# Patient Record
Sex: FEMALE | Race: WHITE | NOT HISPANIC OR LATINO | Employment: FULL TIME | URBAN - METROPOLITAN AREA
[De-identification: names, ages, dates, MRNs, and addresses within clinical notes are randomized per-mention and may not be internally consistent; named-entity substitution may affect disease eponyms.]

---

## 2021-03-30 ENCOUNTER — IMMUNIZATION (OUTPATIENT)
Dept: PRIMARY CARE CLINIC | Facility: CLINIC | Age: 26
End: 2021-03-30
Payer: COMMERCIAL

## 2021-03-30 DIAGNOSIS — Z23 NEED FOR VACCINATION: Primary | ICD-10-CM

## 2021-03-30 PROCEDURE — 0001A COVID-19, MRNA, LNP-S, PF, 30 MCG/0.3 ML DOSE VACCINE: ICD-10-PCS | Mod: CV19,S$GLB,, | Performed by: INTERNAL MEDICINE

## 2021-03-30 PROCEDURE — 91300 COVID-19, MRNA, LNP-S, PF, 30 MCG/0.3 ML DOSE VACCINE: CPT | Mod: S$GLB,,, | Performed by: INTERNAL MEDICINE

## 2021-03-30 PROCEDURE — 0001A COVID-19, MRNA, LNP-S, PF, 30 MCG/0.3 ML DOSE VACCINE: CPT | Mod: CV19,S$GLB,, | Performed by: INTERNAL MEDICINE

## 2021-03-30 PROCEDURE — 91300 COVID-19, MRNA, LNP-S, PF, 30 MCG/0.3 ML DOSE VACCINE: ICD-10-PCS | Mod: S$GLB,,, | Performed by: INTERNAL MEDICINE

## 2021-04-23 ENCOUNTER — IMMUNIZATION (OUTPATIENT)
Dept: PRIMARY CARE CLINIC | Facility: CLINIC | Age: 26
End: 2021-04-23
Payer: COMMERCIAL

## 2021-04-23 DIAGNOSIS — Z23 NEED FOR VACCINATION: Primary | ICD-10-CM

## 2021-04-23 PROCEDURE — 91300 COVID-19, MRNA, LNP-S, PF, 30 MCG/0.3 ML DOSE VACCINE: CPT | Mod: S$GLB,,, | Performed by: INTERNAL MEDICINE

## 2021-04-23 PROCEDURE — 0002A COVID-19, MRNA, LNP-S, PF, 30 MCG/0.3 ML DOSE VACCINE: ICD-10-PCS | Mod: CV19,S$GLB,, | Performed by: INTERNAL MEDICINE

## 2021-04-23 PROCEDURE — 0002A COVID-19, MRNA, LNP-S, PF, 30 MCG/0.3 ML DOSE VACCINE: CPT | Mod: CV19,S$GLB,, | Performed by: INTERNAL MEDICINE

## 2021-04-23 PROCEDURE — 91300 COVID-19, MRNA, LNP-S, PF, 30 MCG/0.3 ML DOSE VACCINE: ICD-10-PCS | Mod: S$GLB,,, | Performed by: INTERNAL MEDICINE

## 2021-06-13 PROBLEM — F41.9 ANXIETY: Status: ACTIVE | Noted: 2021-06-13

## 2021-06-14 ENCOUNTER — OFFICE VISIT (OUTPATIENT)
Dept: PRIMARY CARE CLINIC | Facility: CLINIC | Age: 26
End: 2021-06-14
Payer: COMMERCIAL

## 2021-06-14 VITALS
BODY MASS INDEX: 34.64 KG/M2 | RESPIRATION RATE: 18 BRPM | SYSTOLIC BLOOD PRESSURE: 115 MMHG | WEIGHT: 188.25 LBS | HEIGHT: 62 IN | TEMPERATURE: 98 F | DIASTOLIC BLOOD PRESSURE: 67 MMHG | OXYGEN SATURATION: 97 % | HEART RATE: 73 BPM

## 2021-06-14 DIAGNOSIS — Z00.00 NORMAL PHYSICAL EXAM, ROUTINE: Primary | ICD-10-CM

## 2021-06-14 DIAGNOSIS — Z13.220 SCREENING FOR LIPOID DISORDERS: ICD-10-CM

## 2021-06-14 DIAGNOSIS — F41.9 ANXIETY: ICD-10-CM

## 2021-06-14 PROBLEM — F90.0 ATTENTION DEFICIT HYPERACTIVITY DISORDER (ADHD), PREDOMINANTLY INATTENTIVE TYPE: Status: ACTIVE | Noted: 2021-06-14

## 2021-06-14 PROCEDURE — 99999 PR PBB SHADOW E&M-EST. PATIENT-LVL IV: CPT | Mod: PBBFAC,,, | Performed by: INTERNAL MEDICINE

## 2021-06-14 PROCEDURE — 3008F PR BODY MASS INDEX (BMI) DOCUMENTED: ICD-10-PCS | Mod: CPTII,S$GLB,, | Performed by: INTERNAL MEDICINE

## 2021-06-14 PROCEDURE — 3008F BODY MASS INDEX DOCD: CPT | Mod: CPTII,S$GLB,, | Performed by: INTERNAL MEDICINE

## 2021-06-14 PROCEDURE — 99385 PR PREVENTIVE VISIT,NEW,18-39: ICD-10-PCS | Mod: S$GLB,,, | Performed by: INTERNAL MEDICINE

## 2021-06-14 PROCEDURE — 99385 PREV VISIT NEW AGE 18-39: CPT | Mod: S$GLB,,, | Performed by: INTERNAL MEDICINE

## 2021-06-14 PROCEDURE — 99999 PR PBB SHADOW E&M-EST. PATIENT-LVL IV: ICD-10-PCS | Mod: PBBFAC,,, | Performed by: INTERNAL MEDICINE

## 2021-06-14 PROCEDURE — 1126F AMNT PAIN NOTED NONE PRSNT: CPT | Mod: S$GLB,,, | Performed by: INTERNAL MEDICINE

## 2021-06-14 PROCEDURE — 1126F PR PAIN SEVERITY QUANTIFIED, NO PAIN PRESENT: ICD-10-PCS | Mod: S$GLB,,, | Performed by: INTERNAL MEDICINE

## 2021-06-14 RX ORDER — PAROXETINE HYDROCHLORIDE 40 MG/1
TABLET, FILM COATED ORAL
Qty: 90 TABLET | Refills: 3 | Status: SHIPPED | OUTPATIENT
Start: 2021-06-14 | End: 2022-05-02

## 2021-06-14 RX ORDER — PAROXETINE HYDROCHLORIDE 40 MG/1
TABLET, FILM COATED ORAL
COMMUNITY
End: 2021-06-14 | Stop reason: SDUPTHER

## 2021-06-14 RX ORDER — PAROXETINE HYDROCHLORIDE 40 MG/1
TABLET, FILM COATED ORAL
Qty: 90 TABLET | Refills: 2 | Status: SHIPPED | OUTPATIENT
Start: 2021-06-14 | End: 2021-06-14

## 2021-10-05 ENCOUNTER — TELEPHONE (OUTPATIENT)
Dept: PRIMARY CARE CLINIC | Facility: CLINIC | Age: 26
End: 2021-10-05

## 2021-10-05 ENCOUNTER — PATIENT MESSAGE (OUTPATIENT)
Dept: ADMINISTRATIVE | Facility: HOSPITAL | Age: 26
End: 2021-10-05

## 2021-10-05 DIAGNOSIS — F41.9 ANXIETY: Primary | ICD-10-CM

## 2022-01-18 ENCOUNTER — PATIENT MESSAGE (OUTPATIENT)
Dept: ADMINISTRATIVE | Facility: HOSPITAL | Age: 27
End: 2022-01-18
Payer: COMMERCIAL

## 2022-02-07 ENCOUNTER — LAB VISIT (OUTPATIENT)
Dept: LAB | Facility: HOSPITAL | Age: 27
End: 2022-02-07
Attending: INTERNAL MEDICINE
Payer: COMMERCIAL

## 2022-02-07 ENCOUNTER — OFFICE VISIT (OUTPATIENT)
Dept: PRIMARY CARE CLINIC | Facility: CLINIC | Age: 27
End: 2022-02-07
Payer: COMMERCIAL

## 2022-02-07 VITALS
OXYGEN SATURATION: 98 % | TEMPERATURE: 98 F | WEIGHT: 186.06 LBS | HEIGHT: 62 IN | DIASTOLIC BLOOD PRESSURE: 84 MMHG | SYSTOLIC BLOOD PRESSURE: 132 MMHG | HEART RATE: 83 BPM | BODY MASS INDEX: 34.24 KG/M2

## 2022-02-07 DIAGNOSIS — R22.1: Primary | ICD-10-CM

## 2022-02-07 DIAGNOSIS — R22.1: ICD-10-CM

## 2022-02-07 PROCEDURE — 1160F RVW MEDS BY RX/DR IN RCRD: CPT | Mod: CPTII,S$GLB,, | Performed by: INTERNAL MEDICINE

## 2022-02-07 PROCEDURE — 99999 PR PBB SHADOW E&M-EST. PATIENT-LVL IV: CPT | Mod: PBBFAC,,, | Performed by: INTERNAL MEDICINE

## 2022-02-07 PROCEDURE — 3079F PR MOST RECENT DIASTOLIC BLOOD PRESSURE 80-89 MM HG: ICD-10-PCS | Mod: CPTII,S$GLB,, | Performed by: INTERNAL MEDICINE

## 2022-02-07 PROCEDURE — 99999 PR PBB SHADOW E&M-EST. PATIENT-LVL IV: ICD-10-PCS | Mod: PBBFAC,,, | Performed by: INTERNAL MEDICINE

## 2022-02-07 PROCEDURE — 1159F MED LIST DOCD IN RCRD: CPT | Mod: CPTII,S$GLB,, | Performed by: INTERNAL MEDICINE

## 2022-02-07 PROCEDURE — 1160F PR REVIEW ALL MEDS BY PRESCRIBER/CLIN PHARMACIST DOCUMENTED: ICD-10-PCS | Mod: CPTII,S$GLB,, | Performed by: INTERNAL MEDICINE

## 2022-02-07 PROCEDURE — 36415 COLL VENOUS BLD VENIPUNCTURE: CPT | Mod: PN | Performed by: INTERNAL MEDICINE

## 2022-02-07 PROCEDURE — 99213 PR OFFICE/OUTPT VISIT, EST, LEVL III, 20-29 MIN: ICD-10-PCS | Mod: S$GLB,,, | Performed by: INTERNAL MEDICINE

## 2022-02-07 PROCEDURE — 1159F PR MEDICATION LIST DOCUMENTED IN MEDICAL RECORD: ICD-10-PCS | Mod: CPTII,S$GLB,, | Performed by: INTERNAL MEDICINE

## 2022-02-07 PROCEDURE — 99213 OFFICE O/P EST LOW 20 MIN: CPT | Mod: S$GLB,,, | Performed by: INTERNAL MEDICINE

## 2022-02-07 PROCEDURE — 3079F DIAST BP 80-89 MM HG: CPT | Mod: CPTII,S$GLB,, | Performed by: INTERNAL MEDICINE

## 2022-02-07 PROCEDURE — 85025 COMPLETE CBC W/AUTO DIFF WBC: CPT | Performed by: INTERNAL MEDICINE

## 2022-02-07 PROCEDURE — 80048 BASIC METABOLIC PNL TOTAL CA: CPT | Performed by: INTERNAL MEDICINE

## 2022-02-07 PROCEDURE — 3008F PR BODY MASS INDEX (BMI) DOCUMENTED: ICD-10-PCS | Mod: CPTII,S$GLB,, | Performed by: INTERNAL MEDICINE

## 2022-02-07 PROCEDURE — 3075F SYST BP GE 130 - 139MM HG: CPT | Mod: CPTII,S$GLB,, | Performed by: INTERNAL MEDICINE

## 2022-02-07 PROCEDURE — 3008F BODY MASS INDEX DOCD: CPT | Mod: CPTII,S$GLB,, | Performed by: INTERNAL MEDICINE

## 2022-02-07 PROCEDURE — 3075F PR MOST RECENT SYSTOLIC BLOOD PRESS GE 130-139MM HG: ICD-10-PCS | Mod: CPTII,S$GLB,, | Performed by: INTERNAL MEDICINE

## 2022-02-07 NOTE — PROGRESS NOTES
"Subjective:       Patient ID: Yoana Thomas is a 26 y.o. female.    Chief Complaint: Lump (Left neck//ear region, doubled in size since [place])    Patient of Dr. Leyva presents for an urgent visit c/o lump in neck. She first noticed it months ago as a small nodular swelling at the angle of the mandible on left. History of keloid removed from left earlobe, with radiation treatment locally years ago. No recent trauma. The lump is growing, "doubled in size" over past six weeks. It is not painful, but is mildly tender on palpation. No associated upper respiratory symptoms or oral lesions, but she does feel her hearing is decreased in left ear. No ear pain, pruritis or discharge, no skin lesions in the area. Has not noticed any lumps or swollen glands elsewhere on her body.     PMH: Anxiety, ADD. Flu shot 2020. Pfizer COVID vaccine 3/21, 4/21, no booster.   PSH: [place] teeth, Keloid left earlobe.   NKDA.  Non-smoker, occasional alcohol. Works in [employer].    FMH: HTN, DM, Heart dis, Kidney dis, Skin cancer, Lung cancer.     Review of Systems   Constitutional: Negative for chills, diaphoresis, fatigue, fever and unexpected weight change.   HENT: Negative for nasal congestion, dental problem, ear discharge, ear pain, facial swelling, mouth sores, postnasal drip, rhinorrhea, sore throat, tinnitus, trouble swallowing and voice change.    Respiratory: Negative for cough, shortness of breath and stridor.    Gastrointestinal: Negative for abdominal pain, nausea and vomiting.   Neurological: Negative for dizziness and headaches.         Objective:    /84, Pulse 83, Temp 98.3, O2 Sat 98%, Wt 186 lbs (from 188 eight months ago)  Physical Exam  Constitutional:       General: She is not in acute distress.     Appearance: She is not ill-appearing.   HENT:      Head: Normocephalic and atraumatic.      Left Ear: Tympanic membrane, ear canal and external ear normal. There is no " impacted cerumen.      Nose: Nose normal. No congestion.      Mouth/Throat:      Pharynx: Oropharynx is clear.   Eyes:      Extraocular Movements: Extraocular movements intact.      Conjunctiva/sclera: Conjunctivae normal.   Neck:      Thyroid: No thyroid mass or thyromegaly.      Trachea: Trachea normal.        Comments: Visible and palpable single nodular density at angle of the mandible on left 1-1.5cm size, round, firm, mobile, non-tender, no abnormality of overlying skin. No adenopathy elsewhere in the neck.    Cardiovascular:      Rate and Rhythm: Normal rate and regular rhythm.      Heart sounds: No murmur heard.      Pulmonary:      Effort: Pulmonary effort is normal. No respiratory distress.      Breath sounds: Normal breath sounds. No wheezing, rhonchi or rales.   Musculoskeletal:      Cervical back: Normal range of motion and neck supple. No rigidity.   Skin:     General: Skin is warm and dry.      Findings: No bruising, erythema, lesion or rash.   Neurological:      Mental Status: She is alert.      Cranial Nerves: No cranial nerve deficit.         Assessment:       Problem List Items Addressed This Visit    None     Visit Diagnoses     Solitary mass of neck without fever    -  Primary    Relevant Orders    CBC Auto Differential    Basic Metabolic Panel    Ambulatory referral/consult to ENT          Plan:       Solitary mass of neck without fever - chronic, increasing size, in low risk, young non-smoker.  -     CBC Auto Differential; Future; Expected date: 02/07/2022  -     Basic Metabolic Panel; Future; Expected date: 02/07/2022  -     Ambulatory referral/consult to ENT; Future; Expected date: 02/14/2022                 -  Imaging deferred to ENT.

## 2022-02-08 LAB
ANION GAP SERPL CALC-SCNC: 11 MMOL/L (ref 8–16)
BASOPHILS # BLD AUTO: 0.03 K/UL (ref 0–0.2)
BASOPHILS NFR BLD: 0.3 % (ref 0–1.9)
BUN SERPL-MCNC: 11 MG/DL (ref 6–20)
CALCIUM SERPL-MCNC: 9.7 MG/DL (ref 8.7–10.5)
CHLORIDE SERPL-SCNC: 100 MMOL/L (ref 95–110)
CO2 SERPL-SCNC: 25 MMOL/L (ref 23–29)
CREAT SERPL-MCNC: 0.7 MG/DL (ref 0.5–1.4)
DIFFERENTIAL METHOD: ABNORMAL
EOSINOPHIL # BLD AUTO: 0.1 K/UL (ref 0–0.5)
EOSINOPHIL NFR BLD: 1 % (ref 0–8)
ERYTHROCYTE [DISTWIDTH] IN BLOOD BY AUTOMATED COUNT: 12.3 % (ref 11.5–14.5)
EST. GFR  (AFRICAN AMERICAN): >60 ML/MIN/1.73 M^2
EST. GFR  (NON AFRICAN AMERICAN): >60 ML/MIN/1.73 M^2
GLUCOSE SERPL-MCNC: 107 MG/DL (ref 70–110)
HCT VFR BLD AUTO: 43.6 % (ref 37–48.5)
HGB BLD-MCNC: 14.2 G/DL (ref 12–16)
IMM GRANULOCYTES # BLD AUTO: 0.05 K/UL (ref 0–0.04)
IMM GRANULOCYTES NFR BLD AUTO: 0.6 % (ref 0–0.5)
LYMPHOCYTES # BLD AUTO: 2.5 K/UL (ref 1–4.8)
LYMPHOCYTES NFR BLD: 28.6 % (ref 18–48)
MCH RBC QN AUTO: 29 PG (ref 27–31)
MCHC RBC AUTO-ENTMCNC: 32.6 G/DL (ref 32–36)
MCV RBC AUTO: 89 FL (ref 82–98)
MONOCYTES # BLD AUTO: 0.8 K/UL (ref 0.3–1)
MONOCYTES NFR BLD: 9.2 % (ref 4–15)
NEUTROPHILS # BLD AUTO: 5.3 K/UL (ref 1.8–7.7)
NEUTROPHILS NFR BLD: 60.3 % (ref 38–73)
NRBC BLD-RTO: 0 /100 WBC
PLATELET # BLD AUTO: 306 K/UL (ref 150–450)
PMV BLD AUTO: 10.5 FL (ref 9.2–12.9)
POTASSIUM SERPL-SCNC: 3.9 MMOL/L (ref 3.5–5.1)
RBC # BLD AUTO: 4.9 M/UL (ref 4–5.4)
SODIUM SERPL-SCNC: 136 MMOL/L (ref 136–145)
WBC # BLD AUTO: 8.87 K/UL (ref 3.9–12.7)

## 2022-02-16 ENCOUNTER — OFFICE VISIT (OUTPATIENT)
Dept: OTOLARYNGOLOGY | Facility: CLINIC | Age: 27
End: 2022-02-16
Payer: COMMERCIAL

## 2022-02-16 VITALS
HEART RATE: 116 BPM | WEIGHT: 185 LBS | DIASTOLIC BLOOD PRESSURE: 81 MMHG | BODY MASS INDEX: 33.84 KG/M2 | SYSTOLIC BLOOD PRESSURE: 123 MMHG

## 2022-02-16 DIAGNOSIS — K11.8 PAROTID MASS: Primary | ICD-10-CM

## 2022-02-16 PROCEDURE — 88313 SPECIAL STAINS GROUP 2: CPT | Mod: 26,,, | Performed by: PATHOLOGY

## 2022-02-16 PROCEDURE — 3074F PR MOST RECENT SYSTOLIC BLOOD PRESSURE < 130 MM HG: ICD-10-PCS | Mod: CPTII,S$GLB,, | Performed by: OTOLARYNGOLOGY

## 2022-02-16 PROCEDURE — 99204 OFFICE O/P NEW MOD 45 MIN: CPT | Mod: S$GLB,,, | Performed by: OTOLARYNGOLOGY

## 2022-02-16 PROCEDURE — 88313 SPECIAL STAINS GROUP 2: CPT | Performed by: PATHOLOGY

## 2022-02-16 PROCEDURE — 1159F MED LIST DOCD IN RCRD: CPT | Mod: CPTII,S$GLB,, | Performed by: OTOLARYNGOLOGY

## 2022-02-16 PROCEDURE — 88172 PR  EVALUATION OF FNA SMEAR TO DETERMINE ADEQUACY, FIRST EVAL: ICD-10-PCS | Mod: 26,,, | Performed by: PATHOLOGY

## 2022-02-16 PROCEDURE — 88271 CYTOGENETICS DNA PROBE: CPT | Mod: 59 | Performed by: PATHOLOGY

## 2022-02-16 PROCEDURE — 10021 FNA BX W/O IMG GDN 1ST LES: CPT | Mod: ,,, | Performed by: PATHOLOGY

## 2022-02-16 PROCEDURE — 88173 CYTOPATH EVAL FNA REPORT: CPT | Mod: 26,,, | Performed by: PATHOLOGY

## 2022-02-16 PROCEDURE — 88305 TISSUE EXAM BY PATHOLOGIST: CPT | Performed by: PATHOLOGY

## 2022-02-16 PROCEDURE — 88173 CYTOPATH EVAL FNA REPORT: CPT | Performed by: PATHOLOGY

## 2022-02-16 PROCEDURE — 3008F PR BODY MASS INDEX (BMI) DOCUMENTED: ICD-10-PCS | Mod: CPTII,S$GLB,, | Performed by: OTOLARYNGOLOGY

## 2022-02-16 PROCEDURE — 3008F BODY MASS INDEX DOCD: CPT | Mod: CPTII,S$GLB,, | Performed by: OTOLARYNGOLOGY

## 2022-02-16 PROCEDURE — 88305 TISSUE EXAM BY PATHOLOGIST: ICD-10-PCS | Mod: 26,,, | Performed by: PATHOLOGY

## 2022-02-16 PROCEDURE — 3079F PR MOST RECENT DIASTOLIC BLOOD PRESSURE 80-89 MM HG: ICD-10-PCS | Mod: CPTII,S$GLB,, | Performed by: OTOLARYNGOLOGY

## 2022-02-16 PROCEDURE — 88313 PR  SPECIAL STAINS,GROUP II: ICD-10-PCS | Mod: 26,,, | Performed by: PATHOLOGY

## 2022-02-16 PROCEDURE — 3079F DIAST BP 80-89 MM HG: CPT | Mod: CPTII,S$GLB,, | Performed by: OTOLARYNGOLOGY

## 2022-02-16 PROCEDURE — 88305 TISSUE EXAM BY PATHOLOGIST: CPT | Mod: 26,,, | Performed by: PATHOLOGY

## 2022-02-16 PROCEDURE — 88173 PR  INTERPRETATION OF FNA SMEAR: ICD-10-PCS | Mod: 26,,, | Performed by: PATHOLOGY

## 2022-02-16 PROCEDURE — 88172 CYTP DX EVAL FNA 1ST EA SITE: CPT | Mod: 26,,, | Performed by: PATHOLOGY

## 2022-02-16 PROCEDURE — 1159F PR MEDICATION LIST DOCUMENTED IN MEDICAL RECORD: ICD-10-PCS | Mod: CPTII,S$GLB,, | Performed by: OTOLARYNGOLOGY

## 2022-02-16 PROCEDURE — 99999 PR PBB SHADOW E&M-EST. PATIENT-LVL III: ICD-10-PCS | Mod: PBBFAC,,, | Performed by: OTOLARYNGOLOGY

## 2022-02-16 PROCEDURE — 99999 PR PBB SHADOW E&M-EST. PATIENT-LVL III: CPT | Mod: PBBFAC,,, | Performed by: OTOLARYNGOLOGY

## 2022-02-16 PROCEDURE — 3074F SYST BP LT 130 MM HG: CPT | Mod: CPTII,S$GLB,, | Performed by: OTOLARYNGOLOGY

## 2022-02-16 PROCEDURE — 10021 PR FINE NEEDLE ASP BIOPSY, W/O IMAGING GUIDANCE, 1ST LESION: ICD-10-PCS | Mod: ,,, | Performed by: PATHOLOGY

## 2022-02-16 PROCEDURE — 99204 PR OFFICE/OUTPT VISIT, NEW, LEVL IV, 45-59 MIN: ICD-10-PCS | Mod: S$GLB,,, | Performed by: OTOLARYNGOLOGY

## 2022-02-16 NOTE — PROCEDURES
Fine Needle Aspiration (FNA) Procedure Note    Yoana Thomas is a 26 y.o. female patient.    Pulse: (!) 116 (02/16/22 1552)  BP: 123/81 (02/16/22 1552)  Weight: 83.9 kg (185 lb) (02/16/22 1552)    The patient was examined and there was a palpable mass on the left parotid (tail).     The nature of the procedure, including indication, limitations, and risks were explained to the patient, and a consent form was signed. A timeout was performed at 1640.    The skin was prepared with alcohol, and a fine needle aspirate was performed. Four (4) passes were performed. Material was obtained, and the results will follow in a separate report. The patient tolerated the procedure well.    Additional comments: None  Complications: None    Yoana Thomas  02/16/2022      Procedures    2/16/2022

## 2022-02-23 NOTE — PROGRESS NOTES
Chief Complaint   Patient presents with    solitary mass         26 y.o. female presents with left infraauricular mass. She first noticed it months ago as a small nodular swelling. History significant for keloid removal on left earlobe treated with post-excision XRT in 2016. No recent trauma. The mass has increased in size over past six weeks. It is not painful, but is mildly tender on palpation. No ear pain, pruritis or discharge, no skin lesions in the area. Has not noticed any lumps or swollen glands elsewhere on her body.       Review of Systems     Constitutional: Negative for fatigue and unexpected weight change.   HENT: per HPI.  Eyes: Negative for visual disturbance.   Respiratory: Negative for shortness of breath, hemoptysis  Cardiovascular: Negative for chest pain and palpitations.   Genitourinary: Negative for dysuria and difficulty urinating.   Musculoskeletal: Negative for decreased ROM, back pain.   Skin: Negative for rash, sunburn, itching.   Neurological: Negative for dizziness and seizures.   Hematological: Negative for adenopathy. Does not bruise/bleed easily.   Psychiatric/Behavioral: Negative for agitation. The patient is not nervous/anxious.   Endocrine: Negative for rapid weight loss/weight gain, heat/cold intolerance.     Past Medical History:   Diagnosis Date    Anxiety     Attention deficit hyperactivity disorder (ADHD), predominantly inattentive type 6/14/2021       Past Surgical History:   Procedure Laterality Date    KELOID EXCISION      Left ear and she also had radiation Tx    WISDOM TOOTH EXTRACTION         family history includes Diabetes type I in her maternal grandmother; Diabetes type II in her maternal grandfather; Heart disease in her maternal grandmother; Hypertension in her father; Infertility in her sister; Kidney failure in her maternal grandmother; Lung cancer in her paternal grandfather; Skin cancer in her father.    Pt  reports that she has never smoked. She has never  used smokeless tobacco. She reports current alcohol use. She reports that she does not use drugs.    Review of patient's allergies indicates:  No Known Allergies     Physical Exam    Vitals:    02/16/22 1552   BP: 123/81   Pulse: (!) 116     Body mass index is 33.84 kg/m².    Physical Exam  Vitals and nursing note reviewed.   Constitutional:       General: She is not in acute distress.     Appearance: She is well-developed. She is not diaphoretic.   HENT:      Head: Normocephalic and atraumatic.        Right Ear: Hearing, tympanic membrane, ear canal and external ear normal. No decreased hearing noted. No middle ear effusion. Tympanic membrane has normal mobility.      Left Ear: Hearing, tympanic membrane, ear canal and external ear normal. No decreased hearing noted.  No middle ear effusion. Tympanic membrane has normal mobility.      Nose: Nose normal.   Eyes:      General: Lids are normal.         Right eye: No discharge.         Left eye: No discharge.      Conjunctiva/sclera: Conjunctivae normal.      Pupils: Pupils are equal, round, and reactive to light.   Neck:      Thyroid: No thyroid mass or thyromegaly.      Trachea: Trachea and phonation normal. No tracheal tenderness or tracheal deviation.   Cardiovascular:      Heart sounds: Normal heart sounds.   Pulmonary:      Breath sounds: Normal breath sounds. No stridor.   Abdominal:      Palpations: Abdomen is soft.   Musculoskeletal:      Cervical back: Normal range of motion and neck supple. No edema or erythema.   Lymphadenopathy:      Cervical: No cervical adenopathy.   Skin:     General: Skin is warm and dry.      Coloration: Skin is not pale.      Findings: No erythema or rash.   Neurological:      Mental Status: She is alert and oriented to person, place, and time.           Assessment     1. Parotid mass          Plan  In summary, Ms. Thomas is a 26 year old female with left tail of parotid mass, recently increased in size with history of adjacent  XRT. Discussed possible parotid neoplasm vs cyst vs lymph node. FNA done in clinic today. CT neck ordered for further evaluation. I will contact her when path/imaging complete with results and recommendations. All questions were answered and she is in agreement with the plan.

## 2022-03-04 ENCOUNTER — HOSPITAL ENCOUNTER (OUTPATIENT)
Dept: RADIOLOGY | Facility: HOSPITAL | Age: 27
Discharge: HOME | End: 2022-03-04
Attending: OTOLARYNGOLOGY
Payer: COMMERCIAL

## 2022-03-04 DIAGNOSIS — K11.8 PAROTID MASS: ICD-10-CM

## 2022-03-04 PROCEDURE — 25500020 PHARM REV CODE 255: Performed by: OTOLARYNGOLOGY

## 2022-03-04 PROCEDURE — 70491 CT SOFT TISSUE NECK WITH CONTRAST: ICD-10-PCS | Mod: 26,,, | Performed by: RADIOLOGY

## 2022-03-04 PROCEDURE — 70491 CT SOFT TISSUE NECK W/DYE: CPT | Mod: 26,,, | Performed by: RADIOLOGY

## 2022-03-04 PROCEDURE — 70491 CT SOFT TISSUE NECK W/DYE: CPT | Mod: TC

## 2022-03-04 RX ADMIN — IOHEXOL 75 ML: 350 INJECTION, SOLUTION INTRAVENOUS at 05:03

## 2022-03-05 ENCOUNTER — PATIENT MESSAGE (OUTPATIENT)
Dept: OTOLARYNGOLOGY | Facility: CLINIC | Age: 27
End: 2022-03-05
Payer: COMMERCIAL

## 2022-03-08 ENCOUNTER — OFFICE VISIT (OUTPATIENT)
Dept: OTOLARYNGOLOGY | Facility: CLINIC | Age: 27
End: 2022-03-08
Payer: COMMERCIAL

## 2022-03-08 VITALS
SYSTOLIC BLOOD PRESSURE: 124 MMHG | HEART RATE: 84 BPM | TEMPERATURE: 98 F | BODY MASS INDEX: 34.44 KG/M2 | WEIGHT: 188.25 LBS | DIASTOLIC BLOOD PRESSURE: 82 MMHG

## 2022-03-08 DIAGNOSIS — K11.8 PAROTID MASS: Primary | ICD-10-CM

## 2022-03-08 PROCEDURE — 3079F DIAST BP 80-89 MM HG: CPT | Mod: CPTII,S$GLB,, | Performed by: OTOLARYNGOLOGY

## 2022-03-08 PROCEDURE — 3008F PR BODY MASS INDEX (BMI) DOCUMENTED: ICD-10-PCS | Mod: CPTII,S$GLB,, | Performed by: OTOLARYNGOLOGY

## 2022-03-08 PROCEDURE — 99999 PR PBB SHADOW E&M-EST. PATIENT-LVL III: CPT | Mod: PBBFAC,,, | Performed by: OTOLARYNGOLOGY

## 2022-03-08 PROCEDURE — 3074F PR MOST RECENT SYSTOLIC BLOOD PRESSURE < 130 MM HG: ICD-10-PCS | Mod: CPTII,S$GLB,, | Performed by: OTOLARYNGOLOGY

## 2022-03-08 PROCEDURE — 99999 PR PBB SHADOW E&M-EST. PATIENT-LVL III: ICD-10-PCS | Mod: PBBFAC,,, | Performed by: OTOLARYNGOLOGY

## 2022-03-08 PROCEDURE — 99214 PR OFFICE/OUTPT VISIT, EST, LEVL IV, 30-39 MIN: ICD-10-PCS | Mod: S$GLB,,, | Performed by: OTOLARYNGOLOGY

## 2022-03-08 PROCEDURE — 1159F PR MEDICATION LIST DOCUMENTED IN MEDICAL RECORD: ICD-10-PCS | Mod: CPTII,S$GLB,, | Performed by: OTOLARYNGOLOGY

## 2022-03-08 PROCEDURE — 1159F MED LIST DOCD IN RCRD: CPT | Mod: CPTII,S$GLB,, | Performed by: OTOLARYNGOLOGY

## 2022-03-08 PROCEDURE — 99214 OFFICE O/P EST MOD 30 MIN: CPT | Mod: S$GLB,,, | Performed by: OTOLARYNGOLOGY

## 2022-03-08 PROCEDURE — 3008F BODY MASS INDEX DOCD: CPT | Mod: CPTII,S$GLB,, | Performed by: OTOLARYNGOLOGY

## 2022-03-08 PROCEDURE — 3074F SYST BP LT 130 MM HG: CPT | Mod: CPTII,S$GLB,, | Performed by: OTOLARYNGOLOGY

## 2022-03-08 PROCEDURE — 3079F PR MOST RECENT DIASTOLIC BLOOD PRESSURE 80-89 MM HG: ICD-10-PCS | Mod: CPTII,S$GLB,, | Performed by: OTOLARYNGOLOGY

## 2022-03-08 NOTE — PROGRESS NOTES
Chief Complaint   Patient presents with    discuss surgery         26 y.o. female presents with left infraauricular mass. She first noticed it months ago as a small nodular swelling. History significant for keloid removal on left earlobe treated with post-excision XRT in 2016. No recent trauma. The mass has increased in size over past six weeks. It is not painful, but is mildly tender on palpation. No ear pain, pruritis or discharge, no skin lesions in the area. Has not noticed any lumps or swollen glands elsewhere on her body.     Here for follow up after CT and FNA. No changes since last visit.      Review of Systems     Constitutional: Negative for fatigue and unexpected weight change.   HENT: per HPI.  Eyes: Negative for visual disturbance.   Respiratory: Negative for shortness of breath, hemoptysis  Cardiovascular: Negative for chest pain and palpitations.   Genitourinary: Negative for dysuria and difficulty urinating.   Musculoskeletal: Negative for decreased ROM, back pain.   Skin: Negative for rash, sunburn, itching.   Neurological: Negative for dizziness and seizures.   Hematological: Negative for adenopathy. Does not bruise/bleed easily.   Psychiatric/Behavioral: Negative for agitation. The patient is not nervous/anxious.   Endocrine: Negative for rapid weight loss/weight gain, heat/cold intolerance.     Past Medical History:   Diagnosis Date    Anxiety     Attention deficit hyperactivity disorder (ADHD), predominantly inattentive type 6/14/2021       Past Surgical History:   Procedure Laterality Date    KELOID EXCISION      Left ear and she also had radiation Tx    WISDOM TOOTH EXTRACTION         family history includes Diabetes type I in her maternal grandmother; Diabetes type II in her maternal grandfather; Heart disease in her maternal grandmother; Hypertension in her father; Infertility in her sister; Kidney failure in her maternal grandmother; Lung cancer in her paternal grandfather; Skin cancer  in her father.    Pt  reports that she has never smoked. She has never used smokeless tobacco. She reports current alcohol use. She reports that she does not use drugs.    Review of patient's allergies indicates:  No Known Allergies     Physical Exam    Vitals:    03/08/22 0922   BP: 124/82   Pulse: 84   Temp: 98.2 °F (36.8 °C)     Body mass index is 34.44 kg/m².    Physical Exam  Vitals and nursing note reviewed.   Constitutional:       General: She is not in acute distress.     Appearance: She is well-developed. She is not diaphoretic.   HENT:      Head: Normocephalic and atraumatic.        Right Ear: Hearing, tympanic membrane, ear canal and external ear normal. No decreased hearing noted. No middle ear effusion. Tympanic membrane has normal mobility.      Left Ear: Hearing, tympanic membrane, ear canal and external ear normal. No decreased hearing noted.  No middle ear effusion. Tympanic membrane has normal mobility.      Nose: Nose normal.   Eyes:      General: Lids are normal.         Right eye: No discharge.         Left eye: No discharge.      Conjunctiva/sclera: Conjunctivae normal.      Pupils: Pupils are equal, round, and reactive to light.   Neck:      Thyroid: No thyroid mass or thyromegaly.      Trachea: Trachea and phonation normal. No tracheal tenderness or tracheal deviation.   Cardiovascular:      Heart sounds: Normal heart sounds.   Pulmonary:      Breath sounds: Normal breath sounds. No stridor.   Abdominal:      Palpations: Abdomen is soft.   Musculoskeletal:      Cervical back: Normal range of motion and neck supple. No edema or erythema.   Lymphadenopathy:      Cervical: No cervical adenopathy.   Skin:     General: Skin is warm and dry.      Coloration: Skin is not pale.      Findings: No erythema or rash.   Neurological:      Mental Status: She is alert and oriented to person, place, and time.       Studies Reviewed:    CT Neck 3/4/22:  Impression:     Nonspecific irregular mixed cystic and  "solid  superficial left parotid mass.  The lesion enhances prominently and may be somewhat vascular in nature.  Adjacent prominent intraparotid lymph nodes suggested.  Borderline in size nonspecific left upper jugular chain lymph node with smaller clustered adjacent nodes.    Pathology:    SUBMITTED AS "LEFT TAIL OF PAROTID MASS", FINE NEEDLE ASPIRATION (PATHOLOGIST   PERFORMED):   - Atypical cells present:   - Rare groups of epithelial cells with focal intracellular mucin in a   background of mucinous cyst contents     Overall, the findings in this case are suspicious for a low-grade mucinous   cystic lesion, such as low-grade mucoepidermoid carcinoma or Warthin tumor   with mucinous metaplasia. The scant cellularity of the specimen makes it   difficult to entirely exclude a non-neoplastic process, such as acquired   mucinous cyst or sialadenitis/sialolithiasis with mucinous metaplasia of   ducts.    Assessment     1. Parotid mass          Plan  In summary, Ms. Thomas is a 26 year old female with left tail of parotid mass, recently increased in size with history of adjacent XRT. 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 Recommend parotidectomy for tumor removal and definitive diagnosis. We then discussed the risks, benefits, indications, and alternatives to left parotidectomy. The risks were noted to include, but not be limited to, infection, bleeding, scarring, partial or total weakness of one or all of the branches of the facial nerve, eye dryness and potential injury with nerve weakness, numbness of the ear and the side of the face, gustatory sweating (Anna's syndrome - which was described as sweating while eating due to cholinergic stimulation of sweat glands in the absence of salivary tissue), first bite syndrome (pain upon initial bite of food after dissection in the parapharyngeal space, tumor spillage, recurrence, collection of blood or tissue fluid, sialolcele formation requiring drainage, " and the need for additional procedures. The benefits in this case will be diagnostic and potentially therapeutic, and the indication is a parotid mass. The chief alternative, in the absence of a diagnosis, is observation or repeat needle biopsy. Time was allowed for questions, and all questions were answered to the patient's apparent satisfaction.  Informed consent was obtained. Plan for OR 3/21/22.

## 2022-03-14 ENCOUNTER — TELEPHONE (OUTPATIENT)
Dept: PREADMISSION TESTING | Facility: HOSPITAL | Age: 27
End: 2022-03-14
Payer: COMMERCIAL

## 2022-03-14 DIAGNOSIS — Z01.818 PRE-OP TESTING: Primary | ICD-10-CM

## 2022-03-14 NOTE — ANESTHESIA PAT ROS NOTE
"                                                                                                             03/14/2022  Yoana Thomas is a 26 y.o., female.      Pre-op Assessment          Review of Systems  Anesthesia Hx:  Denies Hx of Anesthetic complications  History of prior surgery of interest to airway management or planning: facial plastic/reconstructive. Previous anesthesia: General NASAL FRACTURE REPAIR "IN [employer]" with general anesthesia.  Denies Family Hx of Anesthesia complications.   Denies Personal Hx of Anesthesia complications.   Social:  No Alcohol Use, Non-Smoker    Hematology/Oncology:  Hematology Normal       -- Cancer in past history:  radiation    EENT/Dental:   PAROTID MASS.   Cardiovascular:    Denies Angina.  Functional Capacity good / => 4 METS    Pulmonary:   Denies Shortness of breath.  Denies Recent URI.    Renal/:  Renal/ Normal     Hepatic/GI:  Hepatic/GI Normal    Musculoskeletal:  Musculoskeletal Normal    Neurological:  Neurology Normal    Endocrine:  Obesity / BMI > 30  Psych:   Psychiatric History anxiety             Anesthesia Assessment: Preoperative EQUATION    Planned Procedure: Procedure(s) (LRB):  EXCISION, PAROTID GLAND-nerve monitoring (Left)  CREATION, FLAP, MUSCLE ROTATION-SCM (Left)  Requested Anesthesia Type:General  Surgeon: Edel Hall MD  Service: ENT  Known or anticipated Date of Surgery:3/21/2022    Surgeon notes: reviewed    Electronic QUestionnaire Assessment completed via nurse interview with patient.        Triage considerations:     The patient has no apparent active cardiac condition (No unstable coronary Syndrome such as severe unstable angina or recent [<1 month] myocardial infarction, decompensated CHF, severe valvular   disease or significant arrhythmia)    Previous anesthesia records:Not available    Last PCP note: within 3 months , within [facility]   Subspecialty notes: ENT    Other important co-morbidities: Obesity and HX " OF RADIATION ~2015      Tests already available:  Available tests,  within 3 months , within [facility] .     2/16/22  CT NECK    2/7/22  BMP  CBC              Instructions given. (See in Nurse's note)    Optimization:  Anesthesia Preop Clinic Assessment NOT Indicated    Medical Opinion NOT Indicated           Plan:    Testing:  EKG and T&S     Patient  has previously scheduled Medical Appointment: NOT AT THIS TIME    Navigation: Tests Scheduled.                           Results will be tracked by Preop Clinic.

## 2022-03-16 ENCOUNTER — HOSPITAL ENCOUNTER (OUTPATIENT)
Dept: CARDIOLOGY | Facility: CLINIC | Age: 27
Discharge: HOME | End: 2022-03-16
Payer: COMMERCIAL

## 2022-03-16 ENCOUNTER — LAB VISIT (OUTPATIENT)
Dept: LAB | Facility: HOSPITAL | Age: 27
End: 2022-03-16
Attending: ANESTHESIOLOGY
Payer: COMMERCIAL

## 2022-03-16 DIAGNOSIS — Z01.818 PRE-OP TESTING: ICD-10-CM

## 2022-03-16 LAB
ABO + RH BLD: NORMAL
ADEQUACY: ABNORMAL
BLD GP AB SCN CELLS X3 SERPL QL: NORMAL
COMMENT: ABNORMAL
FINAL PATHOLOGIC DIAGNOSIS: ABNORMAL
Lab: ABNORMAL
SUPPLEMENTAL DIAGNOSIS: ABNORMAL

## 2022-03-16 PROCEDURE — 36415 COLL VENOUS BLD VENIPUNCTURE: CPT | Performed by: ANESTHESIOLOGY

## 2022-03-16 PROCEDURE — 93005 ELECTROCARDIOGRAM TRACING: CPT | Mod: S$GLB,,, | Performed by: ANESTHESIOLOGY

## 2022-03-16 PROCEDURE — 93005 EKG 12-LEAD: ICD-10-PCS | Mod: S$GLB,,, | Performed by: ANESTHESIOLOGY

## 2022-03-16 PROCEDURE — 86901 BLOOD TYPING SEROLOGIC RH(D): CPT | Performed by: ANESTHESIOLOGY

## 2022-03-16 PROCEDURE — 93010 ELECTROCARDIOGRAM REPORT: CPT | Mod: S$GLB,,, | Performed by: INTERNAL MEDICINE

## 2022-03-16 PROCEDURE — 93010 EKG 12-LEAD: ICD-10-PCS | Mod: S$GLB,,, | Performed by: INTERNAL MEDICINE

## 2022-03-21 ENCOUNTER — HOSPITAL ENCOUNTER (OUTPATIENT)
Facility: HOSPITAL | Age: 27
Discharge: HOME | End: 2022-03-22
Attending: OTOLARYNGOLOGY | Admitting: OTOLARYNGOLOGY
Payer: COMMERCIAL

## 2022-03-21 ENCOUNTER — ANESTHESIA (OUTPATIENT)
Dept: SURGERY | Facility: HOSPITAL | Age: 27
End: 2022-03-21
Payer: COMMERCIAL

## 2022-03-21 ENCOUNTER — ANESTHESIA EVENT (OUTPATIENT)
Dept: SURGERY | Facility: HOSPITAL | Age: 27
End: 2022-03-21
Payer: COMMERCIAL

## 2022-03-21 DIAGNOSIS — D49.0 PAROTID NEOPLASM: Primary | ICD-10-CM

## 2022-03-21 LAB
B-HCG UR QL: NEGATIVE
CTP QC/QA: YES

## 2022-03-21 PROCEDURE — 15733 PR MUSCLE/MYOCUTANEOUS/FASIOTEANEOUS FLAP, HEAD/NECK: ICD-10-PCS | Mod: 51,82,, | Performed by: OTOLARYNGOLOGY

## 2022-03-21 PROCEDURE — D9220A PRA ANESTHESIA: ICD-10-PCS | Mod: ANES,,, | Performed by: ANESTHESIOLOGY

## 2022-03-21 PROCEDURE — 25000003 PHARM REV CODE 250: Performed by: NURSE ANESTHETIST, CERTIFIED REGISTERED

## 2022-03-21 PROCEDURE — D9220A PRA ANESTHESIA: Mod: ANES,,, | Performed by: ANESTHESIOLOGY

## 2022-03-21 PROCEDURE — 71000016 HC POSTOP RECOV ADDL HR: Performed by: OTOLARYNGOLOGY

## 2022-03-21 PROCEDURE — 27201423 OPTIME MED/SURG SUP & DEVICES STERILE SUPPLY: Performed by: OTOLARYNGOLOGY

## 2022-03-21 PROCEDURE — 36000708 HC OR TIME LEV III 1ST 15 MIN: Performed by: OTOLARYNGOLOGY

## 2022-03-21 PROCEDURE — 88341 IMHCHEM/IMCYTCHM EA ADD ANTB: CPT | Mod: 59 | Performed by: STUDENT IN AN ORGANIZED HEALTH CARE EDUCATION/TRAINING PROGRAM

## 2022-03-21 PROCEDURE — 88342 IMHCHEM/IMCYTCHM 1ST ANTB: CPT | Mod: 26,,, | Performed by: STUDENT IN AN ORGANIZED HEALTH CARE EDUCATION/TRAINING PROGRAM

## 2022-03-21 PROCEDURE — 71000033 HC RECOVERY, INTIAL HOUR: Performed by: OTOLARYNGOLOGY

## 2022-03-21 PROCEDURE — 63600175 PHARM REV CODE 636 W HCPCS: Performed by: OTOLARYNGOLOGY

## 2022-03-21 PROCEDURE — 63600175 PHARM REV CODE 636 W HCPCS: Performed by: STUDENT IN AN ORGANIZED HEALTH CARE EDUCATION/TRAINING PROGRAM

## 2022-03-21 PROCEDURE — 88307 TISSUE EXAM BY PATHOLOGIST: CPT | Performed by: STUDENT IN AN ORGANIZED HEALTH CARE EDUCATION/TRAINING PROGRAM

## 2022-03-21 PROCEDURE — 42415 EXCISE PAROTID GLAND/LESION: CPT | Mod: LT,,, | Performed by: OTOLARYNGOLOGY

## 2022-03-21 PROCEDURE — 71000015 HC POSTOP RECOV 1ST HR: Performed by: OTOLARYNGOLOGY

## 2022-03-21 PROCEDURE — 42415 PR EXC PAROTD,LAT LOBE,DISSECT 5TH NERV: ICD-10-PCS | Mod: 82,LT,, | Performed by: OTOLARYNGOLOGY

## 2022-03-21 PROCEDURE — 27000221 HC OXYGEN, UP TO 24 HOURS

## 2022-03-21 PROCEDURE — D9220A PRA ANESTHESIA: Mod: CRNA,,, | Performed by: STUDENT IN AN ORGANIZED HEALTH CARE EDUCATION/TRAINING PROGRAM

## 2022-03-21 PROCEDURE — 88307 PR  SURG PATH,LEVEL V: ICD-10-PCS | Mod: 26,,, | Performed by: STUDENT IN AN ORGANIZED HEALTH CARE EDUCATION/TRAINING PROGRAM

## 2022-03-21 PROCEDURE — 88307 TISSUE EXAM BY PATHOLOGIST: CPT | Mod: 26,,, | Performed by: STUDENT IN AN ORGANIZED HEALTH CARE EDUCATION/TRAINING PROGRAM

## 2022-03-21 PROCEDURE — 36000709 HC OR TIME LEV III EA ADD 15 MIN: Performed by: OTOLARYNGOLOGY

## 2022-03-21 PROCEDURE — 88341 PR IHC OR ICC EACH ADD'L SINGLE ANTIBODY  STAINPR: ICD-10-PCS | Mod: 26,,, | Performed by: STUDENT IN AN ORGANIZED HEALTH CARE EDUCATION/TRAINING PROGRAM

## 2022-03-21 PROCEDURE — 15733 MUSC MYOQ/FSCQ FLP H&N PEDCL: CPT | Mod: 51,82,, | Performed by: OTOLARYNGOLOGY

## 2022-03-21 PROCEDURE — 25000003 PHARM REV CODE 250: Performed by: OTOLARYNGOLOGY

## 2022-03-21 PROCEDURE — 25000003 PHARM REV CODE 250: Performed by: STUDENT IN AN ORGANIZED HEALTH CARE EDUCATION/TRAINING PROGRAM

## 2022-03-21 PROCEDURE — 15733 MUSC MYOQ/FSCQ FLP H&N PEDCL: CPT | Mod: 51,,, | Performed by: OTOLARYNGOLOGY

## 2022-03-21 PROCEDURE — 15733 PR MUSCLE/MYOCUTANEOUS/FASIOTEANEOUS FLAP, HEAD/NECK: ICD-10-PCS | Mod: 51,,, | Performed by: OTOLARYNGOLOGY

## 2022-03-21 PROCEDURE — 81025 URINE PREGNANCY TEST: CPT | Performed by: OTOLARYNGOLOGY

## 2022-03-21 PROCEDURE — D9220A PRA ANESTHESIA: ICD-10-PCS | Mod: CRNA,,, | Performed by: STUDENT IN AN ORGANIZED HEALTH CARE EDUCATION/TRAINING PROGRAM

## 2022-03-21 PROCEDURE — 88342 CHG IMMUNOCYTOCHEMISTRY: ICD-10-PCS | Mod: 26,,, | Performed by: STUDENT IN AN ORGANIZED HEALTH CARE EDUCATION/TRAINING PROGRAM

## 2022-03-21 PROCEDURE — 42415 EXCISE PAROTID GLAND/LESION: CPT | Mod: 82,LT,, | Performed by: OTOLARYNGOLOGY

## 2022-03-21 PROCEDURE — 88342 IMHCHEM/IMCYTCHM 1ST ANTB: CPT | Performed by: STUDENT IN AN ORGANIZED HEALTH CARE EDUCATION/TRAINING PROGRAM

## 2022-03-21 PROCEDURE — 88341 IMHCHEM/IMCYTCHM EA ADD ANTB: CPT | Mod: 26,,, | Performed by: STUDENT IN AN ORGANIZED HEALTH CARE EDUCATION/TRAINING PROGRAM

## 2022-03-21 PROCEDURE — 37000009 HC ANESTHESIA EA ADD 15 MINS: Performed by: OTOLARYNGOLOGY

## 2022-03-21 PROCEDURE — C1729 CATH, DRAINAGE: HCPCS | Performed by: OTOLARYNGOLOGY

## 2022-03-21 PROCEDURE — 37000008 HC ANESTHESIA 1ST 15 MINUTES: Performed by: OTOLARYNGOLOGY

## 2022-03-21 PROCEDURE — 42415 PR EXC PAROTD,LAT LOBE,DISSECT 5TH NERV: ICD-10-PCS | Mod: LT,,, | Performed by: OTOLARYNGOLOGY

## 2022-03-21 PROCEDURE — 94761 N-INVAS EAR/PLS OXIMETRY MLT: CPT

## 2022-03-21 RX ORDER — HYDROMORPHONE HYDROCHLORIDE 1 MG/ML
0.2 INJECTION, SOLUTION INTRAMUSCULAR; INTRAVENOUS; SUBCUTANEOUS EVERY 5 MIN PRN
Status: DISCONTINUED | OUTPATIENT
Start: 2022-03-21 | End: 2022-03-21 | Stop reason: HOSPADM

## 2022-03-21 RX ORDER — TALC
6 POWDER (GRAM) TOPICAL NIGHTLY PRN
Status: DISCONTINUED | OUTPATIENT
Start: 2022-03-21 | End: 2022-03-22 | Stop reason: HOSPADM

## 2022-03-21 RX ORDER — HALOPERIDOL 5 MG/ML
INJECTION INTRAMUSCULAR
Status: DISCONTINUED | OUTPATIENT
Start: 2022-03-21 | End: 2022-03-21

## 2022-03-21 RX ORDER — SODIUM CHLORIDE 0.9 % (FLUSH) 0.9 %
10 SYRINGE (ML) INJECTION
Status: DISCONTINUED | OUTPATIENT
Start: 2022-03-21 | End: 2022-03-21 | Stop reason: HOSPADM

## 2022-03-21 RX ORDER — TRIAMCINOLONE ACETONIDE 40 MG/ML
INJECTION, SUSPENSION INTRA-ARTICULAR; INTRAMUSCULAR
Status: DISCONTINUED | OUTPATIENT
Start: 2022-03-21 | End: 2022-03-21 | Stop reason: HOSPADM

## 2022-03-21 RX ORDER — ONDANSETRON 2 MG/ML
INJECTION INTRAMUSCULAR; INTRAVENOUS
Status: DISCONTINUED | OUTPATIENT
Start: 2022-03-21 | End: 2022-03-21

## 2022-03-21 RX ORDER — CEPHALEXIN 500 MG/1
500 CAPSULE ORAL EVERY 6 HOURS
Status: DISCONTINUED | OUTPATIENT
Start: 2022-03-21 | End: 2022-03-22 | Stop reason: HOSPADM

## 2022-03-21 RX ORDER — SODIUM CHLORIDE 0.9 % (FLUSH) 0.9 %
10 SYRINGE (ML) INJECTION
Status: DISCONTINUED | OUTPATIENT
Start: 2022-03-21 | End: 2022-03-22 | Stop reason: HOSPADM

## 2022-03-21 RX ORDER — HYDROCODONE BITARTRATE AND ACETAMINOPHEN 5; 325 MG/1; MG/1
1 TABLET ORAL EVERY 4 HOURS PRN
Status: DISCONTINUED | OUTPATIENT
Start: 2022-03-21 | End: 2022-03-22 | Stop reason: HOSPADM

## 2022-03-21 RX ORDER — PROPOFOL 10 MG/ML
VIAL (ML) INTRAVENOUS
Status: DISCONTINUED | OUTPATIENT
Start: 2022-03-21 | End: 2022-03-21

## 2022-03-21 RX ORDER — FENTANYL CITRATE 50 UG/ML
INJECTION, SOLUTION INTRAMUSCULAR; INTRAVENOUS
Status: DISCONTINUED | OUTPATIENT
Start: 2022-03-21 | End: 2022-03-21

## 2022-03-21 RX ORDER — MIDAZOLAM HYDROCHLORIDE 1 MG/ML
INJECTION INTRAMUSCULAR; INTRAVENOUS
Status: DISCONTINUED | OUTPATIENT
Start: 2022-03-21 | End: 2022-03-21

## 2022-03-21 RX ORDER — ACETAMINOPHEN 325 MG/1
650 TABLET ORAL EVERY 4 HOURS PRN
Status: DISCONTINUED | OUTPATIENT
Start: 2022-03-21 | End: 2022-03-22 | Stop reason: HOSPADM

## 2022-03-21 RX ORDER — HALOPERIDOL 5 MG/ML
0.5 INJECTION INTRAMUSCULAR EVERY 10 MIN PRN
Status: DISCONTINUED | OUTPATIENT
Start: 2022-03-21 | End: 2022-03-21 | Stop reason: HOSPADM

## 2022-03-21 RX ORDER — SODIUM CHLORIDE 9 MG/ML
INJECTION, SOLUTION INTRAVENOUS CONTINUOUS PRN
Status: DISCONTINUED | OUTPATIENT
Start: 2022-03-21 | End: 2022-03-21

## 2022-03-21 RX ORDER — LIDOCAINE HYDROCHLORIDE 10 MG/ML
1 INJECTION, SOLUTION EPIDURAL; INFILTRATION; INTRACAUDAL; PERINEURAL ONCE
Status: DISCONTINUED | OUTPATIENT
Start: 2022-03-21 | End: 2022-03-22 | Stop reason: HOSPADM

## 2022-03-21 RX ORDER — PAROXETINE 10 MG/1
40 TABLET, FILM COATED ORAL DAILY
Status: DISCONTINUED | OUTPATIENT
Start: 2022-03-21 | End: 2022-03-22 | Stop reason: HOSPADM

## 2022-03-21 RX ORDER — DEXMEDETOMIDINE HYDROCHLORIDE 100 UG/ML
INJECTION, SOLUTION INTRAVENOUS
Status: DISCONTINUED | OUTPATIENT
Start: 2022-03-21 | End: 2022-03-21

## 2022-03-21 RX ORDER — ONDANSETRON 2 MG/ML
4 INJECTION INTRAMUSCULAR; INTRAVENOUS EVERY 6 HOURS PRN
Status: DISCONTINUED | OUTPATIENT
Start: 2022-03-21 | End: 2022-03-22 | Stop reason: HOSPADM

## 2022-03-21 RX ORDER — MORPHINE SULFATE 2 MG/ML
2 INJECTION, SOLUTION INTRAMUSCULAR; INTRAVENOUS EVERY 6 HOURS PRN
Status: DISCONTINUED | OUTPATIENT
Start: 2022-03-21 | End: 2022-03-22 | Stop reason: HOSPADM

## 2022-03-21 RX ORDER — SUCCINYLCHOLINE CHLORIDE 20 MG/ML
INJECTION INTRAMUSCULAR; INTRAVENOUS
Status: DISCONTINUED | OUTPATIENT
Start: 2022-03-21 | End: 2022-03-21

## 2022-03-21 RX ORDER — HYDROCODONE BITARTRATE AND ACETAMINOPHEN 10; 325 MG/1; MG/1
1 TABLET ORAL EVERY 4 HOURS PRN
Status: DISCONTINUED | OUTPATIENT
Start: 2022-03-21 | End: 2022-03-22 | Stop reason: HOSPADM

## 2022-03-21 RX ORDER — ROCURONIUM BROMIDE 10 MG/ML
INJECTION, SOLUTION INTRAVENOUS
Status: DISCONTINUED | OUTPATIENT
Start: 2022-03-21 | End: 2022-03-21

## 2022-03-21 RX ORDER — SODIUM CHLORIDE 9 MG/ML
INJECTION, SOLUTION INTRAVENOUS CONTINUOUS
Status: DISCONTINUED | OUTPATIENT
Start: 2022-03-21 | End: 2022-03-21

## 2022-03-21 RX ORDER — LIDOCAINE HYDROCHLORIDE AND EPINEPHRINE 10; 10 MG/ML; UG/ML
INJECTION, SOLUTION INFILTRATION; PERINEURAL
Status: DISCONTINUED | OUTPATIENT
Start: 2022-03-21 | End: 2022-03-21

## 2022-03-21 RX ORDER — PHENYLEPHRINE HYDROCHLORIDE 10 MG/ML
INJECTION INTRAVENOUS
Status: DISCONTINUED | OUTPATIENT
Start: 2022-03-21 | End: 2022-03-21

## 2022-03-21 RX ORDER — ACETAMINOPHEN 10 MG/ML
INJECTION, SOLUTION INTRAVENOUS
Status: DISCONTINUED | OUTPATIENT
Start: 2022-03-21 | End: 2022-03-21

## 2022-03-21 RX ORDER — LIDOCAINE HCL/PF 100 MG/5ML
SYRINGE (ML) INTRAVENOUS
Status: DISCONTINUED | OUTPATIENT
Start: 2022-03-21 | End: 2022-03-21

## 2022-03-21 RX ORDER — DEXAMETHASONE SODIUM PHOSPHATE 4 MG/ML
INJECTION, SOLUTION INTRA-ARTICULAR; INTRALESIONAL; INTRAMUSCULAR; INTRAVENOUS; SOFT TISSUE
Status: DISCONTINUED | OUTPATIENT
Start: 2022-03-21 | End: 2022-03-21

## 2022-03-21 RX ADMIN — LIDOCAINE HYDROCHLORIDE 60 MG: 20 INJECTION, SOLUTION INTRAVENOUS at 10:03

## 2022-03-21 RX ADMIN — FENTANYL CITRATE 50 MCG: 50 INJECTION, SOLUTION INTRAMUSCULAR; INTRAVENOUS at 11:03

## 2022-03-21 RX ADMIN — PHENYLEPHRINE HYDROCHLORIDE 100 MCG: 10 INJECTION INTRAVENOUS at 12:03

## 2022-03-21 RX ADMIN — Medication 2 G: at 10:03

## 2022-03-21 RX ADMIN — PHENYLEPHRINE HYDROCHLORIDE 100 MCG: 10 INJECTION INTRAVENOUS at 10:03

## 2022-03-21 RX ADMIN — HALOPERIDOL 1 MG: 5 INJECTION INTRAMUSCULAR at 12:03

## 2022-03-21 RX ADMIN — Medication 2 G: at 02:03

## 2022-03-21 RX ADMIN — ROCURONIUM BROMIDE 5 MG: 10 INJECTION, SOLUTION INTRAVENOUS at 10:03

## 2022-03-21 RX ADMIN — SODIUM CHLORIDE: 0.9 INJECTION, SOLUTION INTRAVENOUS at 10:03

## 2022-03-21 RX ADMIN — MIDAZOLAM HYDROCHLORIDE 2 MG: 1 INJECTION, SOLUTION INTRAMUSCULAR; INTRAVENOUS at 10:03

## 2022-03-21 RX ADMIN — CEPHALEXIN 500 MG: 500 CAPSULE ORAL at 11:03

## 2022-03-21 RX ADMIN — PROPOFOL 180 MG: 10 INJECTION, EMULSION INTRAVENOUS at 10:03

## 2022-03-21 RX ADMIN — FENTANYL CITRATE 100 MCG: 50 INJECTION, SOLUTION INTRAMUSCULAR; INTRAVENOUS at 10:03

## 2022-03-21 RX ADMIN — ACETAMINOPHEN 1000 MG: 10 INJECTION, SOLUTION INTRAVENOUS at 10:03

## 2022-03-21 RX ADMIN — ONDANSETRON HYDROCHLORIDE 4 MG: 2 INJECTION INTRAMUSCULAR; INTRAVENOUS at 02:03

## 2022-03-21 RX ADMIN — DEXMEDETOMIDINE HYDROCHLORIDE 8 MCG: 100 INJECTION, SOLUTION INTRAVENOUS at 03:03

## 2022-03-21 RX ADMIN — PHENYLEPHRINE HYDROCHLORIDE 100 MCG: 10 INJECTION INTRAVENOUS at 11:03

## 2022-03-21 RX ADMIN — GLYCOPYRROLATE 0.1 MG: 0.2 INJECTION, SOLUTION INTRAMUSCULAR; INTRAVITREAL at 11:03

## 2022-03-21 RX ADMIN — FENTANYL CITRATE 50 MCG: 50 INJECTION, SOLUTION INTRAMUSCULAR; INTRAVENOUS at 12:03

## 2022-03-21 RX ADMIN — CEPHALEXIN 500 MG: 500 CAPSULE ORAL at 06:03

## 2022-03-21 RX ADMIN — SODIUM CHLORIDE, SODIUM GLUCONATE, SODIUM ACETATE, POTASSIUM CHLORIDE, MAGNESIUM CHLORIDE, SODIUM PHOSPHATE, DIBASIC, AND POTASSIUM PHOSPHATE: .53; .5; .37; .037; .03; .012; .00082 INJECTION, SOLUTION INTRAVENOUS at 11:03

## 2022-03-21 RX ADMIN — DEXMEDETOMIDINE HYDROCHLORIDE 8 MCG: 100 INJECTION, SOLUTION INTRAVENOUS at 02:03

## 2022-03-21 RX ADMIN — DEXAMETHASONE SODIUM PHOSPHATE 8 MG: 4 INJECTION, SOLUTION INTRAMUSCULAR; INTRAVENOUS at 10:03

## 2022-03-21 RX ADMIN — SUCCINYLCHOLINE CHLORIDE 140 MG: 20 INJECTION, SOLUTION INTRAMUSCULAR; INTRAVENOUS at 10:03

## 2022-03-21 RX ADMIN — SODIUM CHLORIDE 0.3 MCG/KG/MIN: 9 INJECTION, SOLUTION INTRAVENOUS at 12:03

## 2022-03-21 NOTE — PROGRESS NOTES
MD at bedside evaluating pt post left parotid gland excision. States she is aware of left facial weakness when she smiles. No orders at this time. WCTM.

## 2022-03-21 NOTE — ANESTHESIA POSTPROCEDURE EVALUATION
Anesthesia Post Evaluation    Patient: Yoana Thomas    Procedure(s) Performed: Procedure(s) (LRB):  EXCISION, PAROTID GLAND-nerve monitoring (Left)  CREATION, FLAP, MUSCLE ROTATION-SCM (Left)    Final Anesthesia Type: general      Patient location during evaluation: PACU  Patient participation: Yes- Able to Participate  Level of consciousness: awake and alert  Post-procedure vital signs: reviewed and stable  Pain management: adequate  Airway patency: patent    PONV status at discharge: No PONV  Anesthetic complications: no      Cardiovascular status: blood pressure returned to baseline  Respiratory status: unassisted  Follow-up not needed.          Vitals Value Taken Time   BP 96/55 03/21/22 1603   Temp 37.1 °C (98.8 °F) 03/21/22 1530   Pulse 105 03/21/22 1604   Resp 20 03/21/22 1604   SpO2 96 % 03/21/22 1604   Vitals shown include unvalidated device data.      No case tracking events are documented in the log.      Pain/Jassi Score: Jassi Score: 9 (3/21/2022  3:45 PM)

## 2022-03-21 NOTE — TRANSFER OF CARE
"Anesthesia Transfer of Care Note    Patient: Yoana Thomas    Procedure(s) Performed: Procedure(s) (LRB):  EXCISION, PAROTID GLAND-nerve monitoring (Left)  CREATION, FLAP, MUSCLE ROTATION-SCM (Left)    Patient location: PACU    Anesthesia Type: general    Transport from OR: Transported from OR on 6-10 L/min O2 by face mask with adequate spontaneous ventilation    Post pain: adequate analgesia    Post assessment: no apparent anesthetic complications    Post vital signs: stable    Level of consciousness: awake    Nausea/Vomiting: no nausea/vomiting    Complications: none    Transfer of care protocol was followed      Last vitals:   Visit Vitals  BP (!) 106/57 (BP Location: Left arm, Patient Position: Lying)   Pulse 102   Temp 37.1 °C (98.8 °F) (Temporal)   Resp 18   Ht 5' 2" (1.575 m)   Wt 83.9 kg (185 lb)   LMP 02/21/2022   SpO2 99%   Breastfeeding No   BMI 33.84 kg/m²     "

## 2022-03-21 NOTE — NURSING TRANSFER
Nursing Transfer Note      3/21/2022     Reason patient is being transferred: in patient care    Transfer From: PACU    Transfer via stretcher    Transfer with n/a    Transported by transportation    Medicines sent: n/a    Any special needs or follow-up needed: none    Chart send with patient: Yes    Notified: mother    Patient reassessed at: 03/21/22, 1817    Upon arrival to floor: patient oriented to room, call bell in reach and bed in lowest position, medications given, vitals assessed, pain denied.

## 2022-03-21 NOTE — BRIEF OP NOTE
[facility] - Surgery ([facility])  Brief Operative Note    SUMMARY     Surgery Date: 3/21/2022     Surgeon(s) and Role:     * Edel Hall MD - Primary     * Tin Barriga MD - Assisting     * Gayathri Rodriguez MD - Resident - Assisting        Pre-op Diagnosis:  Parotid mass [K11.8]    Post-op Diagnosis:  Post-Op Diagnosis Codes:     * Parotid mass [K11.8]    Procedure(s) (LRB):  EXCISION, PAROTID GLAND-nerve monitoring (Left)  CREATION, FLAP, MUSCLE ROTATION-SCM (Left)    Anesthesia: General    Operative Findings: See op note     Estimated Blood Loss: 10 cc    Estimated Blood Loss has not been documented. EBL = 10 cc.         Specimens:   Specimen (24h ago, onward)             Start     Ordered    03/21/22 1407  Specimen to Pathology, Surgery ENT  Once        Comments: Pre-op Diagnosis: Parotid mass [K11.8]    Procedure(s):  EXCISION, PAROTID GLAND-nerve monitoring  CREATION, FLAP, MUSCLE ROTATION-SCM     Number of specimens: 1    Name of specimens:     1. Left parotid mass -permanent   References:    Click here for ordering Quick Tip   Question Answer Comment   Procedure Type: ENT    Specimen Class: Complex case/Special        03/21/22 141                [id number]

## 2022-03-21 NOTE — ANESTHESIA PROCEDURE NOTES
Intubation    Date/Time: 3/21/2022 10:20 AM  Performed by: Arleen Dickey CRNA  Authorized by: Alec Ibanez MD     Intubation:     Induction:  Intravenous    Intubated:  Postinduction    Mask Ventilation:  Easy mask    Attempts:  1    Attempted By:  CRNA    Method of Intubation:  Direct    Blade:  Gillette 2    Laryngeal View Grade: Grade I - full view of cords      Difficult Airway Encountered?: No      Complications:  None    Airway Device:  Oral endotracheal tube    Airway Device Size:  7.0    Style/Cuff Inflation:  Cuffed (inflated to minimal occlusive pressure)    Tube secured:  21    Secured at:  The lips    Placement Verified By:  Capnometry    Complicating Factors:  None    Findings Post-Intubation:  BS equal bilateral and atraumatic/condition of teeth unchanged

## 2022-03-21 NOTE — OP NOTE
Surgery Date: 3/21/2022      Surgeon(s) and Role:     * Edel Hall MD - Primary     * Tin Barriga MD - Assisting     * Gayathri Rodriguez MD - Resident - Assisting           Pre-op Diagnosis:  Parotid mass [K11.8]     Post-op Diagnosis:  Post-Op Diagnosis Codes:     * Parotid mass [K11.8]     Procedure(s) (LRB):  EXCISION, PAROTID GLAND-nerve monitoring (Left)  CREATION, FLAP, MUSCLE ROTATION-SCM (Left)     Anesthesia: General     Operative Findings: Left parotid mass, left facial nerve intact and stimulated post-resection     Indication for surgery: Ms. 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 Previous history of radiation for left earlobe keloid. Surgery recommended for excision. Risks, benefits and alternatives discussed with the patient and she wished to proceed with surgery.    Description of Procedure:  1% lidocaine with 1:100,000 epinephrine was injected into a planned modified Jose incision and sufficient time was allowed for this to take effect. The skin was incised with a #15 blade and carried down to a sub-SMAS plane.The SMAS flap was then raised in a medially-based fashion over the anterior edge of the tumor. Blunt dissection was then used to identify the digastric tendon, and this was traced back over the posterior belly to the insertion into the mastoid tip. Blunt dissection was then used from a pretragal vector to identify the vaginal process of the EAC and the bony-cartilaginous junction. The remaining suspensory ligaments of the parotid tail were then divided. Meticulous blunt dissection with a Yovanny dissector was performed to identify the main trunk of the facial nerve as it emerged from the stylomastoid foramen. This was followed distally to the pes anserinus, and each individual nerve branch was then meticulously dissected anteriorly until the superficial lobe containing the tumor  was freed from the nerve. The specimen was sent for permanent histopathology. Post-resection stimulation revealed good waveform in all branches, and there was visible facial twitching in all branches. An inferiorly based Sternocleidomastoid rotation flap was created with the Bovie cautery and this flap was then tacked in place overtop of the facial nerve to prevent Anna syndrome. A 10-Fr round Fadi drain was placed into the inferior wound bed and brought out through a separate skin incision. The wound was then closed in layers. Deeply, 3-0 PDS sutures were used to reattached the suspensory ligament. Interrupted 3-0 PDS subcuticular sutures were placed , followed by skin apposition with 6-0 and 4-0 interrupted Prolene sutures. 2 cc of diluted Kenalog-40 1:1 with saline was injected into the incision to aid in avoiding keloid formation. This concluded the procedure. The patient was rotated back to anesthesia and awakened in the operating room without difficulty.    Please note that given the difficulty of the tumor position with relation to the facial nerve, I asked Dr. Nacho Barriga to assist in the case as there were no qualified residents to assist in the procedure.      Estimated Blood Loss: 10 cc     Complications: none         Specimens: Left parotid mass

## 2022-03-21 NOTE — ANESTHESIA PREPROCEDURE EVALUATION
03/21/2022  Yoana Thomas is a 27 y.o., female   Patient Active Problem List   Diagnosis    Anxiety    Attention deficit hyperactivity disorder (ADHD), predominantly inattentive type     .      Pre-op Assessment          Review of Systems      Physical Exam    Airway:  No airway management difficulties anticipated  Dental:No active dental issues noted  Chest/Lungs:  Clear to auscultation    Heart:  Rate: Normal  Rhythm: Regular Rhythm  Sounds: Normal        Anesthesia Plan  Type of Anesthesia, risks & benefits discussed:    Anesthesia Type: Gen ETT  Informed Consent: Informed consent signed with the Patient and all parties understand the risks and agree with anesthesia plan.  All questions answered.   ASA Score: 3  Anesthesia Plan Notes: Chart reviewed. Patient seen and examined. Anesthesia plan discussed and questions answered. E-consent signed. Alec Ibanez MD    Ready For Surgery From Anesthesia Perspective.     .

## 2022-03-21 NOTE — NURSING TRANSFER
Nursing Transfer Note      3/21/2022     Reason patient is being transferred: postop    Transfer To: 1024    Transfer via stretcher    Transfer with n/a    Transported by PCT    Medicines sent: n/a    Chart send with patient: Yes    Notified: mother    Patient reassessed at: 3/21 @0853

## 2022-03-22 ENCOUNTER — PATIENT MESSAGE (OUTPATIENT)
Dept: OTOLARYNGOLOGY | Facility: CLINIC | Age: 27
End: 2022-03-22
Payer: COMMERCIAL

## 2022-03-22 VITALS
OXYGEN SATURATION: 97 % | SYSTOLIC BLOOD PRESSURE: 104 MMHG | DIASTOLIC BLOOD PRESSURE: 59 MMHG | HEART RATE: 72 BPM | RESPIRATION RATE: 18 BRPM | TEMPERATURE: 99 F | BODY MASS INDEX: 34.23 KG/M2 | WEIGHT: 186 LBS | HEIGHT: 62 IN

## 2022-03-22 LAB
ANION GAP SERPL CALC-SCNC: 7 MMOL/L (ref 8–16)
BUN SERPL-MCNC: 10 MG/DL (ref 6–20)
CALCIUM SERPL-MCNC: 8.7 MG/DL (ref 8.7–10.5)
CHLORIDE SERPL-SCNC: 106 MMOL/L (ref 95–110)
CO2 SERPL-SCNC: 25 MMOL/L (ref 23–29)
CREAT SERPL-MCNC: 0.7 MG/DL (ref 0.5–1.4)
EST. GFR  (AFRICAN AMERICAN): >60 ML/MIN/1.73 M^2
EST. GFR  (NON AFRICAN AMERICAN): >60 ML/MIN/1.73 M^2
GLUCOSE SERPL-MCNC: 124 MG/DL (ref 70–110)
POTASSIUM SERPL-SCNC: 3.9 MMOL/L (ref 3.5–5.1)
SODIUM SERPL-SCNC: 138 MMOL/L (ref 136–145)

## 2022-03-22 PROCEDURE — 25000003 PHARM REV CODE 250: Performed by: STUDENT IN AN ORGANIZED HEALTH CARE EDUCATION/TRAINING PROGRAM

## 2022-03-22 PROCEDURE — 36415 COLL VENOUS BLD VENIPUNCTURE: CPT | Performed by: STUDENT IN AN ORGANIZED HEALTH CARE EDUCATION/TRAINING PROGRAM

## 2022-03-22 PROCEDURE — 80048 BASIC METABOLIC PNL TOTAL CA: CPT | Performed by: STUDENT IN AN ORGANIZED HEALTH CARE EDUCATION/TRAINING PROGRAM

## 2022-03-22 RX ORDER — HYDROCODONE BITARTRATE AND ACETAMINOPHEN 5; 325 MG/1; MG/1
1 TABLET ORAL EVERY 4 HOURS PRN
Qty: 12 TABLET | Refills: 0 | Status: ON HOLD | OUTPATIENT
Start: 2022-03-22 | End: 2022-04-21 | Stop reason: HOSPADM

## 2022-03-22 RX ORDER — CEPHALEXIN 500 MG/1
500 CAPSULE ORAL EVERY 6 HOURS
Qty: 8 CAPSULE | Refills: 0 | Status: SHIPPED | OUTPATIENT
Start: 2022-03-22 | End: 2022-03-24

## 2022-03-22 RX ADMIN — CEPHALEXIN 500 MG: 500 CAPSULE ORAL at 05:03

## 2022-03-22 RX ADMIN — PAROXETINE HYDROCHLORIDE 40 MG: 10 TABLET, FILM COATED ORAL at 09:03

## 2022-03-22 NOTE — PLAN OF CARE
Patient had an uneventful night. She denies pain to the left side of neck just states that its tender. Sutures to the incision site remains dry and intact. CHAYA drain draining sanguinous fluid. Patient denies swallowing difficulties. Respirations even and unlabored. Vital signs stable. Patient voiding without complications.    Problem: Adult Inpatient Plan of Care  Goal: Plan of Care Review  Outcome: Ongoing, Progressing  Goal: Optimal Comfort and Wellbeing  Outcome: Ongoing, Progressing     Problem: Pain Acute  Goal: Acceptable Pain Control and Functional Ability  Outcome: Ongoing, Progressing     Problem: Impaired Wound Healing  Goal: Optimal Wound Healing  Outcome: Ongoing, Progressing

## 2022-03-22 NOTE — DISCHARGE SUMMARY
[facility]  Discharge Note  Short Stay    Procedure(s) (LRB):  EXCISION, PAROTID GLAND-nerve monitoring (Left)  CREATION, FLAP, MUSCLE ROTATION-SCM (Left)    OUTCOME: Patient tolerated treatment/procedure well without complication and is now ready for discharge.     Discharge Exam:  Vitals and nursing note reviewed.   Constitutional:       General: She is not in acute distress.     Appearance: She is well-developed. She is not diaphoretic.   HENT:      Head: Normocephalic and atraumatic.   Neck:  Incision is c/d/i with s/s output from CHAYA drain   Neuro:  Weakness in all divisions, HB II in upper division, III in lower division     DISPOSITION: Home or Self Care    FINAL DIAGNOSIS:  Parotid neoplasm    FOLLOWUP: In clinic    DISCHARGE INSTRUCTIONS:    Discharge Procedure Orders   Diet Adult Regular     Lifting restrictions   Order Comments: Avoid heavy lifting or straining for at least 2 weeks     No dressing needed   Order Comments: Avoid getting incision wet until sutures are removed   Avoid scrubbing at incision  Sutures to be removed in ~7 days        TIME SPENT ON DISCHARGE:    minutes

## 2022-03-24 ENCOUNTER — OFFICE VISIT (OUTPATIENT)
Dept: OTOLARYNGOLOGY | Facility: CLINIC | Age: 27
End: 2022-03-24
Payer: COMMERCIAL

## 2022-03-24 VITALS
SYSTOLIC BLOOD PRESSURE: 107 MMHG | HEART RATE: 70 BPM | DIASTOLIC BLOOD PRESSURE: 68 MMHG | BODY MASS INDEX: 34.02 KG/M2 | WEIGHT: 186 LBS

## 2022-03-24 DIAGNOSIS — D49.0 PAROTID NEOPLASM: Primary | ICD-10-CM

## 2022-03-24 PROCEDURE — 99999 PR PBB SHADOW E&M-EST. PATIENT-LVL III: CPT | Mod: PBBFAC,,, | Performed by: NURSE PRACTITIONER

## 2022-03-24 PROCEDURE — 3008F BODY MASS INDEX DOCD: CPT | Mod: CPTII,S$GLB,, | Performed by: NURSE PRACTITIONER

## 2022-03-24 PROCEDURE — 99024 POSTOP FOLLOW-UP VISIT: CPT | Mod: S$GLB,,, | Performed by: NURSE PRACTITIONER

## 2022-03-24 PROCEDURE — 3078F PR MOST RECENT DIASTOLIC BLOOD PRESSURE < 80 MM HG: ICD-10-PCS | Mod: CPTII,S$GLB,, | Performed by: NURSE PRACTITIONER

## 2022-03-24 PROCEDURE — 3074F SYST BP LT 130 MM HG: CPT | Mod: CPTII,S$GLB,, | Performed by: NURSE PRACTITIONER

## 2022-03-24 PROCEDURE — 99024 PR POST-OP FOLLOW-UP VISIT: ICD-10-PCS | Mod: S$GLB,,, | Performed by: NURSE PRACTITIONER

## 2022-03-24 PROCEDURE — 99999 PR PBB SHADOW E&M-EST. PATIENT-LVL III: ICD-10-PCS | Mod: PBBFAC,,, | Performed by: NURSE PRACTITIONER

## 2022-03-24 PROCEDURE — 3074F PR MOST RECENT SYSTOLIC BLOOD PRESSURE < 130 MM HG: ICD-10-PCS | Mod: CPTII,S$GLB,, | Performed by: NURSE PRACTITIONER

## 2022-03-24 PROCEDURE — 3008F PR BODY MASS INDEX (BMI) DOCUMENTED: ICD-10-PCS | Mod: CPTII,S$GLB,, | Performed by: NURSE PRACTITIONER

## 2022-03-24 PROCEDURE — 3078F DIAST BP <80 MM HG: CPT | Mod: CPTII,S$GLB,, | Performed by: NURSE PRACTITIONER

## 2022-03-28 ENCOUNTER — OFFICE VISIT (OUTPATIENT)
Dept: OTOLARYNGOLOGY | Facility: CLINIC | Age: 27
End: 2022-03-28
Payer: COMMERCIAL

## 2022-03-28 ENCOUNTER — PATIENT MESSAGE (OUTPATIENT)
Dept: OTOLARYNGOLOGY | Facility: CLINIC | Age: 27
End: 2022-03-28

## 2022-03-28 VITALS
BODY MASS INDEX: 33.47 KG/M2 | DIASTOLIC BLOOD PRESSURE: 72 MMHG | HEART RATE: 83 BPM | SYSTOLIC BLOOD PRESSURE: 110 MMHG | WEIGHT: 183 LBS

## 2022-03-28 DIAGNOSIS — D49.0 PAROTID NEOPLASM: Primary | ICD-10-CM

## 2022-03-28 PROCEDURE — 3008F PR BODY MASS INDEX (BMI) DOCUMENTED: ICD-10-PCS | Mod: CPTII,S$GLB,, | Performed by: NURSE PRACTITIONER

## 2022-03-28 PROCEDURE — 99024 PR POST-OP FOLLOW-UP VISIT: ICD-10-PCS | Mod: S$GLB,,, | Performed by: NURSE PRACTITIONER

## 2022-03-28 PROCEDURE — 3008F BODY MASS INDEX DOCD: CPT | Mod: CPTII,S$GLB,, | Performed by: NURSE PRACTITIONER

## 2022-03-28 PROCEDURE — 3078F DIAST BP <80 MM HG: CPT | Mod: CPTII,S$GLB,, | Performed by: NURSE PRACTITIONER

## 2022-03-28 PROCEDURE — 3074F PR MOST RECENT SYSTOLIC BLOOD PRESSURE < 130 MM HG: ICD-10-PCS | Mod: CPTII,S$GLB,, | Performed by: NURSE PRACTITIONER

## 2022-03-28 PROCEDURE — 99999 PR PBB SHADOW E&M-EST. PATIENT-LVL III: ICD-10-PCS | Mod: PBBFAC,,, | Performed by: NURSE PRACTITIONER

## 2022-03-28 PROCEDURE — 3074F SYST BP LT 130 MM HG: CPT | Mod: CPTII,S$GLB,, | Performed by: NURSE PRACTITIONER

## 2022-03-28 PROCEDURE — 3078F PR MOST RECENT DIASTOLIC BLOOD PRESSURE < 80 MM HG: ICD-10-PCS | Mod: CPTII,S$GLB,, | Performed by: NURSE PRACTITIONER

## 2022-03-28 PROCEDURE — 99024 POSTOP FOLLOW-UP VISIT: CPT | Mod: S$GLB,,, | Performed by: NURSE PRACTITIONER

## 2022-03-28 PROCEDURE — 99999 PR PBB SHADOW E&M-EST. PATIENT-LVL III: CPT | Mod: PBBFAC,,, | Performed by: NURSE PRACTITIONER

## 2022-03-28 PROCEDURE — 1159F PR MEDICATION LIST DOCUMENTED IN MEDICAL RECORD: ICD-10-PCS | Mod: CPTII,S$GLB,, | Performed by: NURSE PRACTITIONER

## 2022-03-28 PROCEDURE — 1159F MED LIST DOCD IN RCRD: CPT | Mod: CPTII,S$GLB,, | Performed by: NURSE PRACTITIONER

## 2022-03-28 NOTE — PROGRESS NOTES
Subjective:       Patient ID: Yoana Thomas is a 27 y.o. female.    Chief Complaint: Post-op Evaluation    HPI     Yoana Thomas returns for a post op visit. She has been doing well since surgery. No complaints.    Past Medical History:   Diagnosis Date    Anxiety     Attention deficit hyperactivity disorder (ADHD), predominantly inattentive type 6/14/2021       Past Surgical History:   Procedure Laterality Date    CREATION OF MUSCLE ROTATIONAL FLAP Left 3/21/2022    Procedure: CREATION, FLAP, MUSCLE ROTATION-SCM;  Surgeon: Edel Hall MD;  Location: [facility] OR [address] [place] [address];  Service: ENT;  Laterality: Left;    EXCISION OF PAROTID GLAND Left 3/21/2022    Procedure: EXCISION, PAROTID GLAND-nerve monitoring;  Surgeon: Edel Hall MD;  Location: [facility] OR [address] [place] [address];  Service: ENT;  Laterality: Left;    KELOID EXCISION      Left ear and she also had radiation Tx    WISDOM TOOTH EXTRACTION           Current Outpatient Medications:     HYDROcodone-acetaminophen (NORCO) 5-325 mg per tablet, Take 1 tablet by mouth every 4 (four) hours as needed for Pain., Disp: 12 tablet, Rfl: 0    paroxetine (PAXIL) 40 MG tablet, TAKE 1 TABLET BY MOUTH EVERY DAY IN THE MORNING, Disp: 90 tablet, Rfl: 3    Review of patient's allergies indicates:  No Known Allergies    Social History     Socioeconomic History    Marital status: Single   Tobacco Use    Smoking status: Never Smoker    Smokeless tobacco: Never Used   Substance and Sexual Activity    Alcohol use: Yes     Comment: once every couple mos.     Drug use: Never    Sexual activity: Not Currently     Partners: Male     Birth control/protection: Condom       Family History   Problem Relation Age of Onset    Skin cancer Father     Hypertension Father     Infertility Sister     Diabetes type I Maternal Grandmother     Heart disease Maternal Grandmother     Kidney failure Maternal Grandmother         ESRD    Diabetes type II Maternal Grandfather      Lung cancer Paternal Grandfather        Review of Systems   Constitutional: Negative.    HENT: Negative.    Eyes: Negative.    Respiratory: Negative.    Cardiovascular: Negative.    Gastrointestinal: Negative.    Endocrine: Negative.    Genitourinary: Negative.    Musculoskeletal: Negative.    Integumentary:  Negative.   Allergic/Immunologic: Negative.    Neurological: Negative.    Hematological: Negative.    Psychiatric/Behavioral: Negative.          Objective:      Physical Exam  Constitutional:       Appearance: Normal appearance.   HENT:      Head: Normocephalic and atraumatic.      Right Ear: External ear normal.      Left Ear: External ear normal.   Neck:      Comments: Incision CDI.  No redness, drainage, or fluid collection noted.  Edges well approximated.  Sutures intact. CHAYA drain removed.  Pulmonary:      Effort: Pulmonary effort is normal. No respiratory distress.   Neurological:      General: No focal deficit present.      Mental Status: She is alert.   Psychiatric:         Mood and Affect: Mood normal.         Behavior: Behavior normal.         Thought Content: Thought content normal.         Assessment:       Problem List Items Addressed This Visit        Oncology    Parotid neoplasm - Primary     Doing well. Drain removed. Path pending. RTC Monday for suture removal.                 Plan:       Problem List Items Addressed This Visit        Oncology    Parotid neoplasm - Primary     Doing well. Drain removed. Path pending. RTC Monday for suture removal.

## 2022-03-28 NOTE — ASSESSMENT & PLAN NOTE
Doing well. Sutures removed. Incision injected with 45 mg of Kenalog per Dr. Hall to prevent keloid. Questions answered. RTC in 1 week, sooner if needed.

## 2022-03-28 NOTE — PROGRESS NOTES
Subjective:       Patient ID: Yoana Thomas is a 27 y.o. female.    Chief Complaint: post op  HPI     Yoana Thomas returns for a post op visit. She has been doing well since surgery. No complaints.    Past Medical History:   Diagnosis Date    Anxiety     Attention deficit hyperactivity disorder (ADHD), predominantly inattentive type 6/14/2021       Past Surgical History:   Procedure Laterality Date    CREATION OF MUSCLE ROTATIONAL FLAP Left 3/21/2022    Procedure: CREATION, FLAP, MUSCLE ROTATION-SCM;  Surgeon: Edel Hall MD;  Location: [facility] OR [address] [place] [address];  Service: ENT;  Laterality: Left;    EXCISION OF PAROTID GLAND Left 3/21/2022    Procedure: EXCISION, PAROTID GLAND-nerve monitoring;  Surgeon: Edel Hall MD;  Location: [facility] OR [address] [place] [address];  Service: ENT;  Laterality: Left;    KELOID EXCISION      Left ear and she also had radiation Tx    WISDOM TOOTH EXTRACTION           Current Outpatient Medications:     HYDROcodone-acetaminophen (NORCO) 5-325 mg per tablet, Take 1 tablet by mouth every 4 (four) hours as needed for Pain., Disp: 12 tablet, Rfl: 0    paroxetine (PAXIL) 40 MG tablet, TAKE 1 TABLET BY MOUTH EVERY DAY IN THE MORNING, Disp: 90 tablet, Rfl: 3    Review of patient's allergies indicates:  No Known Allergies    Social History     Socioeconomic History    Marital status: Single   Tobacco Use    Smoking status: Never Smoker    Smokeless tobacco: Never Used   Substance and Sexual Activity    Alcohol use: Yes     Comment: once every couple mos.     Drug use: Never    Sexual activity: Not Currently     Partners: Male     Birth control/protection: Condom       Family History   Problem Relation Age of Onset    Skin cancer Father     Hypertension Father     Infertility Sister     Diabetes type I Maternal Grandmother     Heart disease Maternal Grandmother     Kidney failure Maternal Grandmother         ESRD    Diabetes type II Maternal Grandfather     Lung cancer  Paternal Grandfather        Review of Systems   Constitutional: Negative.    HENT: Negative.    Eyes: Negative.    Respiratory: Negative.    Cardiovascular: Negative.    Gastrointestinal: Negative.    Endocrine: Negative.    Genitourinary: Negative.    Musculoskeletal: Negative.    Integumentary:  Negative.   Allergic/Immunologic: Negative.    Neurological: Negative.    Hematological: Negative.    Psychiatric/Behavioral: Negative.          Objective:      Physical Exam  Constitutional:       Appearance: Normal appearance.   HENT:      Head: Normocephalic and atraumatic.      Right Ear: External ear normal.      Left Ear: External ear normal.   Neck:      Comments: Incision CDI.  No redness, drainage, or fluid collection noted.  Edges well approximated.  Sutures removed. 45mg of Kenalog injected into incision by Dr. Hall with 27g needle.  Pulmonary:      Effort: Pulmonary effort is normal. No respiratory distress.   Neurological:      General: No focal deficit present.      Mental Status: She is alert.   Psychiatric:         Mood and Affect: Mood normal.         Behavior: Behavior normal.         Thought Content: Thought content normal.         Assessment:       Problem List Items Addressed This Visit        Oncology    Parotid neoplasm - Primary     Doing well. Sutures removed. Incision injected with 45 mg of Kenalog per Dr. Hall to prevent keloid. Questions answered. RTC in 1 week, sooner if needed.                 Plan:       Problem List Items Addressed This Visit        Oncology    Parotid neoplasm - Primary     Doing well. Sutures removed. Incision injected with 45 mg of Kenalog per Dr. Hall to prevent keloid. Questions answered. RTC in 1 week, sooner if needed.

## 2022-04-04 ENCOUNTER — OFFICE VISIT (OUTPATIENT)
Dept: OTOLARYNGOLOGY | Facility: CLINIC | Age: 27
End: 2022-04-04
Payer: COMMERCIAL

## 2022-04-04 VITALS
TEMPERATURE: 98 F | DIASTOLIC BLOOD PRESSURE: 76 MMHG | SYSTOLIC BLOOD PRESSURE: 117 MMHG | HEART RATE: 73 BPM | BODY MASS INDEX: 34.52 KG/M2 | WEIGHT: 188.69 LBS

## 2022-04-04 DIAGNOSIS — C08.9 MUCOEPIDERMOID CARCINOMA OF SALIVARY GLAND: ICD-10-CM

## 2022-04-04 LAB
COMMENT: NORMAL
FINAL PATHOLOGIC DIAGNOSIS: NORMAL
GROSS: NORMAL
Lab: NORMAL
MICROSCOPIC EXAM: NORMAL

## 2022-04-04 PROCEDURE — 99024 POSTOP FOLLOW-UP VISIT: CPT | Mod: S$GLB,,, | Performed by: NURSE PRACTITIONER

## 2022-04-04 PROCEDURE — 99999 PR PBB SHADOW E&M-EST. PATIENT-LVL III: CPT | Mod: PBBFAC,,, | Performed by: NURSE PRACTITIONER

## 2022-04-04 PROCEDURE — 3074F PR MOST RECENT SYSTOLIC BLOOD PRESSURE < 130 MM HG: ICD-10-PCS | Mod: CPTII,S$GLB,, | Performed by: NURSE PRACTITIONER

## 2022-04-04 PROCEDURE — 1159F PR MEDICATION LIST DOCUMENTED IN MEDICAL RECORD: ICD-10-PCS | Mod: CPTII,S$GLB,, | Performed by: NURSE PRACTITIONER

## 2022-04-04 PROCEDURE — 3008F PR BODY MASS INDEX (BMI) DOCUMENTED: ICD-10-PCS | Mod: CPTII,S$GLB,, | Performed by: NURSE PRACTITIONER

## 2022-04-04 PROCEDURE — 3074F SYST BP LT 130 MM HG: CPT | Mod: CPTII,S$GLB,, | Performed by: NURSE PRACTITIONER

## 2022-04-04 PROCEDURE — 3078F DIAST BP <80 MM HG: CPT | Mod: CPTII,S$GLB,, | Performed by: NURSE PRACTITIONER

## 2022-04-04 PROCEDURE — 99024 PR POST-OP FOLLOW-UP VISIT: ICD-10-PCS | Mod: S$GLB,,, | Performed by: NURSE PRACTITIONER

## 2022-04-04 PROCEDURE — 3078F PR MOST RECENT DIASTOLIC BLOOD PRESSURE < 80 MM HG: ICD-10-PCS | Mod: CPTII,S$GLB,, | Performed by: NURSE PRACTITIONER

## 2022-04-04 PROCEDURE — 3008F BODY MASS INDEX DOCD: CPT | Mod: CPTII,S$GLB,, | Performed by: NURSE PRACTITIONER

## 2022-04-04 PROCEDURE — 99999 PR PBB SHADOW E&M-EST. PATIENT-LVL III: ICD-10-PCS | Mod: PBBFAC,,, | Performed by: NURSE PRACTITIONER

## 2022-04-04 PROCEDURE — 1159F MED LIST DOCD IN RCRD: CPT | Mod: CPTII,S$GLB,, | Performed by: NURSE PRACTITIONER

## 2022-04-04 NOTE — ASSESSMENT & PLAN NOTE
Incision injected with kenalog by Dr. Hall. Path report discussed. Questions answered. PET scan ordered. Will present her case at tumor board this week.

## 2022-04-08 ENCOUNTER — PATIENT MESSAGE (OUTPATIENT)
Dept: OTOLARYNGOLOGY | Facility: CLINIC | Age: 27
End: 2022-04-08
Payer: COMMERCIAL

## 2022-04-08 ENCOUNTER — HOSPITAL ENCOUNTER (OUTPATIENT)
Dept: RADIOLOGY | Facility: HOSPITAL | Age: 27
Discharge: HOME | End: 2022-04-08
Attending: NURSE PRACTITIONER
Payer: COMMERCIAL

## 2022-04-08 DIAGNOSIS — C08.9 MUCOEPIDERMOID CARCINOMA OF SALIVARY GLAND: ICD-10-CM

## 2022-04-08 PROCEDURE — A9552 F18 FDG: HCPCS

## 2022-04-08 PROCEDURE — 78815 PET IMAGE W/CT SKULL-THIGH: CPT | Mod: 26,PI,, | Performed by: RADIOLOGY

## 2022-04-08 PROCEDURE — 78815 PET IMAGE W/CT SKULL-THIGH: CPT | Mod: TC

## 2022-04-08 PROCEDURE — 78815 NM PET CT ROUTINE: ICD-10-PCS | Mod: 26,PI,, | Performed by: RADIOLOGY

## 2022-04-11 ENCOUNTER — TUMOR BOARD CONFERENCE (OUTPATIENT)
Dept: OTOLARYNGOLOGY | Facility: CLINIC | Age: 27
End: 2022-04-11
Payer: COMMERCIAL

## 2022-04-11 NOTE — PROGRESS NOTES
Cancer Staging  Mucoepidermoid carcinoma of salivary gland  Staging form: Major Salivary Glands, AJCC 8th Edition  - Pathologic stage from 4/4/2022: Stage III (pT2, pN1, cM0) - Signed by Mago Macias NP on 4/4/2022    Presenting Hospital / Clinic: [facility]  Virtual Tumor Board Conference: Virtual  Date Presented to Tumor Board: 4/7/2022  Specialties Present: Medical Oncology; Radiation Oncology; Pathology; Navigation; Radiology; Head and Neck; Nutrition; Speech Pathology  Presentation at Cancer Conference: Prospective  Cancer Type: Head and neck cancer  Recommended Plan Note: neck dissection, adjuvant radiation

## 2022-04-18 ENCOUNTER — PATIENT MESSAGE (OUTPATIENT)
Dept: OTOLARYNGOLOGY | Facility: CLINIC | Age: 27
End: 2022-04-18
Payer: COMMERCIAL

## 2022-04-18 ENCOUNTER — TELEPHONE (OUTPATIENT)
Dept: OTOLARYNGOLOGY | Facility: CLINIC | Age: 27
End: 2022-04-18
Payer: COMMERCIAL

## 2022-04-18 DIAGNOSIS — C08.9 MALIGNANT NEOPLASM OF MAJOR SALIVARY GLAND: ICD-10-CM

## 2022-04-18 DIAGNOSIS — C08.9 MUCOEPIDERMOID CARCINOMA OF SALIVARY GLAND: Primary | ICD-10-CM

## 2022-04-18 RX ORDER — SODIUM CHLORIDE 0.9 % (FLUSH) 0.9 %
10 SYRINGE (ML) INJECTION
Status: CANCELLED | OUTPATIENT
Start: 2022-04-18

## 2022-04-18 RX ORDER — LIDOCAINE HYDROCHLORIDE 10 MG/ML
1 INJECTION, SOLUTION EPIDURAL; INFILTRATION; INTRACAUDAL; PERINEURAL ONCE
Status: CANCELLED | OUTPATIENT
Start: 2022-04-18 | End: 2022-04-18

## 2022-04-19 ENCOUNTER — PATIENT MESSAGE (OUTPATIENT)
Dept: OTOLARYNGOLOGY | Facility: CLINIC | Age: 27
End: 2022-04-19
Payer: COMMERCIAL

## 2022-04-19 ENCOUNTER — ANESTHESIA EVENT (OUTPATIENT)
Dept: SURGERY | Facility: HOSPITAL | Age: 27
DRG: 822 | End: 2022-04-19
Payer: COMMERCIAL

## 2022-04-19 ENCOUNTER — TELEPHONE (OUTPATIENT)
Dept: OTOLARYNGOLOGY | Facility: CLINIC | Age: 27
End: 2022-04-19
Payer: COMMERCIAL

## 2022-04-19 NOTE — ANESTHESIA PREPROCEDURE EVALUATION
[facility]  Anesthesia Pre-Operative Evaluation         Patient Name: Yoana Thomas  YOB: 1995  MRN: [id number]    SUBJECTIVE:     Pre-operative evaluation for Procedure(s) (LRB):  DISSECTION, NECK (Left)     04/19/2022    Yoana Thomas is a 27 y.o. female w/ a significant PMHx of mucoepidermoid carcinoma of salivary gland, ADHD and anxiety.    Patient now presents for the above procedure(s).      LDA:        Peripheral IV - Single Lumen 03/21/22 1540 20 G Left;Posterior Hand (Active)   Site Assessment Clean;Dry;Intact;No redness;No swelling 03/21/22 1909   Extremity Assessment Distal to IV No abnormal discoloration;No redness;No swelling;No warmth 03/21/22 1909   Line Status Flushed 03/21/22 1909   Dressing Status Clean;Dry;Intact 03/21/22 1909   Dressing Intervention Integrity maintained 03/21/22 1909   Dressing Change Due 03/25/22 03/21/22 1909   Site Change Due 03/25/22 03/21/22 1909   Reason Not Rotated Not due 03/21/22 1909   Number of days: 29            Closed/Suction Drain 03/21/22 1358 Left;Lateral Neck Bulb 10 Fr. (Active)   Site Description Healing 03/21/22 1909   Dressing Type Other (Comment) 03/21/22 1730   Drainage Sanguineous 03/21/22 1909   Status To bulb suction 03/21/22 1909   Output (mL) 30 mL 03/22/22 0532   Number of days: 29       Prev airway: Placement Date: 03/21/22; Placement Time: 1020 (created via procedure documentation); Method of Intubation: Direct laryngoscopy; Mask Ventilation: Easy; Intubated: Postinduction; Blade: [place]lette #2; Airway Device Size: 7.0; Cuff Inflation: Minimal occlusive pressure; Placement Verified By: Capnometry; Complicating Factors: None; Intubation Findings: Bilateral breath sounds, Atraumatic/Condition of teeth unchanged; Securment: Lips; Complications: None; Removal Date: 03/21/22;  Removal Time: 1522    Drips: None documented.      Patient Active Problem List   Diagnosis    Anxiety    Attention deficit  hyperactivity disorder (ADHD), predominantly inattentive type    Parotid neoplasm    Mucoepidermoid carcinoma of salivary gland       Review of patient's allergies indicates:  No Known Allergies    Current Inpatient Medications:      No current facility-administered medications on file prior to encounter.     Current Outpatient Medications on File Prior to Encounter   Medication Sig Dispense Refill    HYDROcodone-acetaminophen (NORCO) 5-325 mg per tablet Take 1 tablet by mouth every 4 (four) hours as needed for Pain. 12 tablet 0    paroxetine (PAXIL) 40 MG tablet TAKE 1 TABLET BY MOUTH EVERY DAY IN THE MORNING 90 tablet 3       Past Surgical History:   Procedure Laterality Date    CREATION OF MUSCLE ROTATIONAL FLAP Left 3/21/2022    Procedure: CREATION, FLAP, MUSCLE ROTATION-SCM;  Surgeon: Edel Hall MD;  Location: [facility] OR [address] [place] [address];  Service: ENT;  Laterality: Left;    EXCISION OF PAROTID GLAND Left 3/21/2022    Procedure: EXCISION, PAROTID GLAND-nerve monitoring;  Surgeon: Edel Hall MD;  Location: [facility] OR [address] [place] [address];  Service: ENT;  Laterality: Left;    KELOID EXCISION      Left ear and she also had radiation Tx    WISDOM TOOTH EXTRACTION         Social History     Socioeconomic History    Marital status: Single   Tobacco Use    Smoking status: Never Smoker    Smokeless tobacco: Never Used   Substance and Sexual Activity    Alcohol use: Yes     Comment: once every couple mos.     Drug use: Never    Sexual activity: Not Currently     Partners: Male     Birth control/protection: Condom       OBJECTIVE:     Vital Signs Range (Last 24H):         Significant Labs:  Lab Results   Component Value Date    WBC 8.87 02/07/2022    HGB 14.2 02/07/2022    HCT 43.6 02/07/2022     02/07/2022     03/22/2022    K 3.9 03/22/2022     03/22/2022    CREATININE 0.7 03/22/2022    BUN 10 03/22/2022    CO2 25 03/22/2022       Diagnostic Studies: No relevant studies.    EKG:   Results for  "orders placed or performed during the hospital encounter of 03/16/22   EKG 12-lead    Collection Time: 03/16/22  7:46 AM    Narrative    Test Reason : Z01.818,    Vent. Rate : 065 BPM     Atrial Rate : 065 BPM     P-R Int : 160 ms          QRS Dur : 080 ms      QT Int : 392 ms       P-R-T Axes : -05 015 002 degrees     QTc Int : 407 ms    Normal sinus rhythm  Normal ECG  No previous ECGs available  Confirmed by Tay ORR, Umer RENDON ([id number]) on 3/16/2022 8:29:58 AM    Referred By: RHONDA LEOPOLD           Confirmed By:Umer Cross MD       2D ECHO:  TTE:  No results found for this or any previous visit.    NAEL:  No results found for this or any previous visit.    ASSESSMENT/PLAN:                                                                                                                  04/19/2022  Yoana Thomas is a 27 y.o., female.      Pre-op Assessment    I have reviewed the Patient Summary Reports.    I have reviewed the NPO Status.   I have reviewed the Medications.     Review of Systems  Anesthesia Hx:  Denies Hx of Anesthetic complications  History of prior surgery of interest to airway management or planning: facial plastic/reconstructive. Previous anesthesia: General NASAL FRACTURE REPAIR "IN [employer]" with general anesthesia.  Denies Family Hx of Anesthesia complications.   Denies Personal Hx of Anesthesia complications.   Social:  No Alcohol Use, Non-Smoker    Hematology/Oncology:  Hematology Normal       -- Cancer in past history:  radiation    EENT/Dental:   PAROTID MASS.   Cardiovascular:    Denies Angina.  Functional Capacity good / => 4 METS    Pulmonary:   Denies Shortness of breath.  Denies Recent URI.    Renal/:  Renal/ Normal     Hepatic/GI:  Hepatic/GI Normal    Musculoskeletal:  Musculoskeletal Normal    Neurological:  Neurology Normal    Endocrine:  Obesity / BMI > 30  Psych:   Psychiatric History anxiety          Physical Exam  General: Well " nourished    Airway:  Mallampati: II   Mouth Opening: Normal  TM Distance: Normal  Tongue: Normal  Neck ROM: Normal ROM  No airway management difficulties anticipated  Dental:No active dental issues noted      Anesthesia Plan  Type of Anesthesia, risks & benefits discussed:    Anesthesia Type: Gen ETT  Intra-op Monitoring Plan: Standard ASA Monitors  Post Op Pain Control Plan: multimodal analgesia and IV/PO Opioids PRN  Induction:  IV  Airway Plan: Direct, Post-Induction  Informed Consent: Informed consent signed with the Patient and all parties understand the risks and agree with anesthesia plan.  All questions answered.   ASA Score: 3  Day of Surgery Review of History & Physical: H&P Update referred to the surgeon/provider.    Ready For Surgery From Anesthesia Perspective.     .

## 2022-04-20 ENCOUNTER — HOSPITAL ENCOUNTER (INPATIENT)
Facility: HOSPITAL | Age: 27
LOS: 1 days | Discharge: HOME | DRG: 822 | End: 2022-04-21
Attending: OTOLARYNGOLOGY | Admitting: OTOLARYNGOLOGY
Payer: COMMERCIAL

## 2022-04-20 ENCOUNTER — ANESTHESIA (OUTPATIENT)
Dept: SURGERY | Facility: HOSPITAL | Age: 27
DRG: 822 | End: 2022-04-20
Payer: COMMERCIAL

## 2022-04-20 DIAGNOSIS — D49.0 PAROTID NEOPLASM: ICD-10-CM

## 2022-04-20 DIAGNOSIS — C08.9 MUCOEPIDERMOID CARCINOMA OF SALIVARY GLAND: Primary | ICD-10-CM

## 2022-04-20 DIAGNOSIS — C08.9 MALIGNANT NEOPLASM OF MAJOR SALIVARY GLAND: ICD-10-CM

## 2022-04-20 LAB
B-HCG UR QL: NEGATIVE
CTP QC/QA: YES

## 2022-04-20 PROCEDURE — 25000003 PHARM REV CODE 250: Performed by: STUDENT IN AN ORGANIZED HEALTH CARE EDUCATION/TRAINING PROGRAM

## 2022-04-20 PROCEDURE — 38724 REMOVAL OF LYMPH NODES NECK: CPT | Mod: 58,82,LT, | Performed by: OTOLARYNGOLOGY

## 2022-04-20 PROCEDURE — C1729 CATH, DRAINAGE: HCPCS | Performed by: OTOLARYNGOLOGY

## 2022-04-20 PROCEDURE — 88307 PR  SURG PATH,LEVEL V: ICD-10-PCS | Mod: 26,,, | Performed by: STUDENT IN AN ORGANIZED HEALTH CARE EDUCATION/TRAINING PROGRAM

## 2022-04-20 PROCEDURE — 25000003 PHARM REV CODE 250: Performed by: OTOLARYNGOLOGY

## 2022-04-20 PROCEDURE — 38724 PR REMOVAL NODES, NECK,CERV MOD RAD: ICD-10-PCS | Mod: 58,LT,, | Performed by: OTOLARYNGOLOGY

## 2022-04-20 PROCEDURE — 63600175 PHARM REV CODE 636 W HCPCS: Performed by: OTOLARYNGOLOGY

## 2022-04-20 PROCEDURE — 94761 N-INVAS EAR/PLS OXIMETRY MLT: CPT

## 2022-04-20 PROCEDURE — 71000033 HC RECOVERY, INTIAL HOUR: Performed by: OTOLARYNGOLOGY

## 2022-04-20 PROCEDURE — 38724 PR REMOVAL NODES, NECK,CERV MOD RAD: ICD-10-PCS | Mod: 58,82,LT, | Performed by: OTOLARYNGOLOGY

## 2022-04-20 PROCEDURE — 27201423 OPTIME MED/SURG SUP & DEVICES STERILE SUPPLY: Performed by: OTOLARYNGOLOGY

## 2022-04-20 PROCEDURE — 71000015 HC POSTOP RECOV 1ST HR: Performed by: OTOLARYNGOLOGY

## 2022-04-20 PROCEDURE — 37000008 HC ANESTHESIA 1ST 15 MINUTES: Performed by: OTOLARYNGOLOGY

## 2022-04-20 PROCEDURE — 37000009 HC ANESTHESIA EA ADD 15 MINS: Performed by: OTOLARYNGOLOGY

## 2022-04-20 PROCEDURE — D9220A PRA ANESTHESIA: ICD-10-PCS | Mod: ,,, | Performed by: ANESTHESIOLOGY

## 2022-04-20 PROCEDURE — 81025 URINE PREGNANCY TEST: CPT | Performed by: OTOLARYNGOLOGY

## 2022-04-20 PROCEDURE — 36000706: Performed by: OTOLARYNGOLOGY

## 2022-04-20 PROCEDURE — 20600001 HC STEP DOWN PRIVATE ROOM

## 2022-04-20 PROCEDURE — 38724 REMOVAL OF LYMPH NODES NECK: CPT | Mod: 58,LT,, | Performed by: OTOLARYNGOLOGY

## 2022-04-20 PROCEDURE — 88307 TISSUE EXAM BY PATHOLOGIST: CPT | Performed by: STUDENT IN AN ORGANIZED HEALTH CARE EDUCATION/TRAINING PROGRAM

## 2022-04-20 PROCEDURE — D9220A PRA ANESTHESIA: Mod: ,,, | Performed by: ANESTHESIOLOGY

## 2022-04-20 PROCEDURE — 63600175 PHARM REV CODE 636 W HCPCS: Performed by: STUDENT IN AN ORGANIZED HEALTH CARE EDUCATION/TRAINING PROGRAM

## 2022-04-20 PROCEDURE — 88307 TISSUE EXAM BY PATHOLOGIST: CPT | Mod: 26,,, | Performed by: STUDENT IN AN ORGANIZED HEALTH CARE EDUCATION/TRAINING PROGRAM

## 2022-04-20 PROCEDURE — 36000707: Performed by: OTOLARYNGOLOGY

## 2022-04-20 RX ORDER — CEPHALEXIN 500 MG/1
500 CAPSULE ORAL EVERY 12 HOURS
Status: DISCONTINUED | OUTPATIENT
Start: 2022-04-20 | End: 2022-04-21 | Stop reason: HOSPADM

## 2022-04-20 RX ORDER — LIDOCAINE HYDROCHLORIDE 10 MG/ML
1 INJECTION, SOLUTION EPIDURAL; INFILTRATION; INTRACAUDAL; PERINEURAL ONCE
Status: DISCONTINUED | OUTPATIENT
Start: 2022-04-20 | End: 2022-04-21 | Stop reason: HOSPADM

## 2022-04-20 RX ORDER — KETAMINE HCL IN 0.9 % NACL 50 MG/5 ML
SYRINGE (ML) INTRAVENOUS
Status: DISCONTINUED | OUTPATIENT
Start: 2022-04-20 | End: 2022-04-20

## 2022-04-20 RX ORDER — LIDOCAINE HYDROCHLORIDE 10 MG/ML
1 INJECTION, SOLUTION EPIDURAL; INFILTRATION; INTRACAUDAL; PERINEURAL ONCE
Status: DISCONTINUED | OUTPATIENT
Start: 2022-04-20 | End: 2022-04-20

## 2022-04-20 RX ORDER — LIDOCAINE HYDROCHLORIDE AND EPINEPHRINE 10; 10 MG/ML; UG/ML
INJECTION, SOLUTION INFILTRATION; PERINEURAL
Status: DISCONTINUED | OUTPATIENT
Start: 2022-04-20 | End: 2022-04-20 | Stop reason: HOSPADM

## 2022-04-20 RX ORDER — CEFAZOLIN SODIUM/WATER 2 G/20 ML
2 SYRINGE (ML) INTRAVENOUS
Status: COMPLETED | OUTPATIENT
Start: 2022-04-20 | End: 2022-04-20

## 2022-04-20 RX ORDER — ENOXAPARIN SODIUM 100 MG/ML
40 INJECTION SUBCUTANEOUS EVERY 24 HOURS
Status: DISCONTINUED | OUTPATIENT
Start: 2022-04-21 | End: 2022-04-21 | Stop reason: HOSPADM

## 2022-04-20 RX ORDER — ONDANSETRON 2 MG/ML
4 INJECTION INTRAMUSCULAR; INTRAVENOUS EVERY 6 HOURS PRN
Status: DISCONTINUED | OUTPATIENT
Start: 2022-04-20 | End: 2022-04-21 | Stop reason: HOSPADM

## 2022-04-20 RX ORDER — LIDOCAINE HYDROCHLORIDE 20 MG/ML
INJECTION, SOLUTION EPIDURAL; INFILTRATION; INTRACAUDAL; PERINEURAL
Status: DISCONTINUED | OUTPATIENT
Start: 2022-04-20 | End: 2022-04-20

## 2022-04-20 RX ORDER — MORPHINE SULFATE 4 MG/ML
4 INJECTION, SOLUTION INTRAMUSCULAR; INTRAVENOUS EVERY 6 HOURS PRN
Status: DISCONTINUED | OUTPATIENT
Start: 2022-04-20 | End: 2022-04-20

## 2022-04-20 RX ORDER — HYDROCODONE BITARTRATE AND ACETAMINOPHEN 10; 325 MG/1; MG/1
1 TABLET ORAL EVERY 4 HOURS PRN
Status: DISCONTINUED | OUTPATIENT
Start: 2022-04-20 | End: 2022-04-21 | Stop reason: HOSPADM

## 2022-04-20 RX ORDER — FENTANYL CITRATE 50 UG/ML
INJECTION, SOLUTION INTRAMUSCULAR; INTRAVENOUS
Status: DISCONTINUED | OUTPATIENT
Start: 2022-04-20 | End: 2022-04-20

## 2022-04-20 RX ORDER — SODIUM CHLORIDE 0.9 % (FLUSH) 0.9 %
10 SYRINGE (ML) INJECTION
Status: DISCONTINUED | OUTPATIENT
Start: 2022-04-20 | End: 2022-04-21 | Stop reason: HOSPADM

## 2022-04-20 RX ORDER — MIDAZOLAM HYDROCHLORIDE 1 MG/ML
INJECTION, SOLUTION INTRAMUSCULAR; INTRAVENOUS
Status: DISCONTINUED | OUTPATIENT
Start: 2022-04-20 | End: 2022-04-20

## 2022-04-20 RX ORDER — PROPOFOL 10 MG/ML
VIAL (ML) INTRAVENOUS
Status: DISCONTINUED | OUTPATIENT
Start: 2022-04-20 | End: 2022-04-20

## 2022-04-20 RX ORDER — DEXMEDETOMIDINE HYDROCHLORIDE 100 UG/ML
INJECTION, SOLUTION INTRAVENOUS
Status: DISCONTINUED | OUTPATIENT
Start: 2022-04-20 | End: 2022-04-20

## 2022-04-20 RX ORDER — HYDROMORPHONE HYDROCHLORIDE 2 MG/ML
INJECTION, SOLUTION INTRAMUSCULAR; INTRAVENOUS; SUBCUTANEOUS
Status: DISCONTINUED | OUTPATIENT
Start: 2022-04-20 | End: 2022-04-20

## 2022-04-20 RX ORDER — HYDROMORPHONE HYDROCHLORIDE 1 MG/ML
0.2 INJECTION, SOLUTION INTRAMUSCULAR; INTRAVENOUS; SUBCUTANEOUS EVERY 5 MIN PRN
Status: DISCONTINUED | OUTPATIENT
Start: 2022-04-20 | End: 2022-04-20 | Stop reason: HOSPADM

## 2022-04-20 RX ORDER — HYDROCODONE BITARTRATE AND ACETAMINOPHEN 5; 325 MG/1; MG/1
1 TABLET ORAL EVERY 4 HOURS PRN
Status: DISCONTINUED | OUTPATIENT
Start: 2022-04-20 | End: 2022-04-21 | Stop reason: HOSPADM

## 2022-04-20 RX ORDER — ACETAMINOPHEN 325 MG/1
650 TABLET ORAL EVERY 4 HOURS PRN
Status: DISCONTINUED | OUTPATIENT
Start: 2022-04-20 | End: 2022-04-21 | Stop reason: HOSPADM

## 2022-04-20 RX ORDER — SODIUM CHLORIDE 0.9 % (FLUSH) 0.9 %
10 SYRINGE (ML) INJECTION
Status: DISCONTINUED | OUTPATIENT
Start: 2022-04-20 | End: 2022-04-20

## 2022-04-20 RX ORDER — SUCCINYLCHOLINE CHLORIDE 20 MG/ML
INJECTION INTRAMUSCULAR; INTRAVENOUS
Status: DISCONTINUED | OUTPATIENT
Start: 2022-04-20 | End: 2022-04-20

## 2022-04-20 RX ORDER — MORPHINE SULFATE 4 MG/ML
4 INJECTION, SOLUTION INTRAMUSCULAR; INTRAVENOUS EVERY 6 HOURS PRN
Status: DISCONTINUED | OUTPATIENT
Start: 2022-04-20 | End: 2022-04-21 | Stop reason: HOSPADM

## 2022-04-20 RX ORDER — TALC
6 POWDER (GRAM) TOPICAL NIGHTLY PRN
Status: DISCONTINUED | OUTPATIENT
Start: 2022-04-20 | End: 2022-04-21 | Stop reason: HOSPADM

## 2022-04-20 RX ORDER — ROCURONIUM BROMIDE 10 MG/ML
INJECTION, SOLUTION INTRAVENOUS
Status: DISCONTINUED | OUTPATIENT
Start: 2022-04-20 | End: 2022-04-20

## 2022-04-20 RX ORDER — TRIAMCINOLONE ACETONIDE 40 MG/ML
INJECTION, SUSPENSION INTRA-ARTICULAR; INTRAMUSCULAR
Status: DISCONTINUED | OUTPATIENT
Start: 2022-04-20 | End: 2022-04-20 | Stop reason: HOSPADM

## 2022-04-20 RX ORDER — PAROXETINE 10 MG/1
40 TABLET, FILM COATED ORAL DAILY
Status: DISCONTINUED | OUTPATIENT
Start: 2022-04-21 | End: 2022-04-21 | Stop reason: HOSPADM

## 2022-04-20 RX ORDER — PHENYLEPHRINE HCL IN 0.9% NACL 1 MG/10 ML
SYRINGE (ML) INTRAVENOUS
Status: DISCONTINUED | OUTPATIENT
Start: 2022-04-20 | End: 2022-04-20

## 2022-04-20 RX ORDER — SODIUM CHLORIDE 0.9 % (FLUSH) 0.9 %
10 SYRINGE (ML) INJECTION
Status: DISCONTINUED | OUTPATIENT
Start: 2022-04-20 | End: 2022-04-20 | Stop reason: HOSPADM

## 2022-04-20 RX ORDER — ACETAMINOPHEN 10 MG/ML
INJECTION, SOLUTION INTRAVENOUS
Status: DISCONTINUED | OUTPATIENT
Start: 2022-04-20 | End: 2022-04-20

## 2022-04-20 RX ORDER — DEXAMETHASONE SODIUM PHOSPHATE 4 MG/ML
INJECTION, SOLUTION INTRA-ARTICULAR; INTRALESIONAL; INTRAMUSCULAR; INTRAVENOUS; SOFT TISSUE
Status: DISCONTINUED | OUTPATIENT
Start: 2022-04-20 | End: 2022-04-20

## 2022-04-20 RX ORDER — ONDANSETRON 2 MG/ML
INJECTION INTRAMUSCULAR; INTRAVENOUS
Status: DISCONTINUED | OUTPATIENT
Start: 2022-04-20 | End: 2022-04-20

## 2022-04-20 RX ORDER — BACITRACIN ZINC 500 UNIT/G
OINTMENT (GRAM) TOPICAL
Status: DISCONTINUED | OUTPATIENT
Start: 2022-04-20 | End: 2022-04-20 | Stop reason: HOSPADM

## 2022-04-20 RX ADMIN — SUCCINYLCHOLINE CHLORIDE 120 MG: 20 INJECTION, SOLUTION INTRAMUSCULAR; INTRAVENOUS; PARENTERAL at 08:04

## 2022-04-20 RX ADMIN — Medication 2 G: at 08:04

## 2022-04-20 RX ADMIN — GLYCOPYRROLATE 0.2 MG: 0.2 INJECTION INTRAMUSCULAR; INTRAVENOUS at 10:04

## 2022-04-20 RX ADMIN — Medication 100 MCG: at 10:04

## 2022-04-20 RX ADMIN — DEXMEDETOMIDINE HYDROCHLORIDE 8 MCG: 100 INJECTION, SOLUTION INTRAVENOUS at 08:04

## 2022-04-20 RX ADMIN — HYDROCODONE BITARTRATE AND ACETAMINOPHEN 1 TABLET: 10; 325 TABLET ORAL at 09:04

## 2022-04-20 RX ADMIN — Medication 100 MCG: at 09:04

## 2022-04-20 RX ADMIN — Medication 10 MG: at 09:04

## 2022-04-20 RX ADMIN — ACETAMINOPHEN 1000 MG: 10 INJECTION INTRAVENOUS at 08:04

## 2022-04-20 RX ADMIN — DEXAMETHASONE SODIUM PHOSPHATE 4 MG: 4 INJECTION INTRA-ARTICULAR; INTRALESIONAL; INTRAMUSCULAR; INTRAVENOUS; SOFT TISSUE at 08:04

## 2022-04-20 RX ADMIN — PROPOFOL 180 MG: 10 INJECTION, EMULSION INTRAVENOUS at 08:04

## 2022-04-20 RX ADMIN — SODIUM CHLORIDE, SODIUM GLUCONATE, SODIUM ACETATE, POTASSIUM CHLORIDE, MAGNESIUM CHLORIDE, SODIUM PHOSPHATE, DIBASIC, AND POTASSIUM PHOSPHATE: .53; .5; .37; .037; .03; .012; .00082 INJECTION, SOLUTION INTRAVENOUS at 09:04

## 2022-04-20 RX ADMIN — CEPHALEXIN 500 MG: 500 CAPSULE ORAL at 01:04

## 2022-04-20 RX ADMIN — PROPOFOL 20 MG: 10 INJECTION, EMULSION INTRAVENOUS at 08:04

## 2022-04-20 RX ADMIN — SODIUM CHLORIDE: 0.9 INJECTION, SOLUTION INTRAVENOUS at 08:04

## 2022-04-20 RX ADMIN — ROCURONIUM BROMIDE 45 MG: 10 INJECTION, SOLUTION INTRAVENOUS at 08:04

## 2022-04-20 RX ADMIN — ONDANSETRON 4 MG: 2 INJECTION INTRAMUSCULAR; INTRAVENOUS at 09:04

## 2022-04-20 RX ADMIN — LIDOCAINE HYDROCHLORIDE 70 MG: 20 INJECTION, SOLUTION EPIDURAL; INFILTRATION; INTRACAUDAL; PERINEURAL at 08:04

## 2022-04-20 RX ADMIN — ROCURONIUM BROMIDE 5 MG: 10 INJECTION, SOLUTION INTRAVENOUS at 08:04

## 2022-04-20 RX ADMIN — HYDROCODONE BITARTRATE AND ACETAMINOPHEN 1 TABLET: 10; 325 TABLET ORAL at 11:04

## 2022-04-20 RX ADMIN — MIDAZOLAM 2 MG: 1 INJECTION INTRAMUSCULAR; INTRAVENOUS at 08:04

## 2022-04-20 RX ADMIN — CEPHALEXIN 500 MG: 500 CAPSULE ORAL at 09:04

## 2022-04-20 RX ADMIN — FENTANYL CITRATE 50 MCG: 50 INJECTION INTRAMUSCULAR; INTRAVENOUS at 08:04

## 2022-04-20 RX ADMIN — SUGAMMADEX 200 MG: 100 INJECTION, SOLUTION INTRAVENOUS at 10:04

## 2022-04-20 RX ADMIN — Medication 100 MCG: at 08:04

## 2022-04-20 RX ADMIN — Medication 20 MG: at 08:04

## 2022-04-20 RX ADMIN — HYDROMORPHONE HYDROCHLORIDE 0.4 MG: 2 INJECTION INTRAMUSCULAR; INTRAVENOUS; SUBCUTANEOUS at 09:04

## 2022-04-20 NOTE — H&P
Now s/p left parotidectomy. Here today for neck dissection. No major changes since last post op visit.    Previous HPI: 26 y.o. female presents with left infraauricular mass. She first noticed it months ago as a small nodular swelling. History significant for keloid removal on left earlobe treated with post-excision XRT in 2016. No recent trauma. The mass has increased in size over past six weeks. It is not painful, but is mildly tender on palpation. No ear pain, pruritis or discharge, no skin lesions in the area. Has not noticed any lumps or swollen glands elsewhere on her body.      Here for follow up after CT and FNA. No changes since last visit.        Review of Systems      Constitutional: Negative for fatigue and unexpected weight change.   HENT: per HPI.  Eyes: Negative for visual disturbance.   Respiratory: Negative for shortness of breath, hemoptysis  Cardiovascular: Negative for chest pain and palpitations.   Genitourinary: Negative for dysuria and difficulty urinating.   Musculoskeletal: Negative for decreased ROM, back pain.   Skin: Negative for rash, sunburn, itching.   Neurological: Negative for dizziness and seizures.   Hematological: Negative for adenopathy. Does not bruise/bleed easily.   Psychiatric/Behavioral: Negative for agitation. The patient is not nervous/anxious.   Endocrine: Negative for rapid weight loss/weight gain, heat/cold intolerance.           Past Medical History:   Diagnosis Date    Anxiety      Attention deficit hyperactivity disorder (ADHD), predominantly inattentive type 6/14/2021               Past Surgical History:   Procedure Laterality Date    KELOID EXCISION         Left ear and she also had radiation Tx    WISDOM TOOTH EXTRACTION             family history includes Diabetes type I in her maternal grandmother; Diabetes type II in her maternal grandfather; Heart disease in her maternal grandmother; Hypertension in her father; Infertility in her sister; Kidney failure in  her maternal grandmother; Lung cancer in her paternal grandfather; Skin cancer in her father.     Pt  reports that she has never smoked. She has never used smokeless tobacco. She reports current alcohol use. She reports that she does not use drugs.     Review of patient's allergies indicates:  No Known Allergies      Physical Exam         Vitals:     03/08/22 0922   BP: 124/82   Pulse: 84   Temp: 98.2 °F (36.8 °C)      Body mass index is 34.44 kg/m².     Physical Exam  Vitals and nursing note reviewed.   Constitutional:       General: She is not in acute distress.     Appearance: She is well-developed. She is not diaphoretic.   HENT:      Head: Normocephalic and atraumatic.      Right Ear: Hearing, tympanic membrane, ear canal and external ear normal. No decreased hearing noted. No middle ear effusion. Tympanic membrane has normal mobility.      Left Ear: Hearing, tympanic membrane, ear canal and external ear normal. No decreased hearing noted.  No middle ear effusion. Tympanic membrane has normal mobility.      Nose: Nose normal.   Eyes:      General: Lids are normal.         Right eye: No discharge.         Left eye: No discharge.      Conjunctiva/sclera: Conjunctivae normal.      Pupils: Pupils are equal, round, and reactive to light.   Neck:      Thyroid: No thyroid mass or thyromegaly.      Trachea: Trachea and phonation normal. No tracheal tenderness or tracheal deviation.   Cardiovascular:      Heart sounds: Normal heart sounds.   Pulmonary:      Breath sounds: Normal breath sounds. No stridor.   Abdominal:      Palpations: Abdomen is soft.   Musculoskeletal:      Cervical back: Normal range of motion and neck supple. No edema or erythema.   Lymphadenopathy:      Cervical: No cervical adenopathy.   Skin:     General: Skin is warm and dry.      Coloration: Skin is not pale.      Findings: No erythema or rash.   Neurological:      Mental Status: She is alert and oriented to person, place, and time.   "     Studies Reviewed:     CT Neck 3/4/22:  Impression:     Nonspecific irregular mixed cystic and solid  superficial left parotid mass.  The lesion enhances prominently and may be somewhat vascular in nature.  Adjacent prominent intraparotid lymph nodes suggested.  Borderline in size nonspecific left upper jugular chain lymph node with smaller clustered adjacent nodes.     Pathology:     SUBMITTED AS "LEFT TAIL OF PAROTID MASS", FINE NEEDLE ASPIRATION (PATHOLOGIST   PERFORMED):   - Atypical cells present:   - Rare groups of epithelial cells with focal intracellular mucin in a   background of mucinous cyst contents      Overall, the findings in this case are suspicious for a low-grade mucinous   cystic lesion, such as low-grade mucoepidermoid carcinoma or Warthin tumor   with mucinous metaplasia. The scant cellularity of the specimen makes it   difficult to entirely exclude a non-neoplastic process, such as acquired   mucinous cyst or sialadenitis/sialolithiasis with mucinous metaplasia of   ducts.     Assessment     1. Parotid mass             Plan  To OR for left neck dissection    "

## 2022-04-20 NOTE — PLAN OF CARE
Pt came from PACU today around 1201    POC reviewed with pt and mother, verbalized understanding. VSS on RA. AAOX4. Remains free of falls and injury. Ambulated to washroom.    - LT necks sutures  - LT neck CHAYA drain w/ sanguinous drainage  - voiding      Tolerating regular diet, denies nausea. Pain controlled. Patient denies chest pain & SOB. TEDS in place. No acute events or distress noted. Bed in lowest position, call light in reach, frequent rounds made for safety.     WCTM.      Problem: Adult Inpatient Plan of Care  Goal: Plan of Care Review  Outcome: Ongoing, Progressing  Goal: Patient-Specific Goal (Individualized)  Outcome: Ongoing, Progressing  Goal: Absence of Hospital-Acquired Illness or Injury  Outcome: Ongoing, Progressing  Goal: Optimal Comfort and Wellbeing  Outcome: Ongoing, Progressing

## 2022-04-20 NOTE — PLAN OF CARE
Vital signs stable. Afebrile. Drowsy, oriented and following commands. Pain controlled with PRN meds. Denies nausea. Incision remains well approximated and without drainage. CHAYA to bulb suction. POC reviewed and understanding verbalized. Mother updated via text

## 2022-04-20 NOTE — NURSING TRANSFER
Nursing Transfer Note      4/20/2022     Reason patient is being transferred: per md order    Transfer to 1008    Transfer via stretcher    Transfer with n/a    Transported by pct    Medicines sent: n/a    Any special needs or follow-up needed: n/a    Chart send with patient: yes    Notified: mother via text

## 2022-04-20 NOTE — PLAN OF CARE
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 No    Discharge Plan A: Home  Discharge Plan B: [id number] [place] - [address]  AT [facility] [address]  [address] [place] [address]  Phone: [phone] Fax: [phone]      Initial Assessment (most recent)     Adult Discharge Assessment - 04/20/22 1532        Discharge Assessment    Assessment Type Discharge Planning Assessment     Confirmed/corrected address, phone number and insurance Yes     Confirmed Demographics Correct on Facesheet     Source of Information patient     Communicated BUTCH with patient/caregiver Yes     Lives With alone     Do you expect to return to your current living situation? Yes     Do you have help at home or someone to help you manage your care at home? Yes     Prior to hospitilization cognitive status: Alert/Oriented     Current cognitive status: Alert/Oriented     Walking or Climbing Stairs Difficulty none     Dressing/Bathing Difficulty none     Equipment Currently Used at Home none     Readmission within 30 days? Yes     Do you currently have service(s) that help you manage your care at home? No     Do you take prescription medications? Yes     Do you have prescription coverage? Yes     Do you have any problems affording any of your prescribed medications? No     Is the patient taking medications as prescribed? yes     Who is going to  help you get home at discharge? Mother     Are you on dialysis? No     Do you take coumadin? No     Discharge Plan A Home     Discharge Plan B Home Health     DME Needed Upon Discharge  none     Discharge Plan discussed with: Patient     Discharge Barriers Identified None                  Ginny Arredondo RN, CM   Ext: [phone]

## 2022-04-20 NOTE — BRIEF OP NOTE
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 Left neck dissection levels 2-4 - Permanent     References:    Click here for ordering Quick Tip   Question Answer Comment   Procedure Type: ENT    Specimen Class: Known or suspected malignancy        04/20/22 1002                [id number]

## 2022-04-20 NOTE — OP NOTE
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 Thomas is a 27 year old female with previous history of left ear lobe keloid treated with low dose radiation. Recently found to have a left parotid tumor. Final pathology with T2N1M0 mucoepidermoid carcinoma. Recommend further treatment with left lateral neck dissection. Risks, benefits, and alternatives were discussed with the patient. All questions were answered and she wishes to proceed with surgery.    Description of Procedure:  After obtaining informed consent, the patient was taken to the operating room in the supine position and general endotracheal anesthesia was induced without difficulty. The area was prepped and draped in the standard sterile fashion, and 1% lidocaine with 1:100,000 epinephrine was injected into planned cervical incision, which was placed in a naturally-occurring skin crease at the previous lower limb of the parotidectomy and extended anteriorly. Sufficient time was allowed for this to take effect. The skin was incised with a #15 blade and dissection was carried down to a sub-platysmal plane. Superior and inferior sub-platysmal flaps were raised taking care to identify and preserve the greater auricular and marginal mandibular nerves, as well as the external jugular vein. The fascia overlying the submandibular gland was sharply incised at its inferior border and elevated off of the gland in order to protect all branches of the marginal mandibular nerve. Blunt dissection was then used to identify the digastric  tendon, which was followed anteriorly over the anterior belly and posteriorly to the insertion of the posterior belly at the mastoid tip. Allis clamps were then used to grasp the fascia of the sternocleidomastoid muscle after mobilizing and preserving the greater auricular nerve and external jugular vein. A #15 blade was then used to unroll the muscle from its fascia in a broad plane until the posterior border of the muscle was visible. The accessory nerve was then identified via blunt dissection and traced proximally as it coursed over the internal jugular vein. The omohyoid muscle was divided with Bovie electrocautery and dissection proceeded to the thoracic inlet. The lateral border of the internal jugular vein was identified at this level and dissection then proceeded to the floor of the neck. The transverse cervical vessels were identified and preserved. The lymph node bearing fatty tissue was then cross-clamped at the thoracic inlet with a Dixie clamp and ligated with 2-0 silk ties to prevent chyle leak from thoracic or accessory ducts. The neck contents were then elevated sharply off of the cervical rootlets and fascia of the floor of the neck with a #15 blade towards the carotid sheath, which was entered sharply. The carotid, vagus nerve and internal jugular vein were identified and preserved, and the #15 blade was used to release the lymph node bearing tissue off of the carotid sheath contents. Tenotomy scissors were then used to perform level IIb dissection, gently retracting the accessory nerve with a nerve hook and delivering level IIb underneath it in toto with the remaining specimen. The #15 blade was then used to complete the resection, and the specimen was sent for permanent histopathology. A 15-Sami round suhail drain was then placed into the deep neck after confirming there was no active bleeding or chyle leak with a Valsalva to 30cm H2O. The wound was then closed in layers. The platysma was  reapproximated with 3-0 PDS suture and the deep dermal layer was closed with 4-0 PDS. The skin edges were apposed with interrupted 4-0 prolene sutures. 10cc of diluted 1:10 Kenalog-40 was then injected adjacent to the incision. She was then extubated and taken to the PACU.     Estimated Blood Loss: 50 cc    Complications: None         Specimens: Left neck dissection levels 2-4

## 2022-04-21 VITALS
SYSTOLIC BLOOD PRESSURE: 107 MMHG | WEIGHT: 183 LBS | HEART RATE: 67 BPM | HEIGHT: 62 IN | OXYGEN SATURATION: 98 % | DIASTOLIC BLOOD PRESSURE: 75 MMHG | TEMPERATURE: 99 F | BODY MASS INDEX: 33.68 KG/M2 | RESPIRATION RATE: 15 BRPM

## 2022-04-21 LAB
ANION GAP SERPL CALC-SCNC: 6 MMOL/L (ref 8–16)
BUN SERPL-MCNC: 9 MG/DL (ref 6–20)
CALCIUM SERPL-MCNC: 8.7 MG/DL (ref 8.7–10.5)
CHLORIDE SERPL-SCNC: 104 MMOL/L (ref 95–110)
CO2 SERPL-SCNC: 30 MMOL/L (ref 23–29)
CREAT SERPL-MCNC: 0.7 MG/DL (ref 0.5–1.4)
EST. GFR  (AFRICAN AMERICAN): >60 ML/MIN/1.73 M^2
EST. GFR  (NON AFRICAN AMERICAN): >60 ML/MIN/1.73 M^2
GLUCOSE SERPL-MCNC: 131 MG/DL (ref 70–110)
POTASSIUM SERPL-SCNC: 3.7 MMOL/L (ref 3.5–5.1)
SODIUM SERPL-SCNC: 140 MMOL/L (ref 136–145)

## 2022-04-21 PROCEDURE — 36415 COLL VENOUS BLD VENIPUNCTURE: CPT | Performed by: STUDENT IN AN ORGANIZED HEALTH CARE EDUCATION/TRAINING PROGRAM

## 2022-04-21 PROCEDURE — 25000003 PHARM REV CODE 250: Performed by: STUDENT IN AN ORGANIZED HEALTH CARE EDUCATION/TRAINING PROGRAM

## 2022-04-21 PROCEDURE — 80048 BASIC METABOLIC PNL TOTAL CA: CPT | Performed by: STUDENT IN AN ORGANIZED HEALTH CARE EDUCATION/TRAINING PROGRAM

## 2022-04-21 PROCEDURE — 94760 N-INVAS EAR/PLS OXIMETRY 1: CPT

## 2022-04-21 RX ORDER — CEPHALEXIN 500 MG/1
500 CAPSULE ORAL EVERY 8 HOURS
Qty: 21 CAPSULE | Refills: 0 | Status: SHIPPED | OUTPATIENT
Start: 2022-04-21 | End: 2022-05-18

## 2022-04-21 RX ORDER — HYDROCODONE BITARTRATE AND ACETAMINOPHEN 5; 325 MG/1; MG/1
1 TABLET ORAL EVERY 6 HOURS PRN
Qty: 30 TABLET | Refills: 0 | Status: SHIPPED | OUTPATIENT
Start: 2022-04-21 | End: 2022-05-18

## 2022-04-21 RX ORDER — ONDANSETRON 8 MG/1
8 TABLET, ORALLY DISINTEGRATING ORAL EVERY 8 HOURS PRN
Qty: 15 TABLET | Refills: 0 | Status: SHIPPED | OUTPATIENT
Start: 2022-04-21 | End: 2022-05-18

## 2022-04-21 RX ADMIN — PAROXETINE HYDROCHLORIDE 40 MG: 10 TABLET, FILM COATED ORAL at 09:04

## 2022-04-21 RX ADMIN — CEPHALEXIN 500 MG: 500 CAPSULE ORAL at 09:04

## 2022-04-21 NOTE — DISCHARGE SUMMARY
[facility]  Otorhinolaryngology-Head & Neck Surgery  Discharge Summary      Patient Name: Yoana Thomas  MRN: [id number]  Admission Date: 4/20/2022  Hospital Length of Stay: 1 days  Discharge Date and Time: No discharge date for patient encounter.  Attending Physician: Tin Barriga MD   Discharging Provider: Kaveh Robison Jr., MD  Primary Care Provider: Abbey Leyva MD    Procedure(s) (LRB):  DISSECTION, NECK (Left)      Indwelling Lines/Drains at time of discharge:   Lines/Drains/Airways     Drain  Duration                Closed/Suction Drain 03/21/22 1358 Left;Lateral Neck Bulb 10 Fr. 30 days         Closed/Suction Drain 04/20/22 1013 Left Neck Bulb 15 Fr. <1 day              Hospital Course: Observed after routine elective surgery which she tolerated well. Pain is controlled. Ambulating, voiding, tolerating PO. She will follow up in 1 week for suture/drain removal.    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    Specialty: Otolaryngology  Contact information:  [address]  [facility] [address]  [phone]                       Patient Instructions:      Lifting restrictions   Order Comments: No heavy lifting, bending, straining for 2 weeks     Notify your health care provider if you experience any of the following:  temperature >100.4      Notify your health care provider if you experience any of the following:  persistent nausea and vomiting or diarrhea     Notify your health care provider if you experience any of the following:  severe uncontrolled pain     Notify your health care provider if you experience any of the following:  redness, tenderness, or signs of infection (pain, swelling, redness, odor or green/yellow discharge around incision site)     Notify your health care provider if you experience any of the following:  difficulty breathing or increased cough     No dressing needed   Order Comments: You can shower tomorrow. Do not submerge wound underwater.     Medications:  Reconciled Home Medications:      Medication List      START taking these medications    cephALEXin 500 MG capsule  Commonly known as: KEFLEX  Take 1 capsule (500 mg total) by mouth every 8 (eight) hours.     ondansetron 8 MG Tbdl  Commonly known as: ZOFRAN-ODT  Take 1 tablet (8 mg total) by mouth every 8 (eight) hours as needed.        CHANGE how you take these medications    HYDROcodone-acetaminophen 5-325 mg per tablet  Commonly known as: NORCO  Take 1 tablet by mouth every 6 (six) hours as needed for Pain.  What changed: when to take this        CONTINUE taking these medications    paroxetine 40 MG tablet  Commonly known as: PAXIL  TAKE 1 TABLET BY MOUTH EVERY DAY IN THE MORNING          Time spent on the discharge of patient: 10 minutes    Kaveh Robison Jr., MD  Otorhinolaryngology-Head & Neck Surgery  [facility]

## 2022-04-21 NOTE — PLAN OF CARE
Patient discharged to home with follow up apt set up. Discharge instructions verbally given and hard copy provided to patient. Prescriptions delivered bedside. Removed IV with cath tip in place. Patient tolerated IV removal well with bleeding controlled. Patient remains free of falls with no acute pain or distress noted. Patient left floor with CHAYA drain. Provided cups to measure and sheet to record.

## 2022-04-21 NOTE — PLAN OF CARE
[facility]  Discharge Final Note    Primary Care Provider: Abbey Leyva MD    Expected Discharge Date: 4/21/2022    Final Discharge Note (most recent)     Final Note - 04/21/22 1131        Final Note    Assessment Type Final Discharge Note     Anticipated Discharge Disposition Home or Self Care     Hospital Resources/Appts/Education Provided Appointments scheduled and added to AVS        Post-Acute Status    Post-Acute Authorization Other     Other Status No Post-Acute Service Needs               Contact Info     Mago Macias NP   Specialty: Otolaryngology    [address] [facility] [address]   Phone: [phone]       Next Steps: Follow up on 4/27/2022    Instructions: at 1:30PM    Abbey Leyva MD   Specialty: Internal Medicine   Relationship: PCP - General    [address]  [facility] [address]   Phone: [phone]       Next Steps: Follow up on 5/2/2022    Instructions: F/U appointment 10:30 AM        Ginny Arredondo RN, CM   Ext: [phone]

## 2022-04-21 NOTE — PLAN OF CARE
POC reviewed with patient and mother. AAOx4, able to ambulate to the BR. VSS with no complaints of SOB, headaches, or dizziness. Urine output as unmeasured occurrences to BR. Regular diet, no intake during evening shift. No BM and no complaints of N/V. Left neck sutures healing, Left neck CHAYA-drain 30 mL sanguinous output. Denies pain, controlled with ordered medications. Patient resting with side rails up and call light with in reach. Continuing to monitor patient status.

## 2022-04-27 ENCOUNTER — OFFICE VISIT (OUTPATIENT)
Dept: OTOLARYNGOLOGY | Facility: CLINIC | Age: 27
End: 2022-04-27
Payer: COMMERCIAL

## 2022-04-27 VITALS
WEIGHT: 186.94 LBS | TEMPERATURE: 98 F | DIASTOLIC BLOOD PRESSURE: 80 MMHG | BODY MASS INDEX: 34.19 KG/M2 | HEART RATE: 71 BPM | SYSTOLIC BLOOD PRESSURE: 123 MMHG

## 2022-04-27 DIAGNOSIS — C08.9 MUCOEPIDERMOID CARCINOMA OF SALIVARY GLAND: Primary | ICD-10-CM

## 2022-04-27 PROCEDURE — 3074F PR MOST RECENT SYSTOLIC BLOOD PRESSURE < 130 MM HG: ICD-10-PCS | Mod: CPTII,S$GLB,, | Performed by: NURSE PRACTITIONER

## 2022-04-27 PROCEDURE — 1159F PR MEDICATION LIST DOCUMENTED IN MEDICAL RECORD: ICD-10-PCS | Mod: CPTII,S$GLB,, | Performed by: NURSE PRACTITIONER

## 2022-04-27 PROCEDURE — 3079F DIAST BP 80-89 MM HG: CPT | Mod: CPTII,S$GLB,, | Performed by: NURSE PRACTITIONER

## 2022-04-27 PROCEDURE — 3079F PR MOST RECENT DIASTOLIC BLOOD PRESSURE 80-89 MM HG: ICD-10-PCS | Mod: CPTII,S$GLB,, | Performed by: NURSE PRACTITIONER

## 2022-04-27 PROCEDURE — 99999 PR PBB SHADOW E&M-EST. PATIENT-LVL III: CPT | Mod: PBBFAC,,, | Performed by: NURSE PRACTITIONER

## 2022-04-27 PROCEDURE — 1159F MED LIST DOCD IN RCRD: CPT | Mod: CPTII,S$GLB,, | Performed by: NURSE PRACTITIONER

## 2022-04-27 PROCEDURE — 99024 PR POST-OP FOLLOW-UP VISIT: ICD-10-PCS | Mod: S$GLB,,, | Performed by: NURSE PRACTITIONER

## 2022-04-27 PROCEDURE — 99024 POSTOP FOLLOW-UP VISIT: CPT | Mod: S$GLB,,, | Performed by: NURSE PRACTITIONER

## 2022-04-27 PROCEDURE — 3074F SYST BP LT 130 MM HG: CPT | Mod: CPTII,S$GLB,, | Performed by: NURSE PRACTITIONER

## 2022-04-27 PROCEDURE — 3008F PR BODY MASS INDEX (BMI) DOCUMENTED: ICD-10-PCS | Mod: CPTII,S$GLB,, | Performed by: NURSE PRACTITIONER

## 2022-04-27 PROCEDURE — 99999 PR PBB SHADOW E&M-EST. PATIENT-LVL III: ICD-10-PCS | Mod: PBBFAC,,, | Performed by: NURSE PRACTITIONER

## 2022-04-27 PROCEDURE — 3008F BODY MASS INDEX DOCD: CPT | Mod: CPTII,S$GLB,, | Performed by: NURSE PRACTITIONER

## 2022-04-27 NOTE — ASSESSMENT & PLAN NOTE
Doing well sutures removed. Steroid injection done per Dr. Hall. Questions answered. RTC in 2 weeks, sooner if needed.

## 2022-04-27 NOTE — PROGRESS NOTES
Subjective:       Patient ID: Yoana Thomas is a 27 y.o. female.    Oncology History   Mucoepidermoid carcinoma of salivary gland   3/21/2022 Surgery    EXCISION, PAROTID GLAND-nerve monitoring (Left)  CREATION, FLAP, MUSCLE ROTATION-SCM (Left)     4/4/2022 Initial Diagnosis    Mucoepidermoid carcinoma of salivary gland     4/4/2022 Cancer Staged    Staging form: Major Salivary Glands, AJCC 8th Edition  - Pathologic stage from 4/4/2022: Stage III (pT2, pN1, cM0)         Chief Complaint: post op  HPI     Yoana Thomas returns for a post op visit. She has been doing well since surgery. No complaints.    Past Medical History:   Diagnosis Date    Anxiety     Attention deficit hyperactivity disorder (ADHD), predominantly inattentive type 6/14/2021       Past Surgical History:   Procedure Laterality Date    CREATION OF MUSCLE ROTATIONAL FLAP Left 3/21/2022    Procedure: CREATION, FLAP, MUSCLE ROTATION-SCM;  Surgeon: Edel Hall MD;  Location: [facility] OR [address] [place] [address];  Service: ENT;  Laterality: Left;    DISSECTION OF NECK Left 4/20/2022    Procedure: DISSECTION, NECK;  Surgeon: Edel Hall MD;  [facility];  Service: ENT;  Laterality: Left;    EXCISION OF PAROTID GLAND Left 3/21/2022    Procedure: EXCISION, PAROTID GLAND-nerve monitoring;  Surgeon: Edel Hall MD;  Location: [facility] OR [address] [place] [address];  Service: ENT;  Laterality: Left;    KELOID EXCISION      Left ear and she also had radiation Tx    WISDOM TOOTH EXTRACTION           Current Outpatient Medications:     cephALEXin (KEFLEX) 500 MG capsule, Take 1 capsule (500 mg total) by mouth every 8 (eight) hours., Disp: 21 capsule, Rfl: 0    HYDROcodone-acetaminophen (NORCO) 5-325 mg per tablet, Take 1 tablet by mouth every 6 (six) hours as needed for Pain., Disp: 30 tablet, Rfl: 0    ondansetron (ZOFRAN-ODT) 8 MG TbDL, Dissolve 1 tablet (8 mg total) by mouth every 8 (eight) hours as needed., Disp: 15 tablet, Rfl: 0     paroxetine (PAXIL) 40 MG tablet, TAKE 1 TABLET BY MOUTH EVERY DAY IN THE MORNING, Disp: 90 tablet, Rfl: 3    Review of patient's allergies indicates:  No Known Allergies    Social History     Socioeconomic History    Marital status: Single   Tobacco Use    Smoking status: Never Smoker    Smokeless tobacco: Never Used   Substance and Sexual Activity    Alcohol use: Yes     Comment: once every couple mos.     Drug use: Never    Sexual activity: Not Currently     Partners: Male     Birth control/protection: Condom       Family History   Problem Relation Age of Onset    Skin cancer Father     Hypertension Father     Infertility Sister     Diabetes type I Maternal Grandmother     Heart disease Maternal Grandmother     Kidney failure Maternal Grandmother         ESRD    Diabetes type II Maternal Grandfather     Lung cancer Paternal Grandfather        Review of Systems   Constitutional: Negative.    HENT: Negative.    Eyes: Negative.    Respiratory: Negative.    Cardiovascular: Negative.    Gastrointestinal: Negative.    Endocrine: Negative.    Genitourinary: Negative.    Musculoskeletal: Negative.    Integumentary:  Negative.   Allergic/Immunologic: Negative.    Neurological: Negative.    Hematological: Negative.    Psychiatric/Behavioral: Negative.          Objective:      Physical Exam  Constitutional:       Appearance: Normal appearance.   HENT:      Head: Normocephalic and atraumatic.      Right Ear: External ear normal.      Left Ear: External ear normal.   Neck:      Comments: Incision CDI.  No redness, drainage, or fluid collection noted.  Edges well approximated. Sutures and drain removed. 5ml of Kenalog 10 injected into incision by Dr. Hall with 27g needle.  Pulmonary:      Effort: Pulmonary effort is normal. No respiratory distress.   Neurological:      General: No focal deficit present.      Mental Status: She is alert.   Psychiatric:         Mood and Affect: Mood normal.         Behavior:  Behavior normal.         Thought Content: Thought content normal.         Assessment:       Problem List Items Addressed This Visit        Oncology    Mucoepidermoid carcinoma of salivary gland - Primary     Doing well sutures removed. Steroid injection done per Dr. Hall. Questions answered. RTC in 2 weeks, sooner if needed.                 Plan:       Problem List Items Addressed This Visit        Oncology    Mucoepidermoid carcinoma of salivary gland - Primary     Doing well sutures removed. Steroid injection done per Dr. Hall. Questions answered. RTC in 2 weeks, sooner if needed.

## 2022-04-29 ENCOUNTER — PATIENT MESSAGE (OUTPATIENT)
Dept: OTOLARYNGOLOGY | Facility: CLINIC | Age: 27
End: 2022-04-29
Payer: COMMERCIAL

## 2022-04-29 LAB
FINAL PATHOLOGIC DIAGNOSIS: NORMAL
GROSS: NORMAL
Lab: NORMAL
MICROSCOPIC EXAM: NORMAL

## 2022-05-02 DIAGNOSIS — F41.9 ANXIETY: ICD-10-CM

## 2022-05-02 RX ORDER — PAROXETINE HYDROCHLORIDE 40 MG/1
TABLET, FILM COATED ORAL
Qty: 90 TABLET | Refills: 1 | Status: SHIPPED | OUTPATIENT
Start: 2022-05-02 | End: 2022-10-19 | Stop reason: SDUPTHER

## 2022-05-02 NOTE — TELEPHONE ENCOUNTER
No new care gaps identified.  Powered by [employer] by [employer]. Reference number: [id number].   5/02/2022 3:57:36 AM CDT

## 2022-05-02 NOTE — TELEPHONE ENCOUNTER
Refill Routing Note   Medication(s) are not appropriate for processing by [facility] for the following reason(s):      - Patient has been seen in the ED/Hospital since the last PCP visit    [facility] action(s):  Route          Medication reconciliation completed: No     Appointments  past 12m or future 3m with PCP    Date Provider   Last Visit   6/14/2021 Abbey Leyva MD   Next Visit   Visit date not found Abbey Leyva MD   ED visits in past 90 days: 0        Note composed:1:20 PM 05/02/2022

## 2022-05-10 ENCOUNTER — CLINICAL SUPPORT (OUTPATIENT)
Dept: OTHER | Facility: CLINIC | Age: 27
End: 2022-05-10
Payer: COMMERCIAL

## 2022-05-10 DIAGNOSIS — Z00.8 ENCOUNTER FOR OTHER GENERAL EXAMINATION: ICD-10-CM

## 2022-05-11 ENCOUNTER — OFFICE VISIT (OUTPATIENT)
Dept: OTOLARYNGOLOGY | Facility: CLINIC | Age: 27
End: 2022-05-11
Payer: COMMERCIAL

## 2022-05-11 VITALS
SYSTOLIC BLOOD PRESSURE: 113 MMHG | TEMPERATURE: 98 F | HEART RATE: 88 BPM | DIASTOLIC BLOOD PRESSURE: 77 MMHG | BODY MASS INDEX: 34.72 KG/M2 | WEIGHT: 189.81 LBS

## 2022-05-11 DIAGNOSIS — Z87.2 HISTORY OF KELOID OF SKIN: ICD-10-CM

## 2022-05-11 DIAGNOSIS — C08.9 MUCOEPIDERMOID CARCINOMA OF SALIVARY GLAND: Primary | ICD-10-CM

## 2022-05-11 PROCEDURE — 99999 PR PBB SHADOW E&M-EST. PATIENT-LVL III: ICD-10-PCS | Mod: PBBFAC,,, | Performed by: OTOLARYNGOLOGY

## 2022-05-11 PROCEDURE — 99999 PR PBB SHADOW E&M-EST. PATIENT-LVL III: CPT | Mod: PBBFAC,,, | Performed by: OTOLARYNGOLOGY

## 2022-05-11 PROCEDURE — 1159F PR MEDICATION LIST DOCUMENTED IN MEDICAL RECORD: ICD-10-PCS | Mod: CPTII,S$GLB,, | Performed by: OTOLARYNGOLOGY

## 2022-05-11 PROCEDURE — 3074F PR MOST RECENT SYSTOLIC BLOOD PRESSURE < 130 MM HG: ICD-10-PCS | Mod: CPTII,S$GLB,, | Performed by: OTOLARYNGOLOGY

## 2022-05-11 PROCEDURE — 99024 PR POST-OP FOLLOW-UP VISIT: ICD-10-PCS | Mod: S$GLB,,, | Performed by: OTOLARYNGOLOGY

## 2022-05-11 PROCEDURE — 99024 POSTOP FOLLOW-UP VISIT: CPT | Mod: S$GLB,,, | Performed by: OTOLARYNGOLOGY

## 2022-05-11 PROCEDURE — 3008F BODY MASS INDEX DOCD: CPT | Mod: CPTII,S$GLB,, | Performed by: OTOLARYNGOLOGY

## 2022-05-11 PROCEDURE — 3008F PR BODY MASS INDEX (BMI) DOCUMENTED: ICD-10-PCS | Mod: CPTII,S$GLB,, | Performed by: OTOLARYNGOLOGY

## 2022-05-11 PROCEDURE — 1159F MED LIST DOCD IN RCRD: CPT | Mod: CPTII,S$GLB,, | Performed by: OTOLARYNGOLOGY

## 2022-05-11 PROCEDURE — 3074F SYST BP LT 130 MM HG: CPT | Mod: CPTII,S$GLB,, | Performed by: OTOLARYNGOLOGY

## 2022-05-11 PROCEDURE — 3078F DIAST BP <80 MM HG: CPT | Mod: CPTII,S$GLB,, | Performed by: OTOLARYNGOLOGY

## 2022-05-11 PROCEDURE — 3078F PR MOST RECENT DIASTOLIC BLOOD PRESSURE < 80 MM HG: ICD-10-PCS | Mod: CPTII,S$GLB,, | Performed by: OTOLARYNGOLOGY

## 2022-05-11 NOTE — PROGRESS NOTES
Subjective:       Patient ID: Yoana Thmoas is a 27 y.o. female.    Oncology History   Mucoepidermoid carcinoma of salivary gland   3/21/2022 Surgery    EXCISION, PAROTID GLAND-nerve monitoring (Left)  CREATION, FLAP, MUSCLE ROTATION-SCM (Left)     4/4/2022 Initial Diagnosis    Mucoepidermoid carcinoma of salivary gland     4/4/2022 Cancer Staged    Staging form: Major Salivary Glands, AJCC 8th Edition  - Pathologic stage from 4/4/2022: Stage III (pT2, pN1, cM0)         Chief Complaint: post op  HPI     Yoana Thomas returns for a post op visit and repeat Kenalog injection. She has been doing well since surgery. No complaints. Using silicone strips on her scar.    Past Medical History:   Diagnosis Date    Anxiety     Attention deficit hyperactivity disorder (ADHD), predominantly inattentive type 6/14/2021       Past Surgical History:   Procedure Laterality Date    CREATION OF MUSCLE ROTATIONAL FLAP Left 3/21/2022    Procedure: CREATION, FLAP, MUSCLE ROTATION-SCM;  Surgeon: Edel Hall MD;  Location: [facility] OR [address] [place] [address];  Service: ENT;  Laterality: Left;    DISSECTION OF NECK Left 4/20/2022    Procedure: DISSECTION, NECK;  Surgeon: Edel Hall MD;  Location: [facility] OR [address] [place] [address];  Service: ENT;  Laterality: Left;    EXCISION OF PAROTID GLAND Left 3/21/2022    Procedure: EXCISION, PAROTID GLAND-nerve monitoring;  Surgeon: Edel Hall MD;  Location: [facility] OR [address] [place] [address];  Service: ENT;  Laterality: Left;    KELOID EXCISION      Left ear and she also had radiation Tx    WISDOM TOOTH EXTRACTION           Current Outpatient Medications:     cephALEXin (KEFLEX) 500 MG capsule, Take 1 capsule (500 mg total) by mouth every 8 (eight) hours., Disp: 21 capsule, Rfl: 0    HYDROcodone-acetaminophen (NORCO) 5-325 mg per tablet, Take 1 tablet by mouth every 6 (six) hours as needed for Pain., Disp: 30 tablet, Rfl: 0    ondansetron (ZOFRAN-ODT) 8 MG TbDL, Dissolve 1 tablet (8 mg total) by mouth  every 8 (eight) hours as needed., Disp: 15 tablet, Rfl: 0    paroxetine (PAXIL) 40 MG tablet, TAKE 1 TABLET BY MOUTH EVERY DAY IN THE MORNING, Disp: 90 tablet, Rfl: 1    Review of patient's allergies indicates:  No Known Allergies    Social History     Socioeconomic History    Marital status: Single   Tobacco Use    Smoking status: Never Smoker    Smokeless tobacco: Never Used   Substance and Sexual Activity    Alcohol use: Yes     Comment: once every couple mos.     Drug use: Never    Sexual activity: Not Currently     Partners: Male     Birth control/protection: Condom       Family History   Problem Relation Age of Onset    Skin cancer Father     Hypertension Father     Infertility Sister     Diabetes type I Maternal Grandmother     Heart disease Maternal Grandmother     Kidney failure Maternal Grandmother         ESRD    Diabetes type II Maternal Grandfather     Lung cancer Paternal Grandfather        Review of Systems   Constitutional: Negative.    HENT: Negative.    Eyes: Negative.    Respiratory: Negative.    Cardiovascular: Negative.    Gastrointestinal: Negative.    Endocrine: Negative.    Genitourinary: Negative.    Musculoskeletal: Negative.    Integumentary:  Negative.   Allergic/Immunologic: Negative.    Neurological: Negative.    Hematological: Negative.    Psychiatric/Behavioral: Negative.          Objective:      Physical Exam  Constitutional:       Appearance: Normal appearance.   HENT:      Head: Normocephalic and atraumatic.      Right Ear: External ear normal.      Left Ear: External ear normal.   Neck:      Comments: Incision CDI.  No redness, drainage, or fluid collection noted.  Edges well approximated. Sutures and drain removed. 5ml of Kenalog 40 injected into incision with 27g needle.  Pulmonary:      Effort: Pulmonary effort is normal. No respiratory distress.   Neurological:      General: No focal deficit present.      Mental Status: She is alert.   Psychiatric:         Mood  and Affect: Mood normal.         Behavior: Behavior normal.         Thought Content: Thought content normal.         Assessment:       Problem List Items Addressed This Visit        Oncology    Mucoepidermoid carcinoma of salivary gland - Primary      Other Visit Diagnoses     History of keloid of skin              Plan:       Problem List Items Addressed This Visit        Oncology    Mucoepidermoid carcinoma of salivary gland - Primary      Other Visit Diagnoses     History of keloid of skin            T2N1M0 mucoepidermoid carcinoma, intermediate grade, of the left parotid. Doing well, repeat injection performed in clinic today for keloid prevention. Neck dissection without further evidence of disease. Will discuss case at tumor board later this week. I will contact her with further recommendations. All questions were answered and she is in agreement with the plan.

## 2022-05-12 VITALS
HEIGHT: 62 IN | DIASTOLIC BLOOD PRESSURE: 86 MMHG | WEIGHT: 185 LBS | SYSTOLIC BLOOD PRESSURE: 133 MMHG | BODY MASS INDEX: 34.04 KG/M2

## 2022-05-12 LAB
GLUCOSE SERPL-MCNC: 112 MG/DL (ref 60–140)
HDLC SERPL-MCNC: 76 MG/DL
POC CHOLESTEROL, LDL (DOCK): 133.21 MG/DL
POC CHOLESTEROL, TOTAL: 219 MG/DL
TRIGL SERPL-MCNC: 56 MG/DL

## 2022-05-13 ENCOUNTER — TELEPHONE (OUTPATIENT)
Dept: HEMATOLOGY/ONCOLOGY | Facility: CLINIC | Age: 27
End: 2022-05-13
Payer: COMMERCIAL

## 2022-05-13 ENCOUNTER — TUMOR BOARD CONFERENCE (OUTPATIENT)
Dept: OTOLARYNGOLOGY | Facility: CLINIC | Age: 27
End: 2022-05-13
Payer: COMMERCIAL

## 2022-05-13 DIAGNOSIS — C08.9 MUCOEPIDERMOID CARCINOMA OF SALIVARY GLAND: Primary | ICD-10-CM

## 2022-05-13 NOTE — TELEPHONE ENCOUNTER
Called & spoke with pt, arranged radiation oncology appt for Wednesday morning. She is aware & agreeable.

## 2022-05-13 NOTE — TELEPHONE ENCOUNTER
----- Message from Kristel Cortes RN sent at 5/13/2022 10:10 AM CDT -----  Regarding: FW: Rad onc appt  Referral for Rad Onc placed  ----- Message -----  From: Edel Hall MD  Sent: 5/13/2022   9:59 AM CDT  To: Kristel Cortes RN, [facility] Rubia Reyes RN  Subject: Rad onc appt                                     Order in, needs scheduling.    Thanks,  Edel

## 2022-05-13 NOTE — PROGRESS NOTES
Presenting Hospital / Clinic: [facility]  Virtual Tumor Board Conference: Virtual  Presenter: Dr. Barriga  Date Presented to Tumor Board: 5/12/2022  Specialties Present: Medical Oncology; Radiation Oncology; Radiology; Navigation; Head and Neck; Psychology; Speech Pathology  Presentation at Cancer Conference: Prospective  Cancer Type: Head and neck cancer  Recommended Plan Note: adjuvant radiation

## 2022-05-17 NOTE — PROGRESS NOTES
[facility] Consult Note    Referring provider: Edel Hall MD    Diagnosis:  Yoana Thomas is a 27 y.o. female with a T2N1M0, group stage III, mucoepidermoid carcinoma of the left parotid s/p left parotid excision (3/21/22) and left neck dissection (4/20/22).    Oncologic History:  She has a history of a keloid of the left earlobe treated with RT in approximately 2016 in [place] ([facility] She presented with a left neck mass.  · 2/16/22: CT neck with a 2.1cm mixed sold and cystic mass in the left superficial parotid with an additional enlarged 9 mm parotid node and prominent left neck adenopathy  · 2/16/22: FNA in clinic with atypical cells  · 3/21/22: left parotid gland excision with muscle rotation flap.  · Path: 2.2 cm intermediate grade mucoepidermoid carcinoma with no extraparenchymal extension. - SM at 1mm, -PNI, +LVSI. 1/4 LN + for metastatic carcinoma (largest deposit 2mm), no CHARLES  · 4/8/22: PET/CT with no FDG avid tumor. Mild uptake in the left parotid gland possibly from prior surgery. No FDG avid lymphadenopathy.  · 4/20/22: left neck level II-IV dissection, with 0-29 LN+ for carcinoma     History of Present Illness:  Yoana Thomas presents today to discuss adjuvant radiation therapy.    She is a never smoker, no hx of alcohol abuse. Doing well post operatively with no complaints. She had no facial numbness or weakness prior to surgery, no dermal invasion of the tumor. Still has some slight weakness from surgery, with difficulty eye closure, but this is improving. Denies otalgia, weight loss, dysphagia, voice changes. Family lives in [place]. No significant fam hx of cancer. Does not have dental insurance yet.     Review of Systems:  Review of Systems   Constitutional: Negative for malaise/fatigue and weight loss.   HENT: Negative for ear pain, hearing loss and sore throat.    Respiratory: Negative for hemoptysis and shortness of breath.    Cardiovascular:  Negative for chest pain.   Skin: Negative for rash.   Neurological: Negative for dizziness and headaches.   Psychiatric/Behavioral: The patient is nervous/anxious.        Social History:  Social History     Tobacco Use    Smoking status: Never Smoker    Smokeless tobacco: Never Used   Substance Use Topics    Alcohol use: Yes     Comment: once every couple mos.     Drug use: Never       Past Medical History:  Past Medical History:   Diagnosis Date    Anxiety     Attention deficit hyperactivity disorder (ADHD), predominantly inattentive type 6/14/2021       Past Surgical History:   Procedure Laterality Date    CREATION OF MUSCLE ROTATIONAL FLAP Left 3/21/2022    Procedure: CREATION, FLAP, MUSCLE ROTATION-SCM;  Surgeon: Edel Hall MD;  Location: [facility] OR [address] [place] [address];  Service: ENT;  Laterality: Left;    DISSECTION OF NECK Left 4/20/2022    Procedure: DISSECTION, NECK;  Surgeon: Edel Hall MD;  Location: [facility] OR [address] [place] [address];  Service: ENT;  Laterality: Left;    EXCISION OF PAROTID GLAND Left 3/21/2022    Procedure: EXCISION, PAROTID GLAND-nerve monitoring;  Surgeon: Edel Hall MD;  Location: [facility] OR [address] [place] [address];  Service: ENT;  Laterality: Left;    KELOID EXCISION      Left ear and she also had radiation Tx    WISDOM TOOTH EXTRACTION         Cancer-related family history includes Lung cancer in her paternal grandfather; Skin cancer in her father.   Sister has an auto-immuno disease, unknown  Hashimoto's runs in the family.     Medications:  Current Outpatient Medications on File Prior to Visit   Medication Sig Dispense Refill    cephALEXin (KEFLEX) 500 MG capsule Take 1 capsule (500 mg total) by mouth every 8 (eight) hours. 21 capsule 0    HYDROcodone-acetaminophen (NORCO) 5-325 mg per tablet Take 1 tablet by mouth every 6 (six) hours as needed for Pain. 30 tablet 0    ondansetron (ZOFRAN-ODT) 8 MG TbDL Dissolve 1 tablet (8 mg total) by mouth every 8 (eight) hours as needed. 15 tablet 0  "   paroxetine (PAXIL) 40 MG tablet TAKE 1 TABLET BY MOUTH EVERY DAY IN THE MORNING 90 tablet 1     No current facility-administered medications on file prior to visit.       Allergies:  Review of patient's allergies indicates:  No Known Allergies    Exam:  Vitals:    05/18/22 0917   BP: 122/82   BP Location: Right arm   Patient Position: Sitting   Pulse: 85   Resp: 18   Temp: 97.4 °F (36.3 °C)   SpO2: 99%   Weight: 87.3 kg (192 lb 6.4 oz)   Height: 5' 2" (1.575 m)     Constitutional: Pleasant 27 y.o. female in no acute distress.  Well nourished. Well groomed.   HEENT: well healing incision in the left neck. No appreciated mucosal lesions in the OC or OPX on direct exam. Good dentition. Has areas of mucosal irritation on the bilateral buccal mucosa (pt reports biting this area as a nervous habit). Slight facial weakness but able to fully close the eye. House Brackmann II. Sensation intact to light touch in V-3  Cardiovascular: Upper extremities warm to touch  Lungs: No audible wheezing.  Normal effort.   Musculoskeletal: No gross MSK deformities.  Skin: No rashes appreciated.  Psych: Alert and oriented with appropriate mood and affect.  Neuro: Grossly normal. H&N as above    Data Review:    Independent Interpretation of Test(s): CT neck from 3/4/22 was personally reviewed as above    Patient discussed at Tumor Board on 5/13/22, with recommendation for adjuvant RT      Assessment:  · T2N1M0, group stage III, mucoepidermoid carcinoma of the left parotid s/p left parotid excision (3/21/22) and left neck dissection (4/20/22).   ECOG: (0) Fully active, able to carry on all predisease performance without restriction    Possibility of pregnancy: Yes  History of prior irradiation: Yes - L earlobe ~2015/16 (no mask was used, immobilized with tape)  History of prior systemic anti-cancer therapy: No  History of collagen vascular disease: No  Implanted electronic device (pacer/defib/nerve stimulator): No    Dental needs to see " ASAP  Speech will refer  Nutrition will refer  Audiology     Plan:   Need OSH records - she does not know the hospital name but will find out.   Needs UPT    Treatment options were discussed, including adjuvant RT, systemic therapy, and close surveillance. Given intermediate grade, close margins, LN metastases and LVI, I agree with TB recommendation for adjuvant RT.    After this discussion, she was in agreement to proceed with adjuvant RT  · Given location of primary, in direct field of her prior keyloid RT, concern for RT induced malignancy. Her family lives in [place]. I recommended that she meet [facility] MD [facility]borne ASAP to discuss possibility of proton therapy. She has shown a tendency for RT induced malignancy, and she may benefit from proton therapy to reduce the volume of normal tissue in the low and intermediate dose field.   · She will reach out to me today to regarding dental clearance and her wishes with respect to MD Osborne. She is already 4 weeks post neck dissection, so if to see MDA needs to do ASAP      HEAD & NECK INFORMED CONSENT: Acute and delayed side effects of head and neck radiation, including but not limited to fatigue, redness of the skin, desquamation, dysphagia, odynophagia, mucositis, long term difficulties with swallowing, feeding tube dependency, fibrosis, xerostomia, hypothyroidism, infection as well as rare but catastrophic injury to the spinal cord, mandible, brainstem, and carotid arteries were discussed with the patient in great detail today.    I reviewed the rationale and goal of the proposed treatment course. The radiotherapeutic procedure with associated side effects and potential complications were explained. She had the opportunity to ask questions which were answered to the best of my knowledge. The patient voiced understanding of the above and has elected to proceed with treatment. Consent form was signed and the patient was given our contact information should further  questions arise.       Radiation Treatment Details:    If she elects for treatment here, I will plan to treat the parotid bed to 60-66 Gy and left neck to a dose of 54-60 Gy in 30-33 fractions, with IMRT using SIB technique.       Ricky Blanco MD  Radiation Oncology

## 2022-05-18 ENCOUNTER — TELEPHONE (OUTPATIENT)
Dept: RADIATION ONCOLOGY | Facility: CLINIC | Age: 27
End: 2022-05-18
Payer: COMMERCIAL

## 2022-05-18 ENCOUNTER — PATIENT MESSAGE (OUTPATIENT)
Dept: RADIATION ONCOLOGY | Facility: CLINIC | Age: 27
End: 2022-05-18

## 2022-05-18 ENCOUNTER — OFFICE VISIT (OUTPATIENT)
Dept: RADIATION ONCOLOGY | Facility: CLINIC | Age: 27
End: 2022-05-18
Payer: COMMERCIAL

## 2022-05-18 ENCOUNTER — TELEPHONE (OUTPATIENT)
Dept: SPEECH THERAPY | Facility: HOSPITAL | Age: 27
End: 2022-05-18
Payer: COMMERCIAL

## 2022-05-18 VITALS
HEIGHT: 62 IN | WEIGHT: 192.38 LBS | HEART RATE: 85 BPM | TEMPERATURE: 97 F | RESPIRATION RATE: 18 BRPM | DIASTOLIC BLOOD PRESSURE: 82 MMHG | OXYGEN SATURATION: 99 % | SYSTOLIC BLOOD PRESSURE: 122 MMHG | BODY MASS INDEX: 35.4 KG/M2

## 2022-05-18 DIAGNOSIS — C08.9 MUCOEPIDERMOID CARCINOMA OF SALIVARY GLAND: ICD-10-CM

## 2022-05-18 PROCEDURE — 3079F PR MOST RECENT DIASTOLIC BLOOD PRESSURE 80-89 MM HG: ICD-10-PCS | Mod: CPTII,S$GLB,, | Performed by: STUDENT IN AN ORGANIZED HEALTH CARE EDUCATION/TRAINING PROGRAM

## 2022-05-18 PROCEDURE — 1159F MED LIST DOCD IN RCRD: CPT | Mod: CPTII,S$GLB,, | Performed by: STUDENT IN AN ORGANIZED HEALTH CARE EDUCATION/TRAINING PROGRAM

## 2022-05-18 PROCEDURE — 3008F BODY MASS INDEX DOCD: CPT | Mod: CPTII,S$GLB,, | Performed by: STUDENT IN AN ORGANIZED HEALTH CARE EDUCATION/TRAINING PROGRAM

## 2022-05-18 PROCEDURE — 1159F PR MEDICATION LIST DOCUMENTED IN MEDICAL RECORD: ICD-10-PCS | Mod: CPTII,S$GLB,, | Performed by: STUDENT IN AN ORGANIZED HEALTH CARE EDUCATION/TRAINING PROGRAM

## 2022-05-18 PROCEDURE — 1111F PR DISCHARGE MEDS RECONCILED W/ CURRENT OUTPATIENT MED LIST: ICD-10-PCS | Mod: CPTII,S$GLB,, | Performed by: STUDENT IN AN ORGANIZED HEALTH CARE EDUCATION/TRAINING PROGRAM

## 2022-05-18 PROCEDURE — 99205 OFFICE O/P NEW HI 60 MIN: CPT | Mod: S$GLB,,, | Performed by: STUDENT IN AN ORGANIZED HEALTH CARE EDUCATION/TRAINING PROGRAM

## 2022-05-18 PROCEDURE — 3008F PR BODY MASS INDEX (BMI) DOCUMENTED: ICD-10-PCS | Mod: CPTII,S$GLB,, | Performed by: STUDENT IN AN ORGANIZED HEALTH CARE EDUCATION/TRAINING PROGRAM

## 2022-05-18 PROCEDURE — 99205 PR OFFICE/OUTPT VISIT, NEW, LEVL V, 60-74 MIN: ICD-10-PCS | Mod: S$GLB,,, | Performed by: STUDENT IN AN ORGANIZED HEALTH CARE EDUCATION/TRAINING PROGRAM

## 2022-05-18 PROCEDURE — 3074F PR MOST RECENT SYSTOLIC BLOOD PRESSURE < 130 MM HG: ICD-10-PCS | Mod: CPTII,S$GLB,, | Performed by: STUDENT IN AN ORGANIZED HEALTH CARE EDUCATION/TRAINING PROGRAM

## 2022-05-18 PROCEDURE — 3074F SYST BP LT 130 MM HG: CPT | Mod: CPTII,S$GLB,, | Performed by: STUDENT IN AN ORGANIZED HEALTH CARE EDUCATION/TRAINING PROGRAM

## 2022-05-18 PROCEDURE — 99999 PR PBB SHADOW E&M-EST. PATIENT-LVL V: ICD-10-PCS | Mod: PBBFAC,,, | Performed by: STUDENT IN AN ORGANIZED HEALTH CARE EDUCATION/TRAINING PROGRAM

## 2022-05-18 PROCEDURE — 99999 PR PBB SHADOW E&M-EST. PATIENT-LVL V: CPT | Mod: PBBFAC,,, | Performed by: STUDENT IN AN ORGANIZED HEALTH CARE EDUCATION/TRAINING PROGRAM

## 2022-05-18 PROCEDURE — 1111F DSCHRG MED/CURRENT MED MERGE: CPT | Mod: CPTII,S$GLB,, | Performed by: STUDENT IN AN ORGANIZED HEALTH CARE EDUCATION/TRAINING PROGRAM

## 2022-05-18 PROCEDURE — 3079F DIAST BP 80-89 MM HG: CPT | Mod: CPTII,S$GLB,, | Performed by: STUDENT IN AN ORGANIZED HEALTH CARE EDUCATION/TRAINING PROGRAM

## 2022-05-18 NOTE — TELEPHONE ENCOUNTER
----- Message from Alma Perry sent at 5/18/2022 11:06 AM CDT -----  Regarding: pt  Pt is returning the nurses phone call can you please call pt at [phone].    JILLIAN

## 2022-05-18 NOTE — TELEPHONE ENCOUNTER
Call to pt. States it was the speech therapist she was trying to reach and had spoken with them already.

## 2022-05-19 ENCOUNTER — PATIENT MESSAGE (OUTPATIENT)
Dept: RADIATION ONCOLOGY | Facility: CLINIC | Age: 27
End: 2022-05-19
Payer: COMMERCIAL

## 2022-05-23 ENCOUNTER — CLINICAL SUPPORT (OUTPATIENT)
Dept: SPEECH THERAPY | Facility: HOSPITAL | Age: 27
End: 2022-05-23
Attending: STUDENT IN AN ORGANIZED HEALTH CARE EDUCATION/TRAINING PROGRAM
Payer: COMMERCIAL

## 2022-05-23 ENCOUNTER — PATIENT MESSAGE (OUTPATIENT)
Dept: SPEECH THERAPY | Facility: HOSPITAL | Age: 27
End: 2022-05-23

## 2022-05-23 DIAGNOSIS — C08.9 MUCOEPIDERMOID CARCINOMA OF SALIVARY GLAND: ICD-10-CM

## 2022-05-23 DIAGNOSIS — R13.12 DYSPHAGIA, OROPHARYNGEAL: ICD-10-CM

## 2022-05-23 PROCEDURE — 92610 EVALUATE SWALLOWING FUNCTION: CPT | Mod: GN

## 2022-05-25 ENCOUNTER — PATIENT MESSAGE (OUTPATIENT)
Dept: RADIATION ONCOLOGY | Facility: CLINIC | Age: 27
End: 2022-05-25
Payer: COMMERCIAL

## 2022-05-27 ENCOUNTER — HOSPITAL ENCOUNTER (OUTPATIENT)
Dept: RADIATION THERAPY | Facility: HOSPITAL | Age: 27
Discharge: HOME | End: 2022-05-27
Attending: STUDENT IN AN ORGANIZED HEALTH CARE EDUCATION/TRAINING PROGRAM
Payer: COMMERCIAL

## 2022-05-27 DIAGNOSIS — C08.9 MUCOEPIDERMOID CARCINOMA OF SALIVARY GLAND: Primary | ICD-10-CM

## 2022-05-27 DIAGNOSIS — D49.0 PAROTID NEOPLASM: ICD-10-CM

## 2022-05-27 LAB
B-HCG UR QL: NEGATIVE
CTP QC/QA: YES

## 2022-05-27 PROCEDURE — 77014 PR  CT GUIDANCE PLACEMENT RAD THERAPY FIELDS: ICD-10-PCS | Mod: 26,,, | Performed by: STUDENT IN AN ORGANIZED HEALTH CARE EDUCATION/TRAINING PROGRAM

## 2022-05-27 PROCEDURE — 77334 RADIATION TREATMENT AID(S): CPT | Mod: TC | Performed by: STUDENT IN AN ORGANIZED HEALTH CARE EDUCATION/TRAINING PROGRAM

## 2022-05-27 PROCEDURE — 77263 PR  RADIATION THERAPY PLAN COMPLEX: ICD-10-PCS | Mod: ,,, | Performed by: STUDENT IN AN ORGANIZED HEALTH CARE EDUCATION/TRAINING PROGRAM

## 2022-05-27 PROCEDURE — 81025 URINE PREGNANCY TEST: CPT | Performed by: STUDENT IN AN ORGANIZED HEALTH CARE EDUCATION/TRAINING PROGRAM

## 2022-05-27 PROCEDURE — 77334 RADIATION TREATMENT AID(S): CPT | Mod: 26,,, | Performed by: STUDENT IN AN ORGANIZED HEALTH CARE EDUCATION/TRAINING PROGRAM

## 2022-05-27 PROCEDURE — 77014 HC CT GUIDANCE RADIATION THERAPY FLDS PLACEMENT: CPT | Mod: TC | Performed by: STUDENT IN AN ORGANIZED HEALTH CARE EDUCATION/TRAINING PROGRAM

## 2022-05-27 PROCEDURE — 77014 PR  CT GUIDANCE PLACEMENT RAD THERAPY FIELDS: CPT | Mod: 26,,, | Performed by: STUDENT IN AN ORGANIZED HEALTH CARE EDUCATION/TRAINING PROGRAM

## 2022-05-27 PROCEDURE — 77334 PR  RADN TREATMENT AID(S) COMPLX: ICD-10-PCS | Mod: 26,,, | Performed by: STUDENT IN AN ORGANIZED HEALTH CARE EDUCATION/TRAINING PROGRAM

## 2022-05-27 PROCEDURE — 77263 THER RADIOLOGY TX PLNG CPLX: CPT | Mod: ,,, | Performed by: STUDENT IN AN ORGANIZED HEALTH CARE EDUCATION/TRAINING PROGRAM

## 2022-06-01 ENCOUNTER — HOSPITAL ENCOUNTER (OUTPATIENT)
Dept: RADIATION THERAPY | Facility: HOSPITAL | Age: 27
Discharge: HOME | End: 2022-06-01
Attending: STUDENT IN AN ORGANIZED HEALTH CARE EDUCATION/TRAINING PROGRAM
Payer: COMMERCIAL

## 2022-06-01 NOTE — PROGRESS NOTES
"Oncology Nutrition Assessment for Medical Nutrition Therapy  Initial Visit    Yoana Thomas   1995    Referring Provider:  Ricky Blanco Jr., *      Reason for Visit: Pt in for education and nutrition counseling     PMHx:   Past Medical History:   Diagnosis Date    Anxiety     Attention deficit hyperactivity disorder (ADHD), predominantly inattentive type 6/14/2021       Nutrition Assessment    This is a 27 y.o.female with mucoepidermoid carcinoma of the left parotid s/p left parotid excision (3/21/22) and left neck dissection (4/20/22). Referred to nutrition for evaluation and counseling prior to adjuvant radiation.   She reports she has been trying to prepare for treatment as best as possible. She cut down on coffee to one serving per day and is working with the  in charge of the cafeteria at work to help with meals there. She has also prepped over 30 smoothie packs at home and has them ready in the freezer. All of her meal prepping has been plant based and she is interested in following a plant-based diet as much as possible during treatment. She prefers savory foods to sweet so she does not tend to eat a lot of sugar.   She has already met with Speech and has purchased oral care products.   Currently she drinks 3 bottles of water per day while at work and a little more water when at home.   She does have concerns about hormonal imbalance with weight gain since April.     Weight:89 kg (196 lb 3.4 oz)  Height:5' 2" (1.575 m)  BMI:Body mass index is 35.89 kg/m².   IBW: Ideal body weight: 50.1 kg (110 lb 7.2 oz)  Adjusted ideal body weight: 65.7 kg (144 lb 12.1 oz)    Usual BW: 180lb  Weight Change: about 15lb gain    Allergies: Patient has no known allergies.    Current Medications:    Current Outpatient Medications:     paroxetine (PAXIL) 40 MG tablet, TAKE 1 TABLET BY MOUTH EVERY DAY IN THE MORNING, Disp: 90 tablet, Rfl: 1    Vitamins/Supplements: advised against supplements, especially " high in antioxidants     Labs: Reviewed from 4/21    Nutrition Diagnosis    Problem: nutrition related knowledge deficit   Etiology (related to): lack of prior need for nutrition education  Signs/Symptoms (as evidenced by): new head and neck cancer diagnosis     Nutrition Intervention    Nutrition Prescription   4329-9886 Kcals (20-25kcal/kg)  89 g protein (1g/kg)   7711-2843 mL fluid (25-30mL/kg)    Recommendations:  Increase water intake to 4-6 bottles per day   Continue regular diet as long as possible   Plant based smoothies for weight maintenance   -discussed using [employer] plant-based for the smoothie base when mixing them   Continue to avoid added sugars as much as possible   Referral to integrative medicine     Materials Provided/Reviewed   Soft, high protein foods   High protein-calorie shakes and bars     Nutrition Monitoring and Evaluation    Monitor: energy intake, diet tolerance  and weight    Goals: maintain weight within 5% during treatment     Follow up Weekly in Radiation Therapy     Communication to referring provider/care team: note available in chart     Counseling time: 45 Minutes    Manda Castellano, MPH, RD, , LDN, FAND   [phone]

## 2022-06-02 ENCOUNTER — CLINICAL SUPPORT (OUTPATIENT)
Dept: HEMATOLOGY/ONCOLOGY | Facility: CLINIC | Age: 27
End: 2022-06-02
Payer: COMMERCIAL

## 2022-06-02 ENCOUNTER — PATIENT MESSAGE (OUTPATIENT)
Dept: SPEECH THERAPY | Facility: HOSPITAL | Age: 27
End: 2022-06-02
Payer: COMMERCIAL

## 2022-06-02 VITALS — HEIGHT: 62 IN | BODY MASS INDEX: 36.1 KG/M2 | WEIGHT: 196.19 LBS

## 2022-06-02 DIAGNOSIS — C08.9 MUCOEPIDERMOID CARCINOMA OF SALIVARY GLAND: ICD-10-CM

## 2022-06-02 DIAGNOSIS — Z71.3 NUTRITIONAL COUNSELING: Primary | ICD-10-CM

## 2022-06-02 PROCEDURE — 99999 PR PBB SHADOW E&M-EST. PATIENT-LVL II: CPT | Mod: PBBFAC,,, | Performed by: DIETITIAN, REGISTERED

## 2022-06-02 PROCEDURE — 99999 PR PBB SHADOW E&M-EST. PATIENT-LVL II: ICD-10-PCS | Mod: PBBFAC,,, | Performed by: DIETITIAN, REGISTERED

## 2022-06-02 PROCEDURE — 97802 PR MED NUTR THER, 1ST, INDIV, EA 15 MIN: ICD-10-PCS | Mod: S$GLB,,, | Performed by: DIETITIAN, REGISTERED

## 2022-06-02 PROCEDURE — 97802 MEDICAL NUTRITION INDIV IN: CPT | Mod: S$GLB,,, | Performed by: DIETITIAN, REGISTERED

## 2022-06-03 PROCEDURE — 77301 PR  INTEN MOD RADIOTHER PLAN W/DOSE VOL HIST: ICD-10-PCS | Mod: 26,,, | Performed by: STUDENT IN AN ORGANIZED HEALTH CARE EDUCATION/TRAINING PROGRAM

## 2022-06-03 PROCEDURE — 77301 RADIOTHERAPY DOSE PLAN IMRT: CPT | Mod: TC | Performed by: STUDENT IN AN ORGANIZED HEALTH CARE EDUCATION/TRAINING PROGRAM

## 2022-06-03 PROCEDURE — 77301 RADIOTHERAPY DOSE PLAN IMRT: CPT | Mod: 26,,, | Performed by: STUDENT IN AN ORGANIZED HEALTH CARE EDUCATION/TRAINING PROGRAM

## 2022-06-04 PROCEDURE — 77300 PR RADIATION THERAPY,DOSIMETRY PLAN: ICD-10-PCS | Mod: 26,,, | Performed by: STUDENT IN AN ORGANIZED HEALTH CARE EDUCATION/TRAINING PROGRAM

## 2022-06-04 PROCEDURE — 77300 RADIATION THERAPY DOSE PLAN: CPT | Mod: TC | Performed by: STUDENT IN AN ORGANIZED HEALTH CARE EDUCATION/TRAINING PROGRAM

## 2022-06-04 PROCEDURE — 77300 RADIATION THERAPY DOSE PLAN: CPT | Mod: 26,,, | Performed by: STUDENT IN AN ORGANIZED HEALTH CARE EDUCATION/TRAINING PROGRAM

## 2022-06-04 PROCEDURE — 77470 SPECIAL RADIATION TREATMENT: CPT | Mod: 26,59,, | Performed by: STUDENT IN AN ORGANIZED HEALTH CARE EDUCATION/TRAINING PROGRAM

## 2022-06-04 PROCEDURE — 77470 PR  SPECIAL RADIATION TREATMENT: ICD-10-PCS | Mod: 26,59,, | Performed by: STUDENT IN AN ORGANIZED HEALTH CARE EDUCATION/TRAINING PROGRAM

## 2022-06-04 PROCEDURE — 77338 DESIGN MLC DEVICE FOR IMRT: CPT | Mod: TC | Performed by: STUDENT IN AN ORGANIZED HEALTH CARE EDUCATION/TRAINING PROGRAM

## 2022-06-04 PROCEDURE — 77338 DESIGN MLC DEVICE FOR IMRT: CPT | Mod: 26,,, | Performed by: STUDENT IN AN ORGANIZED HEALTH CARE EDUCATION/TRAINING PROGRAM

## 2022-06-04 PROCEDURE — 77470 SPECIAL RADIATION TREATMENT: CPT | Mod: 59,TC | Performed by: STUDENT IN AN ORGANIZED HEALTH CARE EDUCATION/TRAINING PROGRAM

## 2022-06-04 PROCEDURE — 77338 PR  MLC IMRT DESIGN & CONSTRUCTION PER IMRT PLAN: ICD-10-PCS | Mod: 26,,, | Performed by: STUDENT IN AN ORGANIZED HEALTH CARE EDUCATION/TRAINING PROGRAM

## 2022-06-06 ENCOUNTER — TELEPHONE (OUTPATIENT)
Dept: HEMATOLOGY/ONCOLOGY | Facility: CLINIC | Age: 27
End: 2022-06-06
Payer: COMMERCIAL

## 2022-06-06 ENCOUNTER — DOCUMENTATION ONLY (OUTPATIENT)
Dept: RADIATION ONCOLOGY | Facility: CLINIC | Age: 27
End: 2022-06-06
Payer: COMMERCIAL

## 2022-06-06 PROCEDURE — 77014 PR  CT GUIDANCE PLACEMENT RAD THERAPY FIELDS: ICD-10-PCS | Mod: 26,,, | Performed by: STUDENT IN AN ORGANIZED HEALTH CARE EDUCATION/TRAINING PROGRAM

## 2022-06-06 PROCEDURE — 77386 HC IMRT, COMPLEX: CPT | Performed by: STUDENT IN AN ORGANIZED HEALTH CARE EDUCATION/TRAINING PROGRAM

## 2022-06-06 PROCEDURE — 77014 HC CT GUIDANCE RADIATION THERAPY FLDS PLACEMENT: CPT | Mod: TC | Performed by: STUDENT IN AN ORGANIZED HEALTH CARE EDUCATION/TRAINING PROGRAM

## 2022-06-06 PROCEDURE — 77014 PR  CT GUIDANCE PLACEMENT RAD THERAPY FIELDS: CPT | Mod: 26,,, | Performed by: STUDENT IN AN ORGANIZED HEALTH CARE EDUCATION/TRAINING PROGRAM

## 2022-06-06 NOTE — TELEPHONE ENCOUNTER
----- Message from Manda Castellano RD sent at 6/6/2022  1:05 PM CDT -----  Wanted to follow up on  this patient. She would like to see Carisa or Dr. Apple do to possible hormonal issues she's having since surgery. She's a head/neck cancer patient just starting treatment.     Thanks!

## 2022-06-07 DIAGNOSIS — R13.12 DYSPHAGIA, OROPHARYNGEAL: Primary | ICD-10-CM

## 2022-06-07 DIAGNOSIS — C08.9 MUCOEPIDERMOID CARCINOMA OF SALIVARY GLAND: ICD-10-CM

## 2022-06-07 PROCEDURE — 77014 HC CT GUIDANCE RADIATION THERAPY FLDS PLACEMENT: CPT | Mod: TC | Performed by: STUDENT IN AN ORGANIZED HEALTH CARE EDUCATION/TRAINING PROGRAM

## 2022-06-07 PROCEDURE — 77014 PR  CT GUIDANCE PLACEMENT RAD THERAPY FIELDS: ICD-10-PCS | Mod: 26,,, | Performed by: STUDENT IN AN ORGANIZED HEALTH CARE EDUCATION/TRAINING PROGRAM

## 2022-06-07 PROCEDURE — 77014 PR  CT GUIDANCE PLACEMENT RAD THERAPY FIELDS: CPT | Mod: 26,,, | Performed by: STUDENT IN AN ORGANIZED HEALTH CARE EDUCATION/TRAINING PROGRAM

## 2022-06-07 PROCEDURE — 77386 HC IMRT, COMPLEX: CPT | Performed by: STUDENT IN AN ORGANIZED HEALTH CARE EDUCATION/TRAINING PROGRAM

## 2022-06-08 PROCEDURE — 77014 PR  CT GUIDANCE PLACEMENT RAD THERAPY FIELDS: CPT | Mod: 26,,, | Performed by: STUDENT IN AN ORGANIZED HEALTH CARE EDUCATION/TRAINING PROGRAM

## 2022-06-08 PROCEDURE — 77386 HC IMRT, COMPLEX: CPT | Performed by: STUDENT IN AN ORGANIZED HEALTH CARE EDUCATION/TRAINING PROGRAM

## 2022-06-08 PROCEDURE — 77014 HC CT GUIDANCE RADIATION THERAPY FLDS PLACEMENT: CPT | Mod: TC | Performed by: STUDENT IN AN ORGANIZED HEALTH CARE EDUCATION/TRAINING PROGRAM

## 2022-06-08 PROCEDURE — 77014 PR  CT GUIDANCE PLACEMENT RAD THERAPY FIELDS: ICD-10-PCS | Mod: 26,,, | Performed by: STUDENT IN AN ORGANIZED HEALTH CARE EDUCATION/TRAINING PROGRAM

## 2022-06-09 ENCOUNTER — OFFICE VISIT (OUTPATIENT)
Dept: HEMATOLOGY/ONCOLOGY | Facility: CLINIC | Age: 27
End: 2022-06-09
Payer: COMMERCIAL

## 2022-06-09 VITALS
WEIGHT: 193.56 LBS | SYSTOLIC BLOOD PRESSURE: 125 MMHG | HEART RATE: 85 BPM | HEIGHT: 62 IN | DIASTOLIC BLOOD PRESSURE: 74 MMHG | BODY MASS INDEX: 35.62 KG/M2

## 2022-06-09 DIAGNOSIS — L70.9 ACNE, UNSPECIFIED ACNE TYPE: Primary | ICD-10-CM

## 2022-06-09 DIAGNOSIS — N93.9 ABNORMAL UTERINE BLEEDING (AUB): ICD-10-CM

## 2022-06-09 DIAGNOSIS — C08.9 MUCOEPIDERMOID CARCINOMA OF SALIVARY GLAND: ICD-10-CM

## 2022-06-09 PROCEDURE — 99999 PR PBB SHADOW E&M-EST. PATIENT-LVL III: ICD-10-PCS | Mod: PBBFAC,,, | Performed by: PHYSICIAN ASSISTANT

## 2022-06-09 PROCEDURE — 99999 PR PBB SHADOW E&M-EST. PATIENT-LVL III: CPT | Mod: PBBFAC,,, | Performed by: PHYSICIAN ASSISTANT

## 2022-06-09 PROCEDURE — 77386 HC IMRT, COMPLEX: CPT | Performed by: STUDENT IN AN ORGANIZED HEALTH CARE EDUCATION/TRAINING PROGRAM

## 2022-06-09 PROCEDURE — 3074F PR MOST RECENT SYSTOLIC BLOOD PRESSURE < 130 MM HG: ICD-10-PCS | Mod: CPTII,S$GLB,, | Performed by: PHYSICIAN ASSISTANT

## 2022-06-09 PROCEDURE — 77014 PR  CT GUIDANCE PLACEMENT RAD THERAPY FIELDS: CPT | Mod: 26,,, | Performed by: STUDENT IN AN ORGANIZED HEALTH CARE EDUCATION/TRAINING PROGRAM

## 2022-06-09 PROCEDURE — 99204 PR OFFICE/OUTPT VISIT, NEW, LEVL IV, 45-59 MIN: ICD-10-PCS | Mod: S$GLB,,, | Performed by: PHYSICIAN ASSISTANT

## 2022-06-09 PROCEDURE — 3008F BODY MASS INDEX DOCD: CPT | Mod: CPTII,S$GLB,, | Performed by: PHYSICIAN ASSISTANT

## 2022-06-09 PROCEDURE — 1159F PR MEDICATION LIST DOCUMENTED IN MEDICAL RECORD: ICD-10-PCS | Mod: CPTII,S$GLB,, | Performed by: PHYSICIAN ASSISTANT

## 2022-06-09 PROCEDURE — 1159F MED LIST DOCD IN RCRD: CPT | Mod: CPTII,S$GLB,, | Performed by: PHYSICIAN ASSISTANT

## 2022-06-09 PROCEDURE — 1160F RVW MEDS BY RX/DR IN RCRD: CPT | Mod: CPTII,S$GLB,, | Performed by: PHYSICIAN ASSISTANT

## 2022-06-09 PROCEDURE — 3008F PR BODY MASS INDEX (BMI) DOCUMENTED: ICD-10-PCS | Mod: CPTII,S$GLB,, | Performed by: PHYSICIAN ASSISTANT

## 2022-06-09 PROCEDURE — 77014 PR  CT GUIDANCE PLACEMENT RAD THERAPY FIELDS: ICD-10-PCS | Mod: 26,,, | Performed by: STUDENT IN AN ORGANIZED HEALTH CARE EDUCATION/TRAINING PROGRAM

## 2022-06-09 PROCEDURE — 3078F DIAST BP <80 MM HG: CPT | Mod: CPTII,S$GLB,, | Performed by: PHYSICIAN ASSISTANT

## 2022-06-09 PROCEDURE — 3074F SYST BP LT 130 MM HG: CPT | Mod: CPTII,S$GLB,, | Performed by: PHYSICIAN ASSISTANT

## 2022-06-09 PROCEDURE — 1160F PR REVIEW ALL MEDS BY PRESCRIBER/CLIN PHARMACIST DOCUMENTED: ICD-10-PCS | Mod: CPTII,S$GLB,, | Performed by: PHYSICIAN ASSISTANT

## 2022-06-09 PROCEDURE — 77014 HC CT GUIDANCE RADIATION THERAPY FLDS PLACEMENT: CPT | Mod: TC | Performed by: STUDENT IN AN ORGANIZED HEALTH CARE EDUCATION/TRAINING PROGRAM

## 2022-06-09 PROCEDURE — 3078F PR MOST RECENT DIASTOLIC BLOOD PRESSURE < 80 MM HG: ICD-10-PCS | Mod: CPTII,S$GLB,, | Performed by: PHYSICIAN ASSISTANT

## 2022-06-09 PROCEDURE — 99204 OFFICE O/P NEW MOD 45 MIN: CPT | Mod: S$GLB,,, | Performed by: PHYSICIAN ASSISTANT

## 2022-06-09 NOTE — PROGRESS NOTES
Subjective:      Yoana Thomas is a 27 y.o. female who presents for survivorship. 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 She is currently receiving adjuvant radiation therapy, on week 1 of 6.  Started having periods around age 12 or 13 and were always regular. About 1 year ago started to have irregular cycles. Would miss some months and then go back to being regular. She was finishing her regular period on the day of her last surgery, 22, but then continued to have spotting x 5 weeks. Then had a regular period after. Bleeding stopped about 2 weeks ago. Denies dysmenorrhea, pelvic pain or discharge. She is interested in a supplement to help balance her hormone levels. She is not currently sexually active. Was on OCP for about 1 year at age 18 but is trying to avoid contraception due to concerns about it effecting her fertility long term. She is . She has had +HPV on pap previously, but most recent was negative at gyn in [place]. She has not had the HPV vaccine.   She also reports acne across her cheeks and unwanted hair growth at her chin. She is pulling these and doing dermaplaning at home.   Lost 30 pounds initially with covid. Has gained most back since 2021.  Increased stress with cancer diagnosis. Josie with spending time with friends and eating foods she enjoys. Has good support at work, with friends and dad.    PAP: 2021 negative per pt in [place] (history of HPV previously)    Hospital Outpatient Visit on 2022   Component Date Value Ref Range Status    POC Preg Test, Ur 2022 Negative  Negative Final-Edited     Acceptable 2022 Yes   Final-Edited   Clinical Support on 05/10/2022   Component Date Value Ref Range Status    POC Cholesterol, Total 05/10/2022 219  <240 MG/DL Final    POC Cholesterol, HDL 05/10/2022 76  MG/DL Final    POC Cholesterol, LDL  "05/10/2022 133.21  <160 MG/DL Final    POC Triglycerides 05/10/2022 56  <160 MG/DL Final    POC Glucose 05/10/2022 112  60 - 140 MG/DL Final   Admission on 2022, Discharged on 2022   Component Date Value Ref Range Status    POC Preg Test, Ur 2022 Negative  Negative Final     Acceptable 2022 Yes   Final    Final Pathologic Diagnosis 2022    Final                    Value:Lymph nodes, left neck levels 2-4, dissection:  - Twenty-nine benign lymph nodes, negative for metastatic carcinoma (0/29)  - Additional levels were performed and examined      Microscopic Exam 2022 Microscopic examination performed.   Final    Gross 2022    Final                    Value:Patient ID/Pathology ID [id number]  Received fresh and subsequently fixed in formalin, labeled "left neck  dissection levels 2-4", are 2 fragments of unoriented, tan-yellow hemorrhagic  fibroadipose tissue.  In aggregate the specimen measures 7.5 x 4.5 cm.  The  specimen is searched for lymph nodes.  Twenty-three possible lymph nodes  identified and entirely submitted in cassettes [id number]:  A-B:  Single bisected lymph node in each cassette  C-F:  5 lymph nodes in each cassette   lymph nodes.  Gordon RANGEL ([facility]) cm      Disclaimer 2022    Final                    Value:Unless the case is a 'gross only' or additional testing only, the final  diagnosis for each specimen is based on a microscopic examination of  appropriate tissue sections.      Sodium 2022 140  136 - 145 mmol/L Final    Potassium 2022 3.7  3.5 - 5.1 mmol/L Final    Chloride 2022 104  95 - 110 mmol/L Final    CO2 2022 30 (A) 23 - 29 mmol/L Final    Glucose 2022 131 (A) 70 - 110 mg/dL Final    BUN 2022 9  6 - 20 mg/dL Final    Creatinine 2022 0.7  0.5 - 1.4 mg/dL Final    Calcium 2022 8.7  8.7 - 10.5 mg/dL Final    Anion Gap 2022 6 (A) 8 - 16 mmol/L Final    " eGFR if African American 04/21/2022 >60.0  >60 mL/min/1.73 m^2 Final    eGFR if non African American 04/21/2022 >60.0  >60 mL/min/1.73 m^2 Final   Admission on 03/21/2022, Discharged on 03/22/2022   Component Date Value Ref Range Status    POC Preg Test, Ur 03/21/2022 Negative  Negative Final     Acceptable 03/21/2022 Yes   Final    Final Pathologic Diagnosis 03/21/2022    Final                    Value:Parotid gland, left, excision:  - Mucoepidermoid carcinoma, intermediate grade    - 2.2 cm in greatest dimension    - No extraparenchymal extension (pT2)  - Margins uninvolved by invasive carcinoma    - 0.1 cm to resection margin  - One of four lymph nodes, positive for metastatic carcinoma (1/4, pN1)    - Size of Largest Metastatic Deposit: 0.2 cm    - No extranodal extension identified  - Lymphovascular invasion identified  - No perineural invasion identified    CAP Synoptic Checklist for Major Salivary Gland Neoplasms:    - Procedure: Excision    - Tumor Focality: Unifocal    - Tumor Site: Parotid gland, superficial lobe    - Tumor Laterality: Left    - Tumor Size: 2.2 cm    - Histologic Type: Mucoepidermoid carcinoma, intermediate grade    - High Grade Transformation: Not identified    - Macroscopic Tumor Extent: No evidence of extraparenchymal extension    - Lymphovascular Invasion: Present    - Perineural Invasion: Not identified    - Margin Status: All margins negative for carci                          noma      - Distance from Invasive Tumor to Closest Margin:        - 0.1 cm to resection margin    - Regional Lymph Node Status:      - Number of Lymph Nodes wtih Tumor: 1      - Laterality of Nodes with Tumor: Ipsilateral      - Size of Largest [place] Metastatic Deposit: 0.2 cm      - Extranodal Extension: Not identified      - Number of Lymph Nodes Examined: 4    - Distant Metastasis: Not applicable    - Pathologic Stage Classification: pT2 pN1    - Additional Findings: None identified    -  "Special Studies: Available at clinician's request    - Designate Block for Future Studies: [id number]      Comment 03/21/2022    Final                    Value:This case was reviewed by Dr. KATHYA Díaz, [facility], who  agreed with the above diagnosis.      Microscopic Exam 03/21/2022    Final                    Value:Microscopic examination showed an infiltrative malignant epithelial neoplasm  with epidermoid and mucinous cells in a predominantly solid growth pattern  (cystic component is approximately 20%), with moderate mitotic activity (2  mitoses per 10 high-power fields) and no necrosis; these findings were  consistent with the diagnosis of mucoepidermoid carcinoma, intermediate grade.  Immunostains for AE1/AE3, p63, p40, S100, , SMA, GATA3 and Ki67  proliferation index were performed: AE1/AE3, p63 and p40 were diffusely  immunopositive; S100 showed rare, weak staining of the malignant neoplastic  cells; , SMA and GATA3 were negative in the malignant neoplastic cells;  Ki67 proliferation index was up to 5%.  Positive controls and internal  negative controls for immunostains were examined and were appropriate.      Gross 03/21/2022    Final                    Value:Container Label and Clinic/AP Number:  [id number]  Received in formalin labeled "1. Left parotid mass" is a 15 g, 4.8 x 3.1 x  2.1 cm ragged, tan-yellow, unoriented parotidectomy specimen partially  surrounded by roughened, pink-tan fibromembranous tissue.  The outer surface  is inked black, and the specimen is serially sectioned to reveal a 1.7 x 1.6  x 1.3 cm  biloculated cyst filled with pale yellow mucinous fluid.  The cyst  lining contains focal areas of granularity, and the cystic mass grossly abuts  the black-inked outer surface.  Additionally, there is a 2.2 x 1.8 x 1.4 cm  firm, yellow-white, poorly demarcated area of suspicion grossly abutting the  previously-described cystic lesion and grossly abutting the " black-inked outer  surface.  Lastly, there is a 1.7 x 1.5 x 0.8 cm red-brown possible lymph node  surrounded by tan-yellow, lobulated tissue.  The possible lymph node grossly  extends 0.7 cm from the cystic mass.  The remaining cut surface is tan-yellow, lobulated glandular                           tissue.  Representative sections of [id number] are submitted as follows:  Cassette key:  OGR--1-A through KNL--1-C:  Cystic mass, submitted entirely  MJW--1-D and QYG--1-E:  Firm mass adjacent to cystic mass  CSC--1-F:  Firm mass with adjacent parotid parenchyma  EEM--1-G:  Possible lymph node, submitted entirely  RAA--1-H:  Representative sections of grossly uninvolved parotid  parenchyma  Gross by: JUAN CARLOS Ramsey ([facility])      Disclaimer 03/21/2022    Final                    Value:Unless the case is a 'gross only' or additional testing only, the final  diagnosis for each specimen is based on a microscopic examination of  appropriate tissue sections.   (c-KIT) immunohistochemical staining (clone 9.7, DAB detection method)  is performed on formalin-fixed (10% neutral buffered formalin), paraffin  embedded tissue sections. GIST tumors are considered positive for c-KIT  protein expression if any neoplastic cells demonstrate specific cytoplasmic  and/or cell membrane staining. Strong cytoplasmic, membrane, and occasional  dot-like perinuclear Golgi staining are reliable indicators of c-KIT  expression. Staining of c-KIT in GIST is often heterogeneous and not all  neoplastic cells display positivity. This test was developed and performance  characteristics determined by [facility], Section of Anatomic  Pathology. It has been cleared by the U.S. Food and Drug Administration.      Sodium 03/22/2022 138  136 - 145 mmol/L Final    Potassium 03/22/2022 3.9  3.5 - 5.1 mmol/L Final    Chloride 03/22/2022 106  95 - 110 mmol/L Final    CO2 03/22/2022 25  23 - 29 mmol/L  Final    Glucose 03/22/2022 124 (A) 70 - 110 mg/dL Final    BUN 03/22/2022 10  6 - 20 mg/dL Final    Creatinine 03/22/2022 0.7  0.5 - 1.4 mg/dL Final    Calcium 03/22/2022 8.7  8.7 - 10.5 mg/dL Final    Anion Gap 03/22/2022 7 (A) 8 - 16 mmol/L Final    eGFR if African American 03/22/2022 >60.0  >60 mL/min/1.73 m^2 Final    eGFR if non African American 03/22/2022 >60.0  >60 mL/min/1.73 m^2 Final   Lab Visit on 03/16/2022   Component Date Value Ref Range Status    Group & Rh 03/16/2022 B POS   Final    Indirect Earline 03/16/2022 NEG   Final       Past Medical History:   Diagnosis Date    Anxiety     Attention deficit hyperactivity disorder (ADHD), predominantly inattentive type 6/14/2021     Past Surgical History:   Procedure Laterality Date    CREATION OF MUSCLE ROTATIONAL FLAP Left 3/21/2022    Procedure: CREATION, FLAP, MUSCLE ROTATION-SCM;  Surgeon: Edel Hall MD;  Location: [facility] OR [address] [place] [address];  Service: ENT;  Laterality: Left;    DISSECTION OF NECK Left 4/20/2022    Procedure: DISSECTION, NECK;  Surgeon: Edel Hall MD;  Location: [facility] OR [address] [place] [address];  Service: ENT;  Laterality: Left;    EXCISION OF PAROTID GLAND Left 3/21/2022    Procedure: EXCISION, PAROTID GLAND-nerve monitoring;  Surgeon: Edel Hall MD;  Location: [address];  Service: ENT;  Laterality: Left;    KELOID EXCISION      Left ear and she also had radiation Tx    WISDOM TOOTH EXTRACTION       Social History     Tobacco Use    Smoking status: Never Smoker    Smokeless tobacco: Never Used   Substance Use Topics    Alcohol use: Yes     Comment: once every couple mos.     Drug use: Never     Family History   Problem Relation Age of Onset    Skin cancer Father     Hypertension Father     Infertility Sister     Diabetes type I Maternal Grandmother     Heart disease Maternal Grandmother     Kidney failure Maternal Grandmother         ESRD    Diabetes type II Maternal Grandfather     Lung cancer  "Paternal Grandfather      OB History   No obstetric history on file.       Current Outpatient Medications:     paroxetine (PAXIL) 40 MG tablet, TAKE 1 TABLET BY MOUTH EVERY DAY IN THE MORNING, Disp: 90 tablet, Rfl: 1    The ASCVD Risk score (Surya MALDONADO Jr., et al., 2013) failed to calculate for the following reasons:    The 2013 ASCVD risk score is only valid for ages 40 to 79    Review of Systems:  General: No fever, chills, or weight loss.  Chest: No chest pain, shortness of breath, or palpitations.  Breast: No pain, masses, or nipple discharge.  Vulva: No pain, lesions, or itching.  Vagina: No relaxation, itching, discharge, or lesions.  Abdomen: No pain, nausea, vomiting, diarrhea, or constipation.  Urinary: No incontinence, nocturia, frequency, or dysuria.  Extremities:  No leg cramps, edema, or calf pain.  Neurologic: No headaches, dizziness, or visual changes.    Objective:     Vitals:    06/09/22 0834   BP: 125/74   Pulse: 85   Weight: 87.8 kg (193 lb 9 oz)   Height: 5' 2" (1.575 m)   PainSc: 0-No pain     Body mass index is 35.4 kg/m².    PHYSICAL EXAM:  APPEARANCE: Well nourished, well developed, in no acute distress.  AFFECT: WNL, alert and oriented x 3    Assessment:      Acne, unspecified acne type    Abnormal uterine bleeding (AUB)    Mucoepidermoid carcinoma of salivary gland        Plan:   Discussed that AUB likely due to stress and weight changes.   She declines tx or contraception at this time.  Start Vitex Berry 2 QAM. Hand out on brand.   Discussed option of Aldactone to help with unwanted facial hair growth and hormonal acne. She declines at this time but will consider.  Follow up in 8 weeks for WWE/pap. She will have completed radiation by that time.    Instructed patient to call if she experiences any side effects or has any questions.    I spent a total of 30 minutes on the day of the visit.This includes face to face time and non-face to face time preparing to see the patient (eg, review of " tests), obtaining and/or reviewing separately obtained history, documenting clinical information in the electronic or other health record, independently interpreting results and communicating results to the patient/family/caregiver, or care coordinator.

## 2022-06-10 PROCEDURE — 77014 PR  CT GUIDANCE PLACEMENT RAD THERAPY FIELDS: CPT | Mod: 26,,, | Performed by: STUDENT IN AN ORGANIZED HEALTH CARE EDUCATION/TRAINING PROGRAM

## 2022-06-10 PROCEDURE — 77386 HC IMRT, COMPLEX: CPT | Performed by: STUDENT IN AN ORGANIZED HEALTH CARE EDUCATION/TRAINING PROGRAM

## 2022-06-10 PROCEDURE — 77014 HC CT GUIDANCE RADIATION THERAPY FLDS PLACEMENT: CPT | Mod: TC | Performed by: STUDENT IN AN ORGANIZED HEALTH CARE EDUCATION/TRAINING PROGRAM

## 2022-06-10 PROCEDURE — 77014 PR  CT GUIDANCE PLACEMENT RAD THERAPY FIELDS: ICD-10-PCS | Mod: 26,,, | Performed by: STUDENT IN AN ORGANIZED HEALTH CARE EDUCATION/TRAINING PROGRAM

## 2022-06-12 PROCEDURE — 77336 RADIATION PHYSICS CONSULT: CPT | Performed by: STUDENT IN AN ORGANIZED HEALTH CARE EDUCATION/TRAINING PROGRAM

## 2022-06-13 ENCOUNTER — DOCUMENTATION ONLY (OUTPATIENT)
Dept: RADIATION ONCOLOGY | Facility: CLINIC | Age: 27
End: 2022-06-13
Payer: COMMERCIAL

## 2022-06-13 ENCOUNTER — DOCUMENTATION ONLY (OUTPATIENT)
Dept: HEMATOLOGY/ONCOLOGY | Facility: CLINIC | Age: 27
End: 2022-06-13
Payer: COMMERCIAL

## 2022-06-13 PROCEDURE — 77014 PR  CT GUIDANCE PLACEMENT RAD THERAPY FIELDS: ICD-10-PCS | Mod: 26,,, | Performed by: STUDENT IN AN ORGANIZED HEALTH CARE EDUCATION/TRAINING PROGRAM

## 2022-06-13 PROCEDURE — 77386 HC IMRT, COMPLEX: CPT | Performed by: STUDENT IN AN ORGANIZED HEALTH CARE EDUCATION/TRAINING PROGRAM

## 2022-06-13 PROCEDURE — 77014 HC CT GUIDANCE RADIATION THERAPY FLDS PLACEMENT: CPT | Mod: TC | Performed by: STUDENT IN AN ORGANIZED HEALTH CARE EDUCATION/TRAINING PROGRAM

## 2022-06-13 PROCEDURE — 77014 PR  CT GUIDANCE PLACEMENT RAD THERAPY FIELDS: CPT | Mod: 26,,, | Performed by: STUDENT IN AN ORGANIZED HEALTH CARE EDUCATION/TRAINING PROGRAM

## 2022-06-13 RX ORDER — ONDANSETRON 4 MG/1
4 TABLET, FILM COATED ORAL EVERY 12 HOURS PRN
Qty: 30 TABLET | Refills: 1 | Status: SHIPPED | OUTPATIENT
Start: 2022-06-13 | End: 2022-10-19

## 2022-06-13 NOTE — PROGRESS NOTES
Oncology Nutrition  Radiation Oncology Weekly Check     Treatment Week: 2 (Day 6)     Yoana Thomas   1995    Reason for Visit: Pt here for weekly visit due to high nutritional risk radiation therapy treatment (mucoepidermoid carcinoma of the left parotid)    Nutrition Assessment    Patient reports she tolerated the first week of treatment well. Has some slight loss of taste and dry mouth. She is using xylimelts at night and Biotene spray during the day. Also doing regular baking soda/salt water rinses.   She reports a slight loss of taste, but this has not affected PO intake.     Weight: 200.5lb  Height:  62in    Usual BW: 180lb; 196lb at consult   Weight Change:4lb gain     Nutrition Impact Symptoms    Dysgeusia   Xerostomia     Nutrition Recommendations    Continue regular diet   At least 2L water daily   Continue oral care  Consider humidifier at night    Follow up weekly during radiation    Manda Castellano, MPH, RD, , LDN, FAND   [phone]

## 2022-06-14 PROCEDURE — 77386 HC IMRT, COMPLEX: CPT | Performed by: STUDENT IN AN ORGANIZED HEALTH CARE EDUCATION/TRAINING PROGRAM

## 2022-06-14 PROCEDURE — 77014 HC CT GUIDANCE RADIATION THERAPY FLDS PLACEMENT: CPT | Mod: TC | Performed by: STUDENT IN AN ORGANIZED HEALTH CARE EDUCATION/TRAINING PROGRAM

## 2022-06-14 PROCEDURE — 77014 PR  CT GUIDANCE PLACEMENT RAD THERAPY FIELDS: ICD-10-PCS | Mod: 26,,, | Performed by: STUDENT IN AN ORGANIZED HEALTH CARE EDUCATION/TRAINING PROGRAM

## 2022-06-14 PROCEDURE — 77014 PR  CT GUIDANCE PLACEMENT RAD THERAPY FIELDS: CPT | Mod: 26,,, | Performed by: STUDENT IN AN ORGANIZED HEALTH CARE EDUCATION/TRAINING PROGRAM

## 2022-06-15 PROCEDURE — 77014 HC CT GUIDANCE RADIATION THERAPY FLDS PLACEMENT: CPT | Mod: TC | Performed by: STUDENT IN AN ORGANIZED HEALTH CARE EDUCATION/TRAINING PROGRAM

## 2022-06-15 PROCEDURE — 77014 PR  CT GUIDANCE PLACEMENT RAD THERAPY FIELDS: CPT | Mod: 26,,, | Performed by: STUDENT IN AN ORGANIZED HEALTH CARE EDUCATION/TRAINING PROGRAM

## 2022-06-15 PROCEDURE — 77386 HC IMRT, COMPLEX: CPT | Performed by: STUDENT IN AN ORGANIZED HEALTH CARE EDUCATION/TRAINING PROGRAM

## 2022-06-15 PROCEDURE — 77014 PR  CT GUIDANCE PLACEMENT RAD THERAPY FIELDS: ICD-10-PCS | Mod: 26,,, | Performed by: STUDENT IN AN ORGANIZED HEALTH CARE EDUCATION/TRAINING PROGRAM

## 2022-06-16 PROCEDURE — 77014 PR  CT GUIDANCE PLACEMENT RAD THERAPY FIELDS: CPT | Mod: 26,,, | Performed by: STUDENT IN AN ORGANIZED HEALTH CARE EDUCATION/TRAINING PROGRAM

## 2022-06-16 PROCEDURE — 77014 PR  CT GUIDANCE PLACEMENT RAD THERAPY FIELDS: ICD-10-PCS | Mod: 26,,, | Performed by: STUDENT IN AN ORGANIZED HEALTH CARE EDUCATION/TRAINING PROGRAM

## 2022-06-16 PROCEDURE — 77014 HC CT GUIDANCE RADIATION THERAPY FLDS PLACEMENT: CPT | Mod: TC | Performed by: STUDENT IN AN ORGANIZED HEALTH CARE EDUCATION/TRAINING PROGRAM

## 2022-06-16 PROCEDURE — 77386 HC IMRT, COMPLEX: CPT | Performed by: STUDENT IN AN ORGANIZED HEALTH CARE EDUCATION/TRAINING PROGRAM

## 2022-06-17 PROCEDURE — 77014 PR  CT GUIDANCE PLACEMENT RAD THERAPY FIELDS: CPT | Mod: 26,,, | Performed by: STUDENT IN AN ORGANIZED HEALTH CARE EDUCATION/TRAINING PROGRAM

## 2022-06-17 PROCEDURE — 77014 PR  CT GUIDANCE PLACEMENT RAD THERAPY FIELDS: ICD-10-PCS | Mod: 26,,, | Performed by: STUDENT IN AN ORGANIZED HEALTH CARE EDUCATION/TRAINING PROGRAM

## 2022-06-17 PROCEDURE — 77014 HC CT GUIDANCE RADIATION THERAPY FLDS PLACEMENT: CPT | Mod: TC | Performed by: STUDENT IN AN ORGANIZED HEALTH CARE EDUCATION/TRAINING PROGRAM

## 2022-06-17 PROCEDURE — 77386 HC IMRT, COMPLEX: CPT | Performed by: STUDENT IN AN ORGANIZED HEALTH CARE EDUCATION/TRAINING PROGRAM

## 2022-06-17 PROCEDURE — 77336 RADIATION PHYSICS CONSULT: CPT | Performed by: STUDENT IN AN ORGANIZED HEALTH CARE EDUCATION/TRAINING PROGRAM

## 2022-06-20 ENCOUNTER — DOCUMENTATION ONLY (OUTPATIENT)
Dept: RADIATION THERAPY | Facility: HOSPITAL | Age: 27
End: 2022-06-20
Payer: COMMERCIAL

## 2022-06-20 ENCOUNTER — DOCUMENTATION ONLY (OUTPATIENT)
Dept: RADIATION ONCOLOGY | Facility: CLINIC | Age: 27
End: 2022-06-20
Payer: COMMERCIAL

## 2022-06-20 DIAGNOSIS — C08.9 MUCOEPIDERMOID CARCINOMA OF SALIVARY GLAND: Primary | ICD-10-CM

## 2022-06-20 DIAGNOSIS — R13.10 ODYNOPHAGIA: ICD-10-CM

## 2022-06-20 PROCEDURE — 77386 HC IMRT, COMPLEX: CPT | Performed by: STUDENT IN AN ORGANIZED HEALTH CARE EDUCATION/TRAINING PROGRAM

## 2022-06-20 PROCEDURE — 77014 PR  CT GUIDANCE PLACEMENT RAD THERAPY FIELDS: ICD-10-PCS | Mod: 26,,, | Performed by: STUDENT IN AN ORGANIZED HEALTH CARE EDUCATION/TRAINING PROGRAM

## 2022-06-20 PROCEDURE — 77014 HC CT GUIDANCE RADIATION THERAPY FLDS PLACEMENT: CPT | Mod: TC | Performed by: STUDENT IN AN ORGANIZED HEALTH CARE EDUCATION/TRAINING PROGRAM

## 2022-06-20 PROCEDURE — 77014 PR  CT GUIDANCE PLACEMENT RAD THERAPY FIELDS: CPT | Mod: 26,,, | Performed by: STUDENT IN AN ORGANIZED HEALTH CARE EDUCATION/TRAINING PROGRAM

## 2022-06-20 NOTE — PROGRESS NOTES
Oncology Nutrition  Radiation Oncology Weekly Check     Treatment Week: 3 (Day 11)     Yoana Thomas   1995    Reason for Visit: Pt here for weekly visit due to high nutritional risk radiation therapy treatment (mucoepidermoid carcinoma of the left parotid)    Nutrition Assessment    Patient reports continued dysgeusia and xerostomia. She is drinking about 80-100oz of water per day. Also rinsing with baking soda and salt water after meals and using Biotene and xylimelts. She has some mucositis as well as odynophagia this week. She has switched to soft foods, but has not needed any smoothies/protein drinks.     Weight:  198.6lb  Height:  62in    Usual BW: 180lb; 196lb at consult   Weight Change: 2lb loss from last week     Nutrition Impact Symptoms    Dysgeusia   Xerostomia   Odynophagia   Mucositis     Nutrition Recommendations    Soft diet with protein at each meal (eggs, fish, beans, etc)  Continue about 3L water daily   Continue oral care  Add magic mouthwash as needed prior to PO intake   Start smoothies 1-2 times daily if PO intake declines (she has smoothie packs pre-prepped in the freezer)     Follow up weekly during radiation    Manda Castellano, MPH, RD, , LDN, FAND   [phone]

## 2022-06-20 NOTE — PLAN OF CARE
Day 11 of outpatient radiation to the left parotid. Pt reports dry mouth and has transitioned to soft foods. Weight down 2#. Pt seen by dietitian.

## 2022-06-21 PROCEDURE — 77386 HC IMRT, COMPLEX: CPT | Performed by: STUDENT IN AN ORGANIZED HEALTH CARE EDUCATION/TRAINING PROGRAM

## 2022-06-21 PROCEDURE — 77014 PR  CT GUIDANCE PLACEMENT RAD THERAPY FIELDS: ICD-10-PCS | Mod: 26,,, | Performed by: STUDENT IN AN ORGANIZED HEALTH CARE EDUCATION/TRAINING PROGRAM

## 2022-06-21 PROCEDURE — 77014 PR  CT GUIDANCE PLACEMENT RAD THERAPY FIELDS: CPT | Mod: 26,,, | Performed by: STUDENT IN AN ORGANIZED HEALTH CARE EDUCATION/TRAINING PROGRAM

## 2022-06-21 PROCEDURE — 77014 HC CT GUIDANCE RADIATION THERAPY FLDS PLACEMENT: CPT | Mod: TC | Performed by: STUDENT IN AN ORGANIZED HEALTH CARE EDUCATION/TRAINING PROGRAM

## 2022-06-22 PROCEDURE — 77386 HC IMRT, COMPLEX: CPT | Performed by: STUDENT IN AN ORGANIZED HEALTH CARE EDUCATION/TRAINING PROGRAM

## 2022-06-22 PROCEDURE — 77014 PR  CT GUIDANCE PLACEMENT RAD THERAPY FIELDS: CPT | Mod: 26,,, | Performed by: STUDENT IN AN ORGANIZED HEALTH CARE EDUCATION/TRAINING PROGRAM

## 2022-06-22 PROCEDURE — 77014 HC CT GUIDANCE RADIATION THERAPY FLDS PLACEMENT: CPT | Mod: TC | Performed by: STUDENT IN AN ORGANIZED HEALTH CARE EDUCATION/TRAINING PROGRAM

## 2022-06-22 PROCEDURE — 77014 PR  CT GUIDANCE PLACEMENT RAD THERAPY FIELDS: ICD-10-PCS | Mod: 26,,, | Performed by: STUDENT IN AN ORGANIZED HEALTH CARE EDUCATION/TRAINING PROGRAM

## 2022-06-23 PROCEDURE — 77014 PR  CT GUIDANCE PLACEMENT RAD THERAPY FIELDS: ICD-10-PCS | Mod: 26,,, | Performed by: STUDENT IN AN ORGANIZED HEALTH CARE EDUCATION/TRAINING PROGRAM

## 2022-06-23 PROCEDURE — 77386 HC IMRT, COMPLEX: CPT | Performed by: STUDENT IN AN ORGANIZED HEALTH CARE EDUCATION/TRAINING PROGRAM

## 2022-06-23 PROCEDURE — 77014 PR  CT GUIDANCE PLACEMENT RAD THERAPY FIELDS: CPT | Mod: 26,,, | Performed by: STUDENT IN AN ORGANIZED HEALTH CARE EDUCATION/TRAINING PROGRAM

## 2022-06-23 PROCEDURE — 77014 HC CT GUIDANCE RADIATION THERAPY FLDS PLACEMENT: CPT | Mod: TC | Performed by: STUDENT IN AN ORGANIZED HEALTH CARE EDUCATION/TRAINING PROGRAM

## 2022-06-24 PROCEDURE — 77386 HC IMRT, COMPLEX: CPT | Performed by: STUDENT IN AN ORGANIZED HEALTH CARE EDUCATION/TRAINING PROGRAM

## 2022-06-24 PROCEDURE — 77385 HC IMRT, SIMPLE: CPT | Performed by: STUDENT IN AN ORGANIZED HEALTH CARE EDUCATION/TRAINING PROGRAM

## 2022-06-24 PROCEDURE — 77014 PR  CT GUIDANCE PLACEMENT RAD THERAPY FIELDS: CPT | Mod: 26,,, | Performed by: STUDENT IN AN ORGANIZED HEALTH CARE EDUCATION/TRAINING PROGRAM

## 2022-06-24 PROCEDURE — 77014 HC CT GUIDANCE RADIATION THERAPY FLDS PLACEMENT: CPT | Mod: TC | Performed by: STUDENT IN AN ORGANIZED HEALTH CARE EDUCATION/TRAINING PROGRAM

## 2022-06-24 PROCEDURE — 77014 PR  CT GUIDANCE PLACEMENT RAD THERAPY FIELDS: ICD-10-PCS | Mod: 26,,, | Performed by: STUDENT IN AN ORGANIZED HEALTH CARE EDUCATION/TRAINING PROGRAM

## 2022-06-27 ENCOUNTER — DOCUMENTATION ONLY (OUTPATIENT)
Dept: RADIATION THERAPY | Facility: HOSPITAL | Age: 27
End: 2022-06-27
Payer: COMMERCIAL

## 2022-06-27 ENCOUNTER — DOCUMENTATION ONLY (OUTPATIENT)
Dept: RADIATION ONCOLOGY | Facility: CLINIC | Age: 27
End: 2022-06-27
Payer: COMMERCIAL

## 2022-06-27 PROCEDURE — 77014 HC CT GUIDANCE RADIATION THERAPY FLDS PLACEMENT: CPT | Mod: TC | Performed by: STUDENT IN AN ORGANIZED HEALTH CARE EDUCATION/TRAINING PROGRAM

## 2022-06-27 PROCEDURE — 77386 HC IMRT, COMPLEX: CPT | Performed by: STUDENT IN AN ORGANIZED HEALTH CARE EDUCATION/TRAINING PROGRAM

## 2022-06-27 PROCEDURE — 77336 RADIATION PHYSICS CONSULT: CPT | Performed by: STUDENT IN AN ORGANIZED HEALTH CARE EDUCATION/TRAINING PROGRAM

## 2022-06-27 PROCEDURE — 77014 PR  CT GUIDANCE PLACEMENT RAD THERAPY FIELDS: ICD-10-PCS | Mod: 26,,, | Performed by: STUDENT IN AN ORGANIZED HEALTH CARE EDUCATION/TRAINING PROGRAM

## 2022-06-27 PROCEDURE — 77014 PR  CT GUIDANCE PLACEMENT RAD THERAPY FIELDS: CPT | Mod: 26,,, | Performed by: STUDENT IN AN ORGANIZED HEALTH CARE EDUCATION/TRAINING PROGRAM

## 2022-06-27 NOTE — PLAN OF CARE
Day 16 of outpatient to the left parotid. Pt tolerating treatment well. Weight stable at 200.1#'s Pt seen by dietitian.

## 2022-06-27 NOTE — PROGRESS NOTES
Oncology Nutrition  Radiation Oncology Weekly Check     Treatment Week: 4 (Day 16)     Yoana Thomas   1995    Reason for Visit: Pt here for weekly visit due to high nutritional risk radiation therapy treatment (mucoepidermoid carcinoma of the left parotid)    Nutrition Assessment    Patient reports continued dysgeusia and xerostomia. Reports some difficulty with water intake due to metallic taste. Trying to eat fruits like watermelon to stay hydrated. Odynophagia is mild. She has not needed magic mouthwash or other prescriptions. Still tolerating soft diet with no weight loss.     Weight:  200.1lb  Height:  62in    Usual BW: 180lb; 196lb at consult   Weight Change: up 2lb from last week    Nutrition Impact Symptoms    Dysgeusia   Xerostomia   Odynophagia     Nutrition Recommendations    Soft diet with protein at each meal   Try Evamor or other Alkaline pardo for metallic taste   Continue oral care  Add magic mouthwash as needed prior to PO intake   Start smoothies 1-2 times daily if PO intake declines (she has smoothie packs pre-prepped in the freezer)     Follow up weekly during radiation    Manda Castellano, MPH, RD, , LDN, FAND   [phone]

## 2022-06-28 PROCEDURE — 77014 PR  CT GUIDANCE PLACEMENT RAD THERAPY FIELDS: CPT | Mod: 26,,, | Performed by: STUDENT IN AN ORGANIZED HEALTH CARE EDUCATION/TRAINING PROGRAM

## 2022-06-28 PROCEDURE — 77386 HC IMRT, COMPLEX: CPT | Performed by: STUDENT IN AN ORGANIZED HEALTH CARE EDUCATION/TRAINING PROGRAM

## 2022-06-28 PROCEDURE — 77014 PR  CT GUIDANCE PLACEMENT RAD THERAPY FIELDS: ICD-10-PCS | Mod: 26,,, | Performed by: STUDENT IN AN ORGANIZED HEALTH CARE EDUCATION/TRAINING PROGRAM

## 2022-06-28 PROCEDURE — 77014 HC CT GUIDANCE RADIATION THERAPY FLDS PLACEMENT: CPT | Mod: TC | Performed by: STUDENT IN AN ORGANIZED HEALTH CARE EDUCATION/TRAINING PROGRAM

## 2022-06-29 PROCEDURE — 77014 PR  CT GUIDANCE PLACEMENT RAD THERAPY FIELDS: ICD-10-PCS | Mod: 26,,, | Performed by: STUDENT IN AN ORGANIZED HEALTH CARE EDUCATION/TRAINING PROGRAM

## 2022-06-29 PROCEDURE — 77014 HC CT GUIDANCE RADIATION THERAPY FLDS PLACEMENT: CPT | Mod: TC | Performed by: STUDENT IN AN ORGANIZED HEALTH CARE EDUCATION/TRAINING PROGRAM

## 2022-06-29 PROCEDURE — 77014 PR  CT GUIDANCE PLACEMENT RAD THERAPY FIELDS: CPT | Mod: 26,,, | Performed by: STUDENT IN AN ORGANIZED HEALTH CARE EDUCATION/TRAINING PROGRAM

## 2022-06-29 PROCEDURE — 77386 HC IMRT, COMPLEX: CPT | Performed by: STUDENT IN AN ORGANIZED HEALTH CARE EDUCATION/TRAINING PROGRAM

## 2022-06-30 ENCOUNTER — CLINICAL SUPPORT (OUTPATIENT)
Dept: SPEECH THERAPY | Facility: HOSPITAL | Age: 27
End: 2022-06-30
Payer: COMMERCIAL

## 2022-06-30 DIAGNOSIS — R13.12 DYSPHAGIA, OROPHARYNGEAL: ICD-10-CM

## 2022-06-30 DIAGNOSIS — C08.9 MUCOEPIDERMOID CARCINOMA OF SALIVARY GLAND: ICD-10-CM

## 2022-06-30 DIAGNOSIS — D49.0 PAROTID NEOPLASM: ICD-10-CM

## 2022-06-30 PROCEDURE — 77014 PR  CT GUIDANCE PLACEMENT RAD THERAPY FIELDS: CPT | Mod: 26,,, | Performed by: STUDENT IN AN ORGANIZED HEALTH CARE EDUCATION/TRAINING PROGRAM

## 2022-06-30 PROCEDURE — 77014 HC CT GUIDANCE RADIATION THERAPY FLDS PLACEMENT: CPT | Mod: TC | Performed by: STUDENT IN AN ORGANIZED HEALTH CARE EDUCATION/TRAINING PROGRAM

## 2022-06-30 PROCEDURE — 77014 PR  CT GUIDANCE PLACEMENT RAD THERAPY FIELDS: ICD-10-PCS | Mod: 26,,, | Performed by: STUDENT IN AN ORGANIZED HEALTH CARE EDUCATION/TRAINING PROGRAM

## 2022-06-30 PROCEDURE — 77386 HC IMRT, COMPLEX: CPT | Performed by: STUDENT IN AN ORGANIZED HEALTH CARE EDUCATION/TRAINING PROGRAM

## 2022-06-30 PROCEDURE — 92526 ORAL FUNCTION THERAPY: CPT | Mod: GN

## 2022-06-30 NOTE — PROGRESS NOTES
REFERRING PHYSICIAN:  Aly Blanco M.D., Radiationl Oncologist  REASON FOR REFERRAL: Dysphagia Treatment  HISTORY OF PRESENT ILLNESS:    6/30/22: Mrs. Thomas presents for mid-RT dysphagia treatment and ongoing counseling. 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 Today, she denies dysphagia to solids, liquids or pills and says she maintains current, regular diet with no restrictions. She endorses persistent symptoms of oral dryness, especially first thing in the morning and some changes to taste: water tastes metalic (drinking alkaline water, only). She describes a daily routine which includes vigorous oral care:  fluoride mouth wash, baking soda rinses, Sensodyne tooth paste and use of tongue scraper and soft bristle toothbrush; likes the biotene spray: has not filled prescription for magic mouthwash. Her weight is stable. She pre-made and froze several plant-based smoothies should she need to modify food-textures in the coming weeks. Now avoiding bread due to dryness. She has some new swelling in the front of her neck and would be interested in a lymphadema evaluation following treatment. She is also interested in attending [facility] support group. She will be working from home for the next 6 weeks. Radiation ends in  mid-July. Will schedule post-RT f/u apts with speech and head and neck surgical oncology at this time.      5/23/22: Yoana Thomas, age 27 y.o., was referred by Dr. Blanco for pre-RT assessment and counseling in anticipation of treatment of T2N1M0, group stage III, mucoepidermoid carcinoma of the left parotid s/p left parotid excision (3/21/22) and left neck dissection (4/20/22). Mouth is very dry: she endorses use of Xyl. She denies any difficulty swallowing, no trismus, no pain. She is employed in [employer], feels very  well supported at work. Employees are provided food at work, they have already said they will accommodate with smoothies and soup as necessary into her treatment. She states L facial weakness has essentially resolved, no longer biting lip. She still notices her L eyeblink is off in pictures. She was seen by dentist today, cleared for radiation and given sensitive fluoride toothpaste and rec for Biotene mouthwash. Medication and problem lists were reviewed.      Patient discussed at Tumor Board on 22, with recommendation for adjuvant RT     Oncology History   Mucoepidermoid carcinoma of salivary gland   3/21/2022 Surgery    EXCISION, PAROTID GLAND-nerve monitoring (Left)  CREATION, FLAP, MUSCLE ROTATION-SCM (Left)     2022 Initial Diagnosis    Mucoepidermoid carcinoma of salivary gland     2022 Cancer Staged    Staging form: Major Salivary Glands, AJCC 8th Edition  - Pathologic stage from 2022: Stage III (pT2, pN1, cM0)         Past Medical History:   Diagnosis Date    Anxiety     Attention deficit hyperactivity disorder (ADHD), predominantly inattentive type 2021     SURGICAL HISTORY:        Past Surgical History:   Procedure Laterality Date    CREATION OF MUSCLE ROTATIONAL FLAP Left 3/21/2022     Procedure: CREATION, FLAP, MUSCLE ROTATION-SCM;  Surgeon: Edel Hall MD;  Location: [facility] OR [address] [place] [address];  Service: ENT;  Laterality: Left;    DISSECTION OF NECK Left 2022     Procedure: DISSECTION, NECK;  Surgeon: Edel Hall MD;  Location: [facility] OR [address] [place] [address];  Service: ENT;  Laterality: Left;    EXCISION OF PAROTID GLAND Left 3/21/2022     Procedure: EXCISION, PAROTID GLAND-nerve monitoring;  Surgeon: Edel Hall MD;  Location: [facility] OR [address] [place] [address];  Service: ENT;  Laterality: Left;    KELOID EXCISION         Left ear and she also had radiation Tx    WISDOM TOOTH EXTRACTION         IMAGIN22 PET  Impression:   No definite evidence of hypermetabolic tumor.  Mild  increased uptake in the region of the left parotid gland adjacent to and surrounding surgical clips without lesion on CT correlate. Finding may represent residual inflammatory changes from recent surgery.  Recommend correlation with surgical margin status.   No hypermetabolic lymphadenopathy.     3/4/22 CT Soft Tissue Neck  Impression:   Nonspecific irregular mixed cystic and solid  superficial left parotid mass.  The lesion enhances prominently and may be somewhat vascular in nature.  Adjacent prominent intraparotid lymph nodes suggested.  Borderline in size nonspecific left upper jugular chain lymph node with smaller clustered adjacent nodes.     SWALLOW HISTORY:  No prior swallow difficulty.     FAMILY HISTORY:        Family History   Problem Relation Age of Onset    Skin cancer Father      Hypertension Father      Infertility Sister      Diabetes type I Maternal Grandmother      Heart disease Maternal Grandmother      Kidney failure Maternal Grandmother           ESRD    Diabetes type II Maternal Grandfather      Lung cancer Paternal Grandfather        Clinical Evaluation of Swallow:    Oropharyngeal swallow clinically judged to be grossly in tact: she was observed taking sips of water with no s/s aspiration; denies difficulty swallowing solids, liquids or pills.   Mild impairment of oral phase of swallow c/b dryness and subtle changes to taste associated with negative effects of RT.    Functional Oral Intake Scale:   6 - 7 Total oral intake with minimal modifications/restrictions: avoiding bread due to dryness; otherwise denies food avoidance or modification.     Eating Assessment Tool (EAT-10)  Score: 0 (5/23/2022)     COUNSELING: Yoana Thomas participated in discussion of normal swallow function vs disordered, possible negative effects of RT and therapeutic interventions for optimizing swallow function during and after RT. At this time, patient denies changes to oral mucosa, irritation, pain or  discomfort. Her primary complaint concerns AM oral dryness. Discussed how dry mouth (xerostomia) may lead to dysphagia, mucositis, loss of taste, painful swallow and texture intolerance. Recommended several OTC products and strategies to facilitate oral phase of swallow and combat xerostomia.    Food Texture and Consistency Modifications   Advised that many patients begin to experience swallow difficulties half-way through RT due to the aforementioned negative side effects. Pt may consider softer foods with added moisture should those acute symptoms arise. She may need to alter food consistencies, add moisture to foods, or supplement diet with densely-caloric, nutritional blended drinks, at some point, with the goal of weight maintenance and optimization of swallow function throughout the treatment and beyond. Should patient begin to exhibit any swallowing problems such as foods/pills sticking, signs/symptoms of aspiration, she should contact Kristel Li or Zahida Valverde.       Swallow Exercises to Combat Radiation Fibrosis   Discussed the goal of maintaining functional swallow throughout  treatment for the best possible outcome.Trained pt in effortful swallow and suggested she implement a swallow exercise routine with sets of 10 exercise swallows, 3 times daily. She was encouraged to practice swallow exercises while drinking smoothies, thick liquids or pureed consistency foods for added benefit. Extensive discussion given to importance of swallow exercises in effort to prevent or limit swallowing function problems and disuse atrophy during or after RT.    Oral Care  Reviewed suggestions for oral care and urged to continue practice frequent oral care with mouthwash and brushing teeth and tongue, particularly following meals and any items high in sugar.    Lymphadema  Discussed possible emergence of lymphadema following surgery and/or RT which can be addressed therapeutically. She reports some lymphadema in the hyoid  region of her neck. She would be interested in meeting with SLP for lymphadema evaluation after treatment.       IMPRESSION: Yoana Thomas presents with oropharyngeal swallow WNL with known dx T2N1M0, group stage III, mucoepidermoid carcinoma of the left parotid s/p left parotid excision (3/21/22) and left neck dissection (4/20/22). Patient will continue with regualr diet. She understands anticipated changes in swallow with RT. She will practice daily oral care as trained. This clinician will f/u with pt -3 weeks into RT, or sooner should he have any decline and at conclusion of RT when returning to see Dr Hood. She is in agreement with this poc.     PLAN OF CARE:  1.  Continuation of current regular consistency diet with thin liquids using trained strategies of effortful swallow, follow aspiration precautions, including, but not limited to monitoring for any signs/symptoms of aspiration (such as wet/gurgly voice that does not clear with coughing, inability to make any voice sounds, any persistent coughing with oral intake, otherwise unexplained fever, unexplained increased or new difficulty or discomfort breathing, unexplained increase in  sleepiness/lethargy/significant fatigue, unexplained increase or new onset confusion or change in cognitive functioning, or any other unexplained change in health or well-being that could be related to swallowing).  2.  Change diet consistency as necessary to facilitate safe and efficient swallow, including added moisture to foods, softer foods.  3.  Begin swallow exercises now and continue 3-4x/day through and post treatment.  4.  Practice 3-5x daily oral hygiene with dry mouth products, teeth brushing, steam, hydration, nasal saline - several OTC products were discussed and written.  5.  Monitor weight and hydration.  6. Consider Medihoney as mouth rinse daily.     Long-term goals:  Yoana Thomas will continue with oral intake of regular or slightly modified diet with  minimal to no sign/symptoms of dysphagia while maintaining weight and hydration needs.     Short-term objectives:  Pt leaves with home program to include:  1. Oral care program with steam, gargle, use of dry mouth products in effort to reduce oral dryness and reduce effects of mucositis  2. Practice daily swallow exercises to maintain swallow function through and post RT     Tobacco use: Pt is not a tobacco user.      GIANNI Hunter MS-SLP  I am in agreement with this POC, Kristel Li MS, CCC-SLP

## 2022-07-01 ENCOUNTER — HOSPITAL ENCOUNTER (OUTPATIENT)
Dept: RADIATION THERAPY | Facility: HOSPITAL | Age: 27
Discharge: HOME | End: 2022-07-01
Attending: STUDENT IN AN ORGANIZED HEALTH CARE EDUCATION/TRAINING PROGRAM
Payer: COMMERCIAL

## 2022-07-01 PROCEDURE — 77014 PR  CT GUIDANCE PLACEMENT RAD THERAPY FIELDS: CPT | Mod: 26,,, | Performed by: STUDENT IN AN ORGANIZED HEALTH CARE EDUCATION/TRAINING PROGRAM

## 2022-07-01 PROCEDURE — 77014 PR  CT GUIDANCE PLACEMENT RAD THERAPY FIELDS: ICD-10-PCS | Mod: 26,,, | Performed by: STUDENT IN AN ORGANIZED HEALTH CARE EDUCATION/TRAINING PROGRAM

## 2022-07-01 PROCEDURE — 77014 HC CT GUIDANCE RADIATION THERAPY FLDS PLACEMENT: CPT | Mod: TC | Performed by: STUDENT IN AN ORGANIZED HEALTH CARE EDUCATION/TRAINING PROGRAM

## 2022-07-01 PROCEDURE — 77386 HC IMRT, COMPLEX: CPT | Performed by: STUDENT IN AN ORGANIZED HEALTH CARE EDUCATION/TRAINING PROGRAM

## 2022-07-05 ENCOUNTER — DOCUMENTATION ONLY (OUTPATIENT)
Dept: RADIATION ONCOLOGY | Facility: CLINIC | Age: 27
End: 2022-07-05
Payer: COMMERCIAL

## 2022-07-05 PROCEDURE — 77386 HC IMRT, COMPLEX: CPT | Performed by: STUDENT IN AN ORGANIZED HEALTH CARE EDUCATION/TRAINING PROGRAM

## 2022-07-05 PROCEDURE — 77014 HC CT GUIDANCE RADIATION THERAPY FLDS PLACEMENT: CPT | Mod: TC | Performed by: STUDENT IN AN ORGANIZED HEALTH CARE EDUCATION/TRAINING PROGRAM

## 2022-07-05 PROCEDURE — 77014 PR  CT GUIDANCE PLACEMENT RAD THERAPY FIELDS: CPT | Mod: 26,,, | Performed by: STUDENT IN AN ORGANIZED HEALTH CARE EDUCATION/TRAINING PROGRAM

## 2022-07-05 PROCEDURE — 77014 PR  CT GUIDANCE PLACEMENT RAD THERAPY FIELDS: ICD-10-PCS | Mod: 26,,, | Performed by: STUDENT IN AN ORGANIZED HEALTH CARE EDUCATION/TRAINING PROGRAM

## 2022-07-06 PROCEDURE — 77014 PR  CT GUIDANCE PLACEMENT RAD THERAPY FIELDS: ICD-10-PCS | Mod: 26,,, | Performed by: STUDENT IN AN ORGANIZED HEALTH CARE EDUCATION/TRAINING PROGRAM

## 2022-07-06 PROCEDURE — 77014 PR  CT GUIDANCE PLACEMENT RAD THERAPY FIELDS: CPT | Mod: 26,,, | Performed by: STUDENT IN AN ORGANIZED HEALTH CARE EDUCATION/TRAINING PROGRAM

## 2022-07-06 PROCEDURE — 77386 HC IMRT, COMPLEX: CPT | Performed by: STUDENT IN AN ORGANIZED HEALTH CARE EDUCATION/TRAINING PROGRAM

## 2022-07-06 PROCEDURE — 77014 HC CT GUIDANCE RADIATION THERAPY FLDS PLACEMENT: CPT | Mod: TC | Performed by: STUDENT IN AN ORGANIZED HEALTH CARE EDUCATION/TRAINING PROGRAM

## 2022-07-07 ENCOUNTER — PATIENT MESSAGE (OUTPATIENT)
Dept: RADIATION ONCOLOGY | Facility: CLINIC | Age: 27
End: 2022-07-07
Payer: COMMERCIAL

## 2022-07-07 PROCEDURE — 77014 PR  CT GUIDANCE PLACEMENT RAD THERAPY FIELDS: CPT | Mod: 26,,, | Performed by: STUDENT IN AN ORGANIZED HEALTH CARE EDUCATION/TRAINING PROGRAM

## 2022-07-07 PROCEDURE — 77386 HC IMRT, COMPLEX: CPT | Performed by: STUDENT IN AN ORGANIZED HEALTH CARE EDUCATION/TRAINING PROGRAM

## 2022-07-07 PROCEDURE — 77014 PR  CT GUIDANCE PLACEMENT RAD THERAPY FIELDS: ICD-10-PCS | Mod: 26,,, | Performed by: STUDENT IN AN ORGANIZED HEALTH CARE EDUCATION/TRAINING PROGRAM

## 2022-07-07 PROCEDURE — 77014 HC CT GUIDANCE RADIATION THERAPY FLDS PLACEMENT: CPT | Mod: TC | Performed by: STUDENT IN AN ORGANIZED HEALTH CARE EDUCATION/TRAINING PROGRAM

## 2022-07-07 PROCEDURE — 77336 RADIATION PHYSICS CONSULT: CPT | Performed by: STUDENT IN AN ORGANIZED HEALTH CARE EDUCATION/TRAINING PROGRAM

## 2022-07-08 PROCEDURE — 77386 HC IMRT, COMPLEX: CPT | Performed by: STUDENT IN AN ORGANIZED HEALTH CARE EDUCATION/TRAINING PROGRAM

## 2022-07-08 PROCEDURE — 77014 HC CT GUIDANCE RADIATION THERAPY FLDS PLACEMENT: CPT | Mod: TC | Performed by: STUDENT IN AN ORGANIZED HEALTH CARE EDUCATION/TRAINING PROGRAM

## 2022-07-08 PROCEDURE — 77014 PR  CT GUIDANCE PLACEMENT RAD THERAPY FIELDS: ICD-10-PCS | Mod: 26,,, | Performed by: STUDENT IN AN ORGANIZED HEALTH CARE EDUCATION/TRAINING PROGRAM

## 2022-07-08 PROCEDURE — 77014 PR  CT GUIDANCE PLACEMENT RAD THERAPY FIELDS: CPT | Mod: 26,,, | Performed by: STUDENT IN AN ORGANIZED HEALTH CARE EDUCATION/TRAINING PROGRAM

## 2022-07-11 ENCOUNTER — DOCUMENTATION ONLY (OUTPATIENT)
Dept: RADIATION ONCOLOGY | Facility: CLINIC | Age: 27
End: 2022-07-11
Payer: COMMERCIAL

## 2022-07-11 ENCOUNTER — DOCUMENTATION ONLY (OUTPATIENT)
Dept: RADIATION THERAPY | Facility: HOSPITAL | Age: 27
End: 2022-07-11
Payer: COMMERCIAL

## 2022-07-11 PROCEDURE — 77014 HC CT GUIDANCE RADIATION THERAPY FLDS PLACEMENT: CPT | Mod: TC | Performed by: STUDENT IN AN ORGANIZED HEALTH CARE EDUCATION/TRAINING PROGRAM

## 2022-07-11 PROCEDURE — 77014 PR  CT GUIDANCE PLACEMENT RAD THERAPY FIELDS: CPT | Mod: 26,,, | Performed by: STUDENT IN AN ORGANIZED HEALTH CARE EDUCATION/TRAINING PROGRAM

## 2022-07-11 PROCEDURE — 77014 PR  CT GUIDANCE PLACEMENT RAD THERAPY FIELDS: ICD-10-PCS | Mod: 26,,, | Performed by: STUDENT IN AN ORGANIZED HEALTH CARE EDUCATION/TRAINING PROGRAM

## 2022-07-11 PROCEDURE — 77386 HC IMRT, COMPLEX: CPT | Performed by: STUDENT IN AN ORGANIZED HEALTH CARE EDUCATION/TRAINING PROGRAM

## 2022-07-11 NOTE — PROGRESS NOTES
Oncology Nutrition  Radiation Oncology Weekly Check     Treatment Week: 6 (Day 25)     Yoana Thomas   1995    Reason for Visit: Pt here for weekly visit due to high nutritional risk radiation therapy treatment (mucoepidermoid carcinoma of the left parotid)    Nutrition Assessment    Patient reports stomach upset with vomiting last week that made PO intake difficult. Home COVID test was negative and she is feeling better this week. She still finds some foods trigger nausea, particularly anything with a nutty taste. She has been eating soups, breads, crackers. Has ongoing xerostomia and mild odynophagia.       Weight:  194.7lb  Height:  62in    Usual BW: 180lb; 196lb at consult   Weight Change: about 5.5lb loss in 2 weeks     Nutrition Impact Symptoms    Dysgeusia   Xerostomia   Odynophagia   Nausea     Nutrition Recommendations    Soft diet   Higher calorie and protein soft foods: cream soups, Greek yogurt, grilled cheese, mashed avocado etc.  Fairlife protein drinks (mix with bananas or berries)     Follow up weekly during radiation    Manda Castellano, MPH, RD, , LDN, FAND   [phone]

## 2022-07-12 PROCEDURE — 77014 PR  CT GUIDANCE PLACEMENT RAD THERAPY FIELDS: CPT | Mod: 26,,, | Performed by: STUDENT IN AN ORGANIZED HEALTH CARE EDUCATION/TRAINING PROGRAM

## 2022-07-12 PROCEDURE — 77014 HC CT GUIDANCE RADIATION THERAPY FLDS PLACEMENT: CPT | Mod: TC | Performed by: STUDENT IN AN ORGANIZED HEALTH CARE EDUCATION/TRAINING PROGRAM

## 2022-07-12 PROCEDURE — 77014 PR  CT GUIDANCE PLACEMENT RAD THERAPY FIELDS: ICD-10-PCS | Mod: 26,,, | Performed by: STUDENT IN AN ORGANIZED HEALTH CARE EDUCATION/TRAINING PROGRAM

## 2022-07-12 PROCEDURE — 77386 HC IMRT, COMPLEX: CPT | Performed by: STUDENT IN AN ORGANIZED HEALTH CARE EDUCATION/TRAINING PROGRAM

## 2022-07-13 PROCEDURE — 77386 HC IMRT, COMPLEX: CPT | Performed by: STUDENT IN AN ORGANIZED HEALTH CARE EDUCATION/TRAINING PROGRAM

## 2022-07-13 PROCEDURE — 77336 RADIATION PHYSICS CONSULT: CPT | Performed by: STUDENT IN AN ORGANIZED HEALTH CARE EDUCATION/TRAINING PROGRAM

## 2022-07-13 PROCEDURE — 77014 HC CT GUIDANCE RADIATION THERAPY FLDS PLACEMENT: CPT | Mod: TC | Performed by: STUDENT IN AN ORGANIZED HEALTH CARE EDUCATION/TRAINING PROGRAM

## 2022-07-13 PROCEDURE — 77014 PR  CT GUIDANCE PLACEMENT RAD THERAPY FIELDS: ICD-10-PCS | Mod: 26,,, | Performed by: STUDENT IN AN ORGANIZED HEALTH CARE EDUCATION/TRAINING PROGRAM

## 2022-07-13 PROCEDURE — 77014 PR  CT GUIDANCE PLACEMENT RAD THERAPY FIELDS: CPT | Mod: 26,,, | Performed by: STUDENT IN AN ORGANIZED HEALTH CARE EDUCATION/TRAINING PROGRAM

## 2022-07-14 PROCEDURE — 77014 PR  CT GUIDANCE PLACEMENT RAD THERAPY FIELDS: ICD-10-PCS | Mod: 26,,, | Performed by: STUDENT IN AN ORGANIZED HEALTH CARE EDUCATION/TRAINING PROGRAM

## 2022-07-14 PROCEDURE — 77014 HC CT GUIDANCE RADIATION THERAPY FLDS PLACEMENT: CPT | Mod: TC | Performed by: STUDENT IN AN ORGANIZED HEALTH CARE EDUCATION/TRAINING PROGRAM

## 2022-07-14 PROCEDURE — 77014 PR  CT GUIDANCE PLACEMENT RAD THERAPY FIELDS: CPT | Mod: 26,,, | Performed by: STUDENT IN AN ORGANIZED HEALTH CARE EDUCATION/TRAINING PROGRAM

## 2022-07-14 PROCEDURE — 77386 HC IMRT, COMPLEX: CPT | Performed by: STUDENT IN AN ORGANIZED HEALTH CARE EDUCATION/TRAINING PROGRAM

## 2022-07-15 PROCEDURE — 77014 PR  CT GUIDANCE PLACEMENT RAD THERAPY FIELDS: CPT | Mod: 26,,, | Performed by: STUDENT IN AN ORGANIZED HEALTH CARE EDUCATION/TRAINING PROGRAM

## 2022-07-15 PROCEDURE — 77014 PR  CT GUIDANCE PLACEMENT RAD THERAPY FIELDS: ICD-10-PCS | Mod: 26,,, | Performed by: STUDENT IN AN ORGANIZED HEALTH CARE EDUCATION/TRAINING PROGRAM

## 2022-07-15 PROCEDURE — 77014 HC CT GUIDANCE RADIATION THERAPY FLDS PLACEMENT: CPT | Mod: TC | Performed by: STUDENT IN AN ORGANIZED HEALTH CARE EDUCATION/TRAINING PROGRAM

## 2022-07-15 PROCEDURE — 77386 HC IMRT, COMPLEX: CPT | Performed by: STUDENT IN AN ORGANIZED HEALTH CARE EDUCATION/TRAINING PROGRAM

## 2022-07-18 ENCOUNTER — DOCUMENTATION ONLY (OUTPATIENT)
Dept: RADIATION THERAPY | Facility: HOSPITAL | Age: 27
End: 2022-07-18
Payer: COMMERCIAL

## 2022-07-18 ENCOUNTER — DOCUMENTATION ONLY (OUTPATIENT)
Dept: RADIATION ONCOLOGY | Facility: CLINIC | Age: 27
End: 2022-07-18
Payer: COMMERCIAL

## 2022-07-18 PROCEDURE — 77386 HC IMRT, COMPLEX: CPT | Performed by: STUDENT IN AN ORGANIZED HEALTH CARE EDUCATION/TRAINING PROGRAM

## 2022-07-18 PROCEDURE — 77014 PR  CT GUIDANCE PLACEMENT RAD THERAPY FIELDS: ICD-10-PCS | Mod: 26,,, | Performed by: STUDENT IN AN ORGANIZED HEALTH CARE EDUCATION/TRAINING PROGRAM

## 2022-07-18 PROCEDURE — 77014 HC CT GUIDANCE RADIATION THERAPY FLDS PLACEMENT: CPT | Mod: TC | Performed by: STUDENT IN AN ORGANIZED HEALTH CARE EDUCATION/TRAINING PROGRAM

## 2022-07-18 PROCEDURE — 77014 PR  CT GUIDANCE PLACEMENT RAD THERAPY FIELDS: CPT | Mod: 26,,, | Performed by: STUDENT IN AN ORGANIZED HEALTH CARE EDUCATION/TRAINING PROGRAM

## 2022-07-18 NOTE — PROGRESS NOTES
"Oncology Nutrition  Radiation Oncology Weekly Check     Treatment Week: 6 (Day 30)     Yoana Thomas   1995    Reason for Visit: Pt here for weekly visit due to high nutritional risk radiation therapy treatment (mucoepidermoid carcinoma of the left parotid)    Nutrition Assessment    Patient reports ongoing issues with nausea during the day and vomiting in the evening. She is taking Zofran in the morning but forgets to take it at night. Thinks vomiting is actually from reflux. Finds she does better with bland foods and soups.  Otherwise she is doing well. She has mild odynophagia and xerostomia. Dysgeusia is worse but she has been able to find foods she can tolerate. Also reports an oil "filmy" sensation with higher fat foods.   She is drinking alkaline water and tolerating it well.     Weight:  192.9lb  Height:  62in    Usual BW: 180lb; 196lb at consult   Weight Change: about 2lb loss from last week     Nutrition Impact Symptoms    Dysgeusia   Xerostomia   Odynophagia   Nausea     Nutrition Recommendations    Soft diet   Vary textures and avoid greasy/high fat foods   Clear protein drinks (Premier protein, Ensure Clear, etc) to help with healing and proper protein intake   Ok to use Tums in evening to help with reflux  Transition back to healthy, plant based diet when nausea improves     Patient completed radiation today; will follow up as needed    Manda Castellano, MPH, RD, , LDN, FAND   [phone]              "

## 2022-07-19 PROCEDURE — 77336 RADIATION PHYSICS CONSULT: CPT | Performed by: STUDENT IN AN ORGANIZED HEALTH CARE EDUCATION/TRAINING PROGRAM

## 2022-07-31 NOTE — PROGRESS NOTES
[facility] Department of Radiation Oncology  Follow Up Visit Note    Diagnosis:  Yoana Thomas is a 27 y.o. female with T2N1M0, group stage III, mucoepidermoid carcinoma of the left parotid s/p left parotid excision (3/21/22) and left neck dissection (4/20/22). She was treated with adjuvant radiation to 60 Gy in 30 fractions to the parotid operative bed and 54 Gy in 30 fractions to the left neck, completed 7/18/22.     Oncologic History:  She has a history of a keloid of the left earlobe treated with RT in approximately 2016 in [facility]). She presented with a left neck mass.  · 2/16/22: CT neck with a 2.1cm mixed sold and cystic mass in the left superficial parotid with an additional enlarged 9 mm parotid node and prominent left neck adenopathy  · 2/16/22: FNA in clinic with atypical cells  · 3/21/22: left parotid gland excision with muscle rotation flap.  ? Path: 2.2 cm intermediate grade mucoepidermoid carcinoma with no extraparenchymal extension. - SM at 1mm, -PNI, +LVSI. 1/4 LN + for metastatic carcinoma (largest deposit 2mm), no CHARLES  · 4/8/22: PET/CT with no FDG avid tumor. Mild uptake in the left parotid gland possibly from prior surgery. No FDG avid lymphadenopathy.  · 4/20/22: left neck level II-IV dissection, with 0-29 LN+ for carcinoma  6/6/22-7/18/22Dictation #1  MRN:[id number]  [facility]:[id number]  · : 60 Gy in 30 fractions to the parotid operative bed and 54 Gy in 30 fractions to the left neck (level IB-IV).    Interval History  The patient presents today for a toxicity check.    She is doing well, taste has returned. No longer having emesis. Tolerating a full diet. She has a cough, coming from the chest, tested COVID negative. No otalgia. No facial numbness, weakness, parasthesias.    Review of Systems   ROS as above    Social History:  Social History     Tobacco Use    Smoking status: Never Smoker    Smokeless tobacco: Never Used   Substance Use Topics    Alcohol use: Yes     Comment:  "once every couple mos.     Drug use: Never       Family History:  Cancer-related family history includes Lung cancer in her paternal grandfather; Skin cancer in her father.    Exam:  Vitals:    08/01/22 1300   BP: 129/80   BP Location: Right arm   Patient Position: Sitting   Pulse: 102   Resp: 18   Temp: 97.5 °F (36.4 °C)   SpO2: 96%   Weight: 88 kg (194 lb)   Height: 5' 2" (1.575 m)     HR 87 and pulse 97% on personal check    Constitutional: Pleasant 27 y.o. female in no acute distress.  Well nourished. Well groomed.   HEENT: resolving mucositis. Incision intact with improving grade 1 dermatitis.   Lungs: No audible wheezing.  Normal effort.   Musculoskeletal: No gross MSK deformities.  Skin: No rashes appreciated.   Psych: Alert and oriented with appropriate mood and affect.  Neuro: Grossly normal.    Data Review:    Information obtained via chart review and discussion with Ms. Thomas    Assessment:  · T2N1M0, group stage III, mucoepidermoid carcinoma of the left parotid s/p left parotid excision (3/21/22) and left neck dissection (4/20/22). She was treated with adjuvant radiation to 60 Gy in 30 fractions to the parotid operative bed and 54 Gy in 30 fractions to the left neck, completed 7/18/22  · She has a history of prior left ear radiation for a keloid (unable to obtain prior records)  · Doing well, recovering well from her radiation related toxicities   ECOG: (0) Fully active, able to carry on all predisease performance without restriction    Plan:  · She will see Dr. Hall on 8/23/22.  · I will see her back with CT neck/ chest at 3 months post adjuvant radiation.    Ricky Blanco MD  Radiation Oncology        "

## 2022-08-01 ENCOUNTER — OFFICE VISIT (OUTPATIENT)
Dept: RADIATION ONCOLOGY | Facility: CLINIC | Age: 27
End: 2022-08-01
Payer: COMMERCIAL

## 2022-08-01 VITALS
HEART RATE: 102 BPM | RESPIRATION RATE: 18 BRPM | BODY MASS INDEX: 35.7 KG/M2 | TEMPERATURE: 98 F | OXYGEN SATURATION: 96 % | HEIGHT: 62 IN | WEIGHT: 194 LBS | SYSTOLIC BLOOD PRESSURE: 129 MMHG | DIASTOLIC BLOOD PRESSURE: 80 MMHG

## 2022-08-01 DIAGNOSIS — C08.9 MUCOEPIDERMOID CARCINOMA OF SALIVARY GLAND: Primary | ICD-10-CM

## 2022-08-01 DIAGNOSIS — Z92.3 HISTORY OF HEAD AND NECK RADIATION: ICD-10-CM

## 2022-08-01 PROCEDURE — 3008F BODY MASS INDEX DOCD: CPT | Mod: CPTII,S$GLB,, | Performed by: STUDENT IN AN ORGANIZED HEALTH CARE EDUCATION/TRAINING PROGRAM

## 2022-08-01 PROCEDURE — 3079F DIAST BP 80-89 MM HG: CPT | Mod: CPTII,S$GLB,, | Performed by: STUDENT IN AN ORGANIZED HEALTH CARE EDUCATION/TRAINING PROGRAM

## 2022-08-01 PROCEDURE — 1159F PR MEDICATION LIST DOCUMENTED IN MEDICAL RECORD: ICD-10-PCS | Mod: CPTII,S$GLB,, | Performed by: STUDENT IN AN ORGANIZED HEALTH CARE EDUCATION/TRAINING PROGRAM

## 2022-08-01 PROCEDURE — 3079F PR MOST RECENT DIASTOLIC BLOOD PRESSURE 80-89 MM HG: ICD-10-PCS | Mod: CPTII,S$GLB,, | Performed by: STUDENT IN AN ORGANIZED HEALTH CARE EDUCATION/TRAINING PROGRAM

## 2022-08-01 PROCEDURE — 3008F PR BODY MASS INDEX (BMI) DOCUMENTED: ICD-10-PCS | Mod: CPTII,S$GLB,, | Performed by: STUDENT IN AN ORGANIZED HEALTH CARE EDUCATION/TRAINING PROGRAM

## 2022-08-01 PROCEDURE — 99499 UNLISTED E&M SERVICE: CPT | Mod: S$GLB,,, | Performed by: STUDENT IN AN ORGANIZED HEALTH CARE EDUCATION/TRAINING PROGRAM

## 2022-08-01 PROCEDURE — 3074F PR MOST RECENT SYSTOLIC BLOOD PRESSURE < 130 MM HG: ICD-10-PCS | Mod: CPTII,S$GLB,, | Performed by: STUDENT IN AN ORGANIZED HEALTH CARE EDUCATION/TRAINING PROGRAM

## 2022-08-01 PROCEDURE — 99499 NO LOS: ICD-10-PCS | Mod: S$GLB,,, | Performed by: STUDENT IN AN ORGANIZED HEALTH CARE EDUCATION/TRAINING PROGRAM

## 2022-08-01 PROCEDURE — 1159F MED LIST DOCD IN RCRD: CPT | Mod: CPTII,S$GLB,, | Performed by: STUDENT IN AN ORGANIZED HEALTH CARE EDUCATION/TRAINING PROGRAM

## 2022-08-01 PROCEDURE — 99999 PR PBB SHADOW E&M-EST. PATIENT-LVL IV: CPT | Mod: PBBFAC,,, | Performed by: STUDENT IN AN ORGANIZED HEALTH CARE EDUCATION/TRAINING PROGRAM

## 2022-08-01 PROCEDURE — 99999 PR PBB SHADOW E&M-EST. PATIENT-LVL IV: ICD-10-PCS | Mod: PBBFAC,,, | Performed by: STUDENT IN AN ORGANIZED HEALTH CARE EDUCATION/TRAINING PROGRAM

## 2022-08-01 PROCEDURE — 3074F SYST BP LT 130 MM HG: CPT | Mod: CPTII,S$GLB,, | Performed by: STUDENT IN AN ORGANIZED HEALTH CARE EDUCATION/TRAINING PROGRAM

## 2022-08-01 NOTE — LETTER
August 1, 2022      [facility]  [address]  [facility] [address]  Phone: [phone]       Patient: Yoana Thomas   YOB: 1995  Date of Visit: 08/01/2022    To Whom It May Concern:    Yoana Thomas may return to work on 8/4/22, wherein I recommend she advance her activity as tolerated. If you have any questions or concerns, or if I can be of further assistance, please do not hesitate to contact me.    Sincerely,    Ricky Blanco Jr., MD

## 2022-08-02 ENCOUNTER — OFFICE VISIT (OUTPATIENT)
Dept: OBSTETRICS AND GYNECOLOGY | Facility: CLINIC | Age: 27
End: 2022-08-02
Payer: COMMERCIAL

## 2022-08-02 VITALS
DIASTOLIC BLOOD PRESSURE: 88 MMHG | SYSTOLIC BLOOD PRESSURE: 122 MMHG | BODY MASS INDEX: 35.48 KG/M2 | WEIGHT: 192.81 LBS | HEIGHT: 62 IN

## 2022-08-02 DIAGNOSIS — Z01.419 ROUTINE GYNECOLOGICAL EXAMINATION: Primary | ICD-10-CM

## 2022-08-02 DIAGNOSIS — Z30.014 ENCOUNTER FOR INITIAL PRESCRIPTION OF INTRAUTERINE CONTRACEPTIVE DEVICE (IUD): ICD-10-CM

## 2022-08-02 DIAGNOSIS — Z11.3 SCREEN FOR STD (SEXUALLY TRANSMITTED DISEASE): ICD-10-CM

## 2022-08-02 PROCEDURE — 88175 CYTOPATH C/V AUTO FLUID REDO: CPT | Performed by: PHYSICIAN ASSISTANT

## 2022-08-02 PROCEDURE — 1160F PR REVIEW ALL MEDS BY PRESCRIBER/CLIN PHARMACIST DOCUMENTED: ICD-10-PCS | Mod: CPTII,S$GLB,, | Performed by: PHYSICIAN ASSISTANT

## 2022-08-02 PROCEDURE — 87491 CHLMYD TRACH DNA AMP PROBE: CPT | Performed by: PHYSICIAN ASSISTANT

## 2022-08-02 PROCEDURE — 1160F RVW MEDS BY RX/DR IN RCRD: CPT | Mod: CPTII,S$GLB,, | Performed by: PHYSICIAN ASSISTANT

## 2022-08-02 PROCEDURE — 87591 N.GONORRHOEAE DNA AMP PROB: CPT | Performed by: PHYSICIAN ASSISTANT

## 2022-08-02 PROCEDURE — 99395 PR PREVENTIVE VISIT,EST,18-39: ICD-10-PCS | Mod: S$GLB,,, | Performed by: PHYSICIAN ASSISTANT

## 2022-08-02 PROCEDURE — 99999 PR PBB SHADOW E&M-EST. PATIENT-LVL III: ICD-10-PCS | Mod: PBBFAC,,, | Performed by: PHYSICIAN ASSISTANT

## 2022-08-02 PROCEDURE — 3074F PR MOST RECENT SYSTOLIC BLOOD PRESSURE < 130 MM HG: ICD-10-PCS | Mod: CPTII,S$GLB,, | Performed by: PHYSICIAN ASSISTANT

## 2022-08-02 PROCEDURE — 3079F DIAST BP 80-89 MM HG: CPT | Mod: CPTII,S$GLB,, | Performed by: PHYSICIAN ASSISTANT

## 2022-08-02 PROCEDURE — 3008F PR BODY MASS INDEX (BMI) DOCUMENTED: ICD-10-PCS | Mod: CPTII,S$GLB,, | Performed by: PHYSICIAN ASSISTANT

## 2022-08-02 PROCEDURE — 1159F PR MEDICATION LIST DOCUMENTED IN MEDICAL RECORD: ICD-10-PCS | Mod: CPTII,S$GLB,, | Performed by: PHYSICIAN ASSISTANT

## 2022-08-02 PROCEDURE — 3008F BODY MASS INDEX DOCD: CPT | Mod: CPTII,S$GLB,, | Performed by: PHYSICIAN ASSISTANT

## 2022-08-02 PROCEDURE — 99999 PR PBB SHADOW E&M-EST. PATIENT-LVL III: CPT | Mod: PBBFAC,,, | Performed by: PHYSICIAN ASSISTANT

## 2022-08-02 PROCEDURE — 1159F MED LIST DOCD IN RCRD: CPT | Mod: CPTII,S$GLB,, | Performed by: PHYSICIAN ASSISTANT

## 2022-08-02 PROCEDURE — 99395 PREV VISIT EST AGE 18-39: CPT | Mod: S$GLB,,, | Performed by: PHYSICIAN ASSISTANT

## 2022-08-02 PROCEDURE — 3079F PR MOST RECENT DIASTOLIC BLOOD PRESSURE 80-89 MM HG: ICD-10-PCS | Mod: CPTII,S$GLB,, | Performed by: PHYSICIAN ASSISTANT

## 2022-08-02 PROCEDURE — 3074F SYST BP LT 130 MM HG: CPT | Mod: CPTII,S$GLB,, | Performed by: PHYSICIAN ASSISTANT

## 2022-08-02 RX ORDER — MISOPROSTOL 200 UG/1
200 TABLET ORAL EVERY 6 HOURS
Qty: 2 TABLET | Refills: 0 | Status: SHIPPED | OUTPATIENT
Start: 2022-08-02 | End: 2022-10-19

## 2022-08-02 NOTE — PROGRESS NOTES
CC: Well woman exam    Yoana Thomas is a 27 y.o. female  presents for well woman exam.  LMP: Patient's last menstrual period was 2022.. 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 She has completed radiation and feeling much better. Energy levels are improving and ready to go back to work next week.  She started the Vitex berry supplement daily and hormonal symptoms have improved. She is sexually active and using condoms. She had increased anxiety with OCPs in the past so hesitant to try these again. Anxiety is currently well controlled with Paxil.  Sister is having fertility issues and she also worries about this.  Denies dysmenorrhea or menorrhagia.   She has not had HPV vaccine. HPV positive in the past.    Pap: 2021 negative per pt in [place] (history of HPV previously)  PCP: Abbey Leyva MD  Routine Screening Labs: 5/10/22    Past Medical History:   Diagnosis Date    Anxiety     Attention deficit hyperactivity disorder (ADHD), predominantly inattentive type 2021     Past Surgical History:   Procedure Laterality Date    CREATION OF MUSCLE ROTATIONAL FLAP Left 3/21/2022    Procedure: CREATION, FLAP, MUSCLE ROTATION-SCM;  Surgeon: Edel Hall MD;  Location: [facility] OR [address] [place] [address];  Service: ENT;  Laterality: Left;    DISSECTION OF NECK Left 2022    Procedure: DISSECTION, NECK;  Surgeon: Edel Hall MD;  Location: [facility] OR [address] [place] [address];  Service: ENT;  Laterality: Left;    EXCISION OF PAROTID GLAND Left 3/21/2022    Procedure: EXCISION, PAROTID GLAND-nerve monitoring;  Surgeon: Edel Hall MD;  Location: [facility] OR [address] [place] [address];  Service: ENT;  Laterality: Left;    KELOID EXCISION      Left ear and she also had radiation Tx    WISDOM TOOTH EXTRACTION       Social History     Socioeconomic History    Marital status: Single   Tobacco Use    Smoking status: Never Smoker  "   Smokeless tobacco: Never Used   Substance and Sexual Activity    Alcohol use: Yes     Comment: once every couple mos.     Drug use: Never    Sexual activity: Not Currently     Partners: Male     Birth control/protection: Condom     Family History   Problem Relation Age of Onset    Skin cancer Father     Hypertension Father     Infertility Sister     Diabetes type I Maternal Grandmother     Heart disease Maternal Grandmother     Kidney failure Maternal Grandmother         ESRD    Diabetes type II Maternal Grandfather     Lung cancer Paternal Grandfather      OB History        0    Para   0    Term   0       0    AB   0    Living   0       SAB   0    IAB   0    Ectopic   0    Multiple   0    Live Births   0                 Current Outpatient Medications:     paroxetine (PAXIL) 40 MG tablet, TAKE 1 TABLET BY MOUTH EVERY DAY IN THE MORNING, Disp: 90 tablet, Rfl: 1    duke's soln (benadryl 30 mL, mylanta 30 mL, LIDOcaine 30 mL, nystatin 30 mL) 120mL, Take 10 mLs by mouth 4 (four) times daily., Disp: 360 mL, Rfl: 0    hpv vaccine,9-lion (GARDASIL 9, PF,) 0.5 mL Syrg, Inject 0.5 mLs into the muscle once. For one dose. for 1 dose, Disp: 0.5 mL, Rfl: 0    miSOPROStoL (CYTOTEC) 200 MCG Tab, Take 1 tablet (200 mcg total) by mouth every 6 (six) hours. 1 PO the night before and 1 PO the morning of IUD instertion, Disp: 2 tablet, Rfl: 0    ondansetron (ZOFRAN) 4 MG tablet, Take 1 tablet (4 mg total) by mouth every 12 (twelve) hours as needed for Nausea., Disp: 30 tablet, Rfl: 1    The ASCVD Risk score (Surya ERICA Jr., et al., 2013) failed to calculate for the following reasons:    The 2013 ASCVD risk score is only valid for ages 40 to 79    /88   Ht 5' 2" (1.575 m)   Wt 87.5 kg (192 lb 12.8 oz)   LMP 2022   BMI 35.26 kg/m²       ROS:  GENERAL: Denies weight gain or weight loss. Feeling well overall.   SKIN: Denies rash or lesions.   HEAD: Denies head injury or headache.   NODES: " Denies enlarged lymph nodes.   CHEST: Denies chest pain or shortness of breath.   CARDIOVASCULAR: Denies palpitations or left sided chest pain.   ABDOMEN: No abdominal pain, constipation, diarrhea, nausea, vomiting or rectal bleeding.   URINARY: No frequency, dysuria, hematuria, or burning on urination.  REPRODUCTIVE: See HPI.   BREASTS: Denies pain, lumps, or nipple discharge.   HEMATOLOGIC: No easy bruisability or excessive bleeding.   MUSCULOSKELETAL: Denies joint pain or swelling.   NEUROLOGIC: Denies syncope or weakness.   PSYCHIATRIC: Denies depression, anxiety or mood swings.    PHYSICAL EXAM:  APPEARANCE: Well nourished, well developed, in no acute distress.  AFFECT: WNL, alert and oriented x 3  CHEST: Good respiratory effect  ABDOMEN: Soft.  No tenderness or masses.  No hepatosplenomegaly.  No hernias.  BREASTS: Symmetrical, no skin changes or visible lesions.  No palpable masses, nipple discharge bilaterally.  PELVIC: Normal external genitalia without lesions.  Normal hair distribution.  Adequate perineal body, normal urethral meatus.  Vagina moist and well rugated without lesions or discharge.  Cervix pink, without lesions, discharge or tenderness.  No significant cystocele or rectocele.  Bimanual exam shows uterus to be normal size, regular, mobile and nontender.  Adnexa without masses or tenderness.    EXTREMITIES: No edema.    ASSESSMENT:   Routine gynecological examination  -     Liquid-Based Pap Smear, Screening    Encounter for initial prescription of intrauterine contraceptive device (IUD)  -     Device Authorization Order  -     miSOPROStoL (CYTOTEC) 200 MCG Tab; Take 1 tablet (200 mcg total) by mouth every 6 (six) hours. 1 PO the night before and 1 PO the morning of IUD instertion  Dispense: 2 tablet; Refill: 0    Screen for STD (sexually transmitted disease)  -     C. trachomatis/N. gonorrhoeae by AMP DNA [facility]; Vagina      PLAN:   Pap  Reviewed benefits of HPV vaccine. She would like to start  series today.  Order for HPV vaccine.  Discussed contraception options. She would like to proceed with Kyleena IUD.  Counseled on common side effects and risks. Common to have shorter lighter periods or no periods at all. May have irregular bleeding for the first 6 months.  Kyleena order placed.  Cytotec to take prior.  GC/Chlamydia.  Follow up annually or sooner PRN.    Patient was counseled today on A.C.S. Pap guidelines and recommendations for yearly pelvic exams, mammograms and monthly self breast exams; to see her PCP for other health maintenance.

## 2022-08-03 LAB
C TRACH DNA SPEC QL NAA+PROBE: NOT DETECTED
N GONORRHOEA DNA SPEC QL NAA+PROBE: NOT DETECTED

## 2022-08-09 LAB
FINAL PATHOLOGIC DIAGNOSIS: NORMAL
Lab: NORMAL

## 2022-08-23 ENCOUNTER — PATIENT MESSAGE (OUTPATIENT)
Dept: OBSTETRICS AND GYNECOLOGY | Facility: CLINIC | Age: 27
End: 2022-08-23
Payer: COMMERCIAL

## 2022-08-23 ENCOUNTER — OFFICE VISIT (OUTPATIENT)
Dept: OTOLARYNGOLOGY | Facility: CLINIC | Age: 27
End: 2022-08-23
Payer: COMMERCIAL

## 2022-08-23 VITALS
SYSTOLIC BLOOD PRESSURE: 121 MMHG | DIASTOLIC BLOOD PRESSURE: 87 MMHG | HEART RATE: 76 BPM | WEIGHT: 192.69 LBS | BODY MASS INDEX: 35.24 KG/M2

## 2022-08-23 DIAGNOSIS — C08.9 MUCOEPIDERMOID CARCINOMA OF SALIVARY GLAND: Primary | ICD-10-CM

## 2022-08-23 DIAGNOSIS — R63.5 WEIGHT GAIN: Primary | ICD-10-CM

## 2022-08-23 DIAGNOSIS — R23.2 HOT FLASHES: ICD-10-CM

## 2022-08-23 PROCEDURE — 3074F SYST BP LT 130 MM HG: CPT | Mod: CPTII,S$GLB,, | Performed by: OTOLARYNGOLOGY

## 2022-08-23 PROCEDURE — 3008F PR BODY MASS INDEX (BMI) DOCUMENTED: ICD-10-PCS | Mod: CPTII,S$GLB,, | Performed by: OTOLARYNGOLOGY

## 2022-08-23 PROCEDURE — 99214 PR OFFICE/OUTPT VISIT, EST, LEVL IV, 30-39 MIN: ICD-10-PCS | Mod: S$GLB,,, | Performed by: OTOLARYNGOLOGY

## 2022-08-23 PROCEDURE — 1159F PR MEDICATION LIST DOCUMENTED IN MEDICAL RECORD: ICD-10-PCS | Mod: CPTII,S$GLB,, | Performed by: OTOLARYNGOLOGY

## 2022-08-23 PROCEDURE — 3008F BODY MASS INDEX DOCD: CPT | Mod: CPTII,S$GLB,, | Performed by: OTOLARYNGOLOGY

## 2022-08-23 PROCEDURE — 99999 PR PBB SHADOW E&M-EST. PATIENT-LVL III: CPT | Mod: PBBFAC,,, | Performed by: OTOLARYNGOLOGY

## 2022-08-23 PROCEDURE — 1159F MED LIST DOCD IN RCRD: CPT | Mod: CPTII,S$GLB,, | Performed by: OTOLARYNGOLOGY

## 2022-08-23 PROCEDURE — 99999 PR PBB SHADOW E&M-EST. PATIENT-LVL III: ICD-10-PCS | Mod: PBBFAC,,, | Performed by: OTOLARYNGOLOGY

## 2022-08-23 PROCEDURE — 3074F PR MOST RECENT SYSTOLIC BLOOD PRESSURE < 130 MM HG: ICD-10-PCS | Mod: CPTII,S$GLB,, | Performed by: OTOLARYNGOLOGY

## 2022-08-23 PROCEDURE — 3079F DIAST BP 80-89 MM HG: CPT | Mod: CPTII,S$GLB,, | Performed by: OTOLARYNGOLOGY

## 2022-08-23 PROCEDURE — 99214 OFFICE O/P EST MOD 30 MIN: CPT | Mod: S$GLB,,, | Performed by: OTOLARYNGOLOGY

## 2022-08-23 PROCEDURE — 3079F PR MOST RECENT DIASTOLIC BLOOD PRESSURE 80-89 MM HG: ICD-10-PCS | Mod: CPTII,S$GLB,, | Performed by: OTOLARYNGOLOGY

## 2022-08-23 NOTE — PROGRESS NOTES
Subjective:       Patient ID: Yoana Thomas is a 27 y.o. female.    Oncology History   Mucoepidermoid carcinoma of salivary gland   3/21/2022 Surgery    EXCISION, PAROTID GLAND-nerve monitoring (Left)  CREATION, FLAP, MUSCLE ROTATION-SCM (Left)     4/4/2022 Initial Diagnosis    Mucoepidermoid carcinoma of salivary gland     4/4/2022 Cancer Staged    Staging form: Major Salivary Glands, AJCC 8th Edition  - Pathologic stage from 4/4/2022: Stage III (pT2, pN1, cM0)      Radiation Therapy    Treating physician: Alli Blanco  Total Dose: 60 Gy  Fractions: 30    Treatment Summary  Course: C1 H&N 2022  Treatment Site Ref. ID Energy Dose/Fx (Gy) #Fx Dose Correction (Gy) Total Dose (Gy) Start Date End Date Elapsed Days   [facility] / 30 0 60 6/6/2022 7/18/2022 42            Chief Complaint: post op  HPI     Yoana Thomas returns for surveillance. Completed XRT 7/18/22. Doing well. Taste mostly returned. Having some residual hormonal imbalance. Mild dry cough, improving. Also with dry nasal mucosa and thickened nasal mucus.     Past Medical History:   Diagnosis Date    Anxiety     Attention deficit hyperactivity disorder (ADHD), predominantly inattentive type 6/14/2021       Past Surgical History:   Procedure Laterality Date    CREATION OF MUSCLE ROTATIONAL FLAP Left 3/21/2022    Procedure: CREATION, FLAP, MUSCLE ROTATION-SCM;  Surgeon: Edel Hall MD;  Location: [address];  Service: ENT;  Laterality: Left;    DISSECTION OF NECK Left 4/20/2022    Procedure: DISSECTION, NECK;  Surgeon: Edel Hall MD;  Location: [facility] OR [address] [place] [address];  Service: ENT;  Laterality: Left;    EXCISION OF PAROTID GLAND Left 3/21/2022    Procedure: EXCISION, PAROTID GLAND-nerve monitoring;  Surgeon: Edel Hall MD;  Location: [facility] OR [address] [place] [address];  Service: ENT;  Laterality: Left;    KELOID EXCISION      Left ear and she also had radiation Tx    WISDOM TOOTH EXTRACTION           Current  Outpatient Medications:     miSOPROStoL (CYTOTEC) 200 MCG Tab, Take 1 tablet (200 mcg total) by mouth every 6 (six) hours. 1 PO the night before and 1 PO the morning of IUD instertion, Disp: 2 tablet, Rfl: 0    paroxetine (PAXIL) 40 MG tablet, TAKE 1 TABLET BY MOUTH EVERY DAY IN THE MORNING, Disp: 90 tablet, Rfl: 1    duke's soln (benadryl 30 mL, mylanta 30 mL, LIDOcaine 30 mL, nystatin 30 mL) 120mL, Take 10 mLs by mouth 4 (four) times daily., Disp: 360 mL, Rfl: 0    ondansetron (ZOFRAN) 4 MG tablet, Take 1 tablet (4 mg total) by mouth every 12 (twelve) hours as needed for Nausea., Disp: 30 tablet, Rfl: 1    Review of patient's allergies indicates:  No Known Allergies    Social History     Socioeconomic History    Marital status: Single   Tobacco Use    Smoking status: Never Smoker    Smokeless tobacco: Never Used   Substance and Sexual Activity    Alcohol use: Yes     Comment: once every couple mos.     Drug use: Never    Sexual activity: Not Currently     Partners: Male     Birth control/protection: Condom       Family History   Problem Relation Age of Onset    Skin cancer Father     Hypertension Father     Infertility Sister     Diabetes type I Maternal Grandmother     Heart disease Maternal Grandmother     Kidney failure Maternal Grandmother         ESRD    Diabetes type II Maternal Grandfather     Lung cancer Paternal Grandfather        Review of Systems   Constitutional: Negative.    HENT: Negative.    Eyes: Negative.    Respiratory: Negative.    Cardiovascular: Negative.    Gastrointestinal: Negative.    Endocrine: Negative.    Genitourinary: Negative.    Musculoskeletal: Negative.    Integumentary:  Negative.   Allergic/Immunologic: Negative.    Neurological: Negative.    Hematological: Negative.    Psychiatric/Behavioral: Negative.          Objective:      Physical Exam  Constitutional:       Appearance: Normal appearance.   HENT:      Head: Normocephalic and atraumatic.      Right Ear:  External ear normal.      Left Ear: External ear normal.   Neck:      Comments: Modified Jose and neck dissection incision CDI.  No redness, drainage, or fluid collection noted. No parotid or neck mass to palpation.  Pulmonary:      Effort: Pulmonary effort is normal. No respiratory distress.   Neurological:      General: No focal deficit present.      Mental Status: She is alert.   Psychiatric:         Mood and Affect: Mood normal.         Behavior: Behavior normal.         Thought Content: Thought content normal.         Assessment:       Problem List Items Addressed This Visit        Oncology    Mucoepidermoid carcinoma of salivary gland - Primary          Plan:       Problem List Items Addressed This Visit        Oncology    Mucoepidermoid carcinoma of salivary gland - Primary        T2N1M0 mucoepidermoid carcinoma, intermediate grade, of the left parotid. Completed postoperative XRT 7/18/22. Doing well, HORACIO. Post treatment imaging scheduled. Recommend nasal saline for nasal dryness. Return to clinic in 6 weeks for surveillance or sooner as needed.

## 2022-08-29 ENCOUNTER — LAB VISIT (OUTPATIENT)
Dept: LAB | Facility: HOSPITAL | Age: 27
End: 2022-08-29
Attending: NURSE PRACTITIONER
Payer: COMMERCIAL

## 2022-08-29 DIAGNOSIS — R23.2 HOT FLASHES: ICD-10-CM

## 2022-08-29 DIAGNOSIS — R63.5 WEIGHT GAIN: ICD-10-CM

## 2022-08-29 LAB
T4 FREE SERPL-MCNC: 0.88 NG/DL (ref 0.71–1.51)
TSH SERPL DL<=0.005 MIU/L-ACNC: 1.11 UIU/ML (ref 0.4–4)

## 2022-08-29 PROCEDURE — 84443 ASSAY THYROID STIM HORMONE: CPT | Performed by: PHYSICIAN ASSISTANT

## 2022-08-29 PROCEDURE — 84402 ASSAY OF FREE TESTOSTERONE: CPT | Performed by: PHYSICIAN ASSISTANT

## 2022-08-29 PROCEDURE — 84439 ASSAY OF FREE THYROXINE: CPT | Performed by: PHYSICIAN ASSISTANT

## 2022-08-29 PROCEDURE — 82670 ASSAY OF TOTAL ESTRADIOL: CPT | Performed by: PHYSICIAN ASSISTANT

## 2022-08-29 PROCEDURE — 36415 COLL VENOUS BLD VENIPUNCTURE: CPT | Mod: PO | Performed by: PHYSICIAN ASSISTANT

## 2022-08-29 PROCEDURE — 83001 ASSAY OF GONADOTROPIN (FSH): CPT | Performed by: PHYSICIAN ASSISTANT

## 2022-08-31 LAB
ESTRADIOL SERPL-MCNC: 54 PG/ML
FSH SERPL-ACNC: 5.08 MIU/ML

## 2022-09-01 LAB — TESTOST FREE SERPL-MCNC: <0.4 PG/ML

## 2022-09-12 ENCOUNTER — TELEPHONE (OUTPATIENT)
Dept: INFUSION THERAPY | Facility: HOSPITAL | Age: 27
End: 2022-09-12
Payer: COMMERCIAL

## 2022-09-12 ENCOUNTER — PATIENT MESSAGE (OUTPATIENT)
Dept: OTOLARYNGOLOGY | Facility: CLINIC | Age: 27
End: 2022-09-12
Payer: COMMERCIAL

## 2022-09-12 DIAGNOSIS — F41.9 ANXIETY: Primary | ICD-10-CM

## 2022-09-12 DIAGNOSIS — C08.9 MUCOEPIDERMOID CARCINOMA OF SALIVARY GLAND: ICD-10-CM

## 2022-09-16 PROCEDURE — 99284 EMERGENCY DEPT VISIT MOD MDM: CPT | Mod: ,,, | Performed by: EMERGENCY MEDICINE

## 2022-09-16 PROCEDURE — 99285 EMERGENCY DEPT VISIT HI MDM: CPT | Mod: 25

## 2022-09-16 PROCEDURE — 99284 PR EMERGENCY DEPT VISIT,LEVEL IV: ICD-10-PCS | Mod: ,,, | Performed by: EMERGENCY MEDICINE

## 2022-09-16 PROCEDURE — 80047 BASIC METABLC PNL IONIZED CA: CPT

## 2022-09-17 ENCOUNTER — HOSPITAL ENCOUNTER (EMERGENCY)
Facility: HOSPITAL | Age: 27
Discharge: HOME | End: 2022-09-17
Attending: EMERGENCY MEDICINE
Payer: COMMERCIAL

## 2022-09-17 VITALS
DIASTOLIC BLOOD PRESSURE: 80 MMHG | OXYGEN SATURATION: 100 % | SYSTOLIC BLOOD PRESSURE: 134 MMHG | HEART RATE: 92 BPM | WEIGHT: 185 LBS | HEIGHT: 62 IN | RESPIRATION RATE: 16 BRPM | TEMPERATURE: 99 F | BODY MASS INDEX: 34.04 KG/M2

## 2022-09-17 DIAGNOSIS — M54.2 NECK DISCOMFORT: Primary | ICD-10-CM

## 2022-09-17 LAB
ALBUMIN SERPL BCP-MCNC: 3.8 G/DL (ref 3.5–5.2)
ALP SERPL-CCNC: 81 U/L (ref 55–135)
ALT SERPL W/O P-5'-P-CCNC: 15 U/L (ref 10–44)
ANION GAP SERPL CALC-SCNC: 9 MMOL/L (ref 8–16)
AST SERPL-CCNC: 13 U/L (ref 10–40)
BASOPHILS # BLD AUTO: 0.03 K/UL (ref 0–0.2)
BASOPHILS NFR BLD: 0.4 % (ref 0–1.9)
BILIRUB SERPL-MCNC: 0.2 MG/DL (ref 0.1–1)
BUN SERPL-MCNC: 16 MG/DL (ref 6–20)
BUN SERPL-MCNC: 18 MG/DL (ref 6–30)
CALCIUM SERPL-MCNC: 9.7 MG/DL (ref 8.7–10.5)
CHLORIDE SERPL-SCNC: 101 MMOL/L (ref 95–110)
CHLORIDE SERPL-SCNC: 104 MMOL/L (ref 95–110)
CO2 SERPL-SCNC: 24 MMOL/L (ref 23–29)
CREAT SERPL-MCNC: 0.7 MG/DL (ref 0.5–1.4)
CREAT SERPL-MCNC: 0.8 MG/DL (ref 0.5–1.4)
DIFFERENTIAL METHOD: ABNORMAL
EOSINOPHIL # BLD AUTO: 0.1 K/UL (ref 0–0.5)
EOSINOPHIL NFR BLD: 0.9 % (ref 0–8)
ERYTHROCYTE [DISTWIDTH] IN BLOOD BY AUTOMATED COUNT: 12.2 % (ref 11.5–14.5)
EST. GFR  (NO RACE VARIABLE): >60 ML/MIN/1.73 M^2
GLUCOSE SERPL-MCNC: 104 MG/DL (ref 70–110)
GLUCOSE SERPL-MCNC: 114 MG/DL (ref 70–110)
HCT VFR BLD AUTO: 40.4 % (ref 37–48.5)
HCT VFR BLD CALC: 41 %PCV (ref 36–54)
HCV AB SERPL QL IA: NORMAL
HGB BLD-MCNC: 14 G/DL (ref 12–16)
HIV 1+2 AB+HIV1 P24 AG SERPL QL IA: NORMAL
IMM GRANULOCYTES # BLD AUTO: 0.06 K/UL (ref 0–0.04)
IMM GRANULOCYTES NFR BLD AUTO: 0.8 % (ref 0–0.5)
LYMPHOCYTES # BLD AUTO: 0.8 K/UL (ref 1–4.8)
LYMPHOCYTES NFR BLD: 10.1 % (ref 18–48)
MCH RBC QN AUTO: 30.7 PG (ref 27–31)
MCHC RBC AUTO-ENTMCNC: 34.7 G/DL (ref 32–36)
MCV RBC AUTO: 89 FL (ref 82–98)
MONOCYTES # BLD AUTO: 0.8 K/UL (ref 0.3–1)
MONOCYTES NFR BLD: 10.1 % (ref 4–15)
NEUTROPHILS # BLD AUTO: 6 K/UL (ref 1.8–7.7)
NEUTROPHILS NFR BLD: 77.7 % (ref 38–73)
NRBC BLD-RTO: 0 /100 WBC
PLATELET # BLD AUTO: 274 K/UL (ref 150–450)
PMV BLD AUTO: 9.6 FL (ref 9.2–12.9)
POC IONIZED CALCIUM: 1.26 MMOL/L (ref 1.06–1.42)
POC TCO2 (MEASURED): 24 MMOL/L (ref 23–29)
POTASSIUM BLD-SCNC: 3.8 MMOL/L (ref 3.5–5.1)
POTASSIUM SERPL-SCNC: 3.6 MMOL/L (ref 3.5–5.1)
PROT SERPL-MCNC: 7.2 G/DL (ref 6–8.4)
RBC # BLD AUTO: 4.56 M/UL (ref 4–5.4)
SAMPLE: ABNORMAL
SODIUM BLD-SCNC: 139 MMOL/L (ref 136–145)
SODIUM SERPL-SCNC: 137 MMOL/L (ref 136–145)
WBC # BLD AUTO: 7.7 K/UL (ref 3.9–12.7)

## 2022-09-17 PROCEDURE — 25000003 PHARM REV CODE 250: Performed by: STUDENT IN AN ORGANIZED HEALTH CARE EDUCATION/TRAINING PROGRAM

## 2022-09-17 PROCEDURE — 25500020 PHARM REV CODE 255: Performed by: EMERGENCY MEDICINE

## 2022-09-17 PROCEDURE — 80053 COMPREHEN METABOLIC PANEL: CPT | Performed by: STUDENT IN AN ORGANIZED HEALTH CARE EDUCATION/TRAINING PROGRAM

## 2022-09-17 PROCEDURE — 87389 HIV-1 AG W/HIV-1&-2 AB AG IA: CPT | Performed by: PHYSICIAN ASSISTANT

## 2022-09-17 PROCEDURE — 85025 COMPLETE CBC W/AUTO DIFF WBC: CPT | Performed by: STUDENT IN AN ORGANIZED HEALTH CARE EDUCATION/TRAINING PROGRAM

## 2022-09-17 PROCEDURE — 86803 HEPATITIS C AB TEST: CPT | Performed by: PHYSICIAN ASSISTANT

## 2022-09-17 RX ORDER — ACETAMINOPHEN 500 MG
1000 TABLET ORAL
Status: COMPLETED | OUTPATIENT
Start: 2022-09-17 | End: 2022-09-17

## 2022-09-17 RX ADMIN — IOHEXOL 100 ML: 350 INJECTION, SOLUTION INTRAVENOUS at 03:09

## 2022-09-17 RX ADMIN — ACETAMINOPHEN 1000 MG: 500 TABLET ORAL at 02:09

## 2022-09-17 NOTE — ED NOTES
I-STAT Chem-8+ Results:   Value Reference Range   Sodium 139 136-145 mmol/L   Potassium  3.8 3.5-5.1 mmol/L   Chloride 101  mmol/L   Ionized Calcium 1.26 1.06-1.42 mmol/L   CO2 (measured) 24 23-29 mmol/L   Glucose 114  mg/dL   BUN 18 6-30 mg/dL   Creatinine 0.7 0.5-1.4 mg/dL   Hematocrit 41 36-54%

## 2022-09-17 NOTE — ED PROVIDER NOTES
Encounter Date: 9/16/2022       History     Chief Complaint   Patient presents with    Neck Pain     Pt reports feeling pop and rush of fluid in neck this evening. Pt had a neck dissection in April 2022 and a excision of parotid gland in March 2022. Hx of parotid cancer, just completed radiation tx.      27 y.o. female with history of parotid neoplasm status post resection, history of left-sided carotid dissection presents for left-sided neck pain and pressure that came on just prior to arrival.  Patient reports she had any some cough abnormal feeling on left side of her neck earlier today.  Patient reports associated pain and pressure that radiates down her neck.  Patient also reports some associated heaviness and discomfort of the left arm.  Patient's symptoms are constant.  There are no clear alleviating factors.    The history is provided by the patient and medical records.   Review of patient's allergies indicates:  No Known Allergies  Past Medical History:   Diagnosis Date    Anxiety     Attention deficit hyperactivity disorder (ADHD), predominantly inattentive type 6/14/2021     Past Surgical History:   Procedure Laterality Date    CREATION OF MUSCLE ROTATIONAL FLAP Left 3/21/2022    Procedure: CREATION, FLAP, MUSCLE ROTATION-SCM;  Surgeon: Edel Hall MD;  Location: [address];  Service: ENT;  Laterality: Left;    DISSECTION OF NECK Left 4/20/2022    Procedure: DISSECTION, NECK;  Surgeon: Edel Hall MD;  Location: [facility] OR [address] [place] [address];  Service: ENT;  Laterality: Left;    EXCISION OF PAROTID GLAND Left 3/21/2022    Procedure: EXCISION, PAROTID GLAND-nerve monitoring;  Surgeon: Edel Hall MD;  Location: [facility] OR [address] [place] [address];  Service: ENT;  Laterality: Left;    KELOID EXCISION      Left ear and she also had radiation Tx    WISDOM TOOTH EXTRACTION       Family History   Problem Relation Age of Onset    Skin cancer Father     Hypertension Father     Infertility Sister     Diabetes type I  Maternal Grandmother     Heart disease Maternal Grandmother     Kidney failure Maternal Grandmother         ESRD    Diabetes type II Maternal Grandfather     Lung cancer Paternal Grandfather      Social History     Tobacco Use    Smoking status: Never    Smokeless tobacco: Never   Substance Use Topics    Alcohol use: Yes     Comment: once every couple mos.     Drug use: Never     Review of Systems   Constitutional:  Negative for chills and fever.   HENT:  Negative for rhinorrhea and sore throat.    Eyes:  Negative for photophobia and visual disturbance.   Respiratory:  Negative for cough, chest tightness and shortness of breath.    Cardiovascular:  Negative for chest pain and palpitations.   Gastrointestinal:  Negative for nausea and vomiting.   Genitourinary:  Negative for difficulty urinating and dysuria.   Musculoskeletal:  Positive for neck pain. Negative for back pain and myalgias.   Skin:  Negative for pallor and rash.   Neurological:  Positive for numbness. Negative for dizziness and headaches.   Psychiatric/Behavioral:  Negative for agitation and confusion.      Physical Exam     Initial Vitals [09/16/22 2211]   BP Pulse Resp Temp SpO2   130/84 81 18 98.5 °F (36.9 °C) 98 %      MAP       --         Physical Exam    Nursing note and vitals reviewed.  Constitutional:   Alert, speaking full sentences, anxious appearing   HENT:   Head: Normocephalic and atraumatic.   Mouth/Throat: Oropharynx is clear and moist.   Eyes: Conjunctivae and EOM are normal. Pupils are equal, round, and reactive to light.   Neck:   Well-healed scar to the left neck  Without tenderness to the area below the scar  No pulsatility, no large mass   Cardiovascular:  Normal rate, regular rhythm, normal heart sounds and intact distal pulses.           Pulmonary/Chest: Breath sounds normal. No stridor. No respiratory distress.   Abdominal: Abdomen is soft. She exhibits no distension. There is no abdominal tenderness.   Musculoskeletal:          General: No edema.     Neurological: She is alert and oriented to person, place, and time.   Skin: Skin is warm and dry. No rash noted.   Psychiatric: Thought content normal.   Anxious appearing       ED Course   Procedures  Labs Reviewed   CBC W/ AUTO DIFFERENTIAL - Abnormal; Notable for the following components:       Result Value    Immature Granulocytes 0.8 (*)     Immature Grans (Abs) 0.06 (*)     Lymph # 0.8 (*)     Gran % 77.7 (*)     Lymph % 10.1 (*)     All other components within normal limits   ISTAT PROCEDURE - Abnormal; Notable for the following components:    POC Glucose 114 (*)     All other components within normal limits   HIV 1 / 2 ANTIBODY    Narrative:     Release to patient->Immediate   HEPATITIS C ANTIBODY    Narrative:     Release to patient->Immediate   COMPREHENSIVE METABOLIC PANEL          Imaging Results              CTA Head and Neck (xpd) (Final result)  Result time 09/17/22 04:23:01      Final result by Danish Díaz MD (09/17/22 04:23:01)                   Impression:      No evidence of aortic dissection or other acute vascular pathology.    Postoperative change of left parotid mucoepidermoid carcinoma resection.  Superficial thickening and heterogeneous enhancement of the gland which may be related to postoperative or radiation changes.  5 mm soft tissue density in the superficial left parotid that may represent an intraparotid lymph node, however attention on follow-up exams is recommended.    Electronically signed by resident: Chip Todd  Date:    09/17/2022  Time:    03:39    Electronically signed by: Danish Díaz MD  Date:    09/17/2022  Time:    04:23               Narrative:    EXAMINATION:  CTA HEAD AND NECK (XPD)    CLINICAL HISTORY:  Neck pain, hx. carotid dissection;    TECHNIQUE:  Axial CT images obtained throughout the region of the head before and after the administration of intravenous contrast.  CT angiogram was performed from through the cervical and  intracranial vasculature during the IV bolus administration of 75mL of Omnipaque 350.  Additional venous phase images of the neck obtained.  Multiplanar MPR and MIP reformats were performed.    Rapid AI large vessel occlusion software was used to help assess intracranial flow.    CT source data was analyzed using artificial intelligence software for detection of a large vessel occlusions (LVO) in order to enable computer assisted triage notification and aid clinical stroke decision making    COMPARISON:  PET-CT 04/08/2022; CT soft tissue neck 03/04/2022    FINDINGS:  CT:    The ventricles are normal in size without evidence of hydrocephalus.    The brain appears within normal limits. No parenchymal mass, hemorrhage, edema or major vascular distribution infarct.    No extra-axial blood or fluid collections.    The cranium appears intact. Mastoid air cells and paranasal sinuses are essentially clear.      CTA:    The aortic arch maintains a normal branching pattern.    The common and internal carotid arteries are normal in course and caliber. No significant stenosis in either carotid bifurcation.  No evidence of carotid dissection.    The vertebral origins are patent. The cervical vertebral arteries are normal in course.  Left vertebral artery is hypoplastic and terminates as the PICA.  Basilar artery is completely supplied by the right vertebral artery.  Vertebrobasilar system is within normal limits without focal abnormality.    The anterior, middle, and posterior cerebral arteries are within normal limits, without evidence of significant stenosis, focal occlusion, or intracranial aneurysm formation.    Hypoplastic left transverse and sigmoid sinuses.    Neck:    Postoperative change of previous left intraparotid lesion resection with left neck dissection, noting multiple surgical clips throughout the left cervical region.  Heterogeneous enhancement within the residual left parotid gland with no focal nodular or  masslike enhancement.  Thickening of the soft tissues about and within the superficial parotid gland which may be related to previous radiation treatment or postoperative change.  5 mm oval enhancing focus within the superficial parotid gland (series 8, image 316).  Mild edema along the left sternocleidomastoid muscle.  Surgical clips noted along the left jugular vein, which remains patent.    No enlarged cervical lymph nodes.  No soft tissue fluid collections.    Elongation of the styloid processes bilaterally measuring up to 3.8 cm in length.    The lung apices are clear.                                       Medications   acetaminophen tablet 1,000 mg (1,000 mg Oral Given 9/17/22 0220)   iohexoL (OMNIPAQUE 350) injection 100 mL (100 mLs Intravenous Given 9/17/22 0327)     Medical Decision Making:   History:   Old Medical Records: I decided to obtain old medical records.  Old Records Summarized: records from clinic visits and records from previous admission(s).  Initial Assessment:   27 y.o. female with history of parotid neoplasm status post resection, history of left-sided carotid dissection presents for left-sided neck pain and pressure   The patient most consistent with radiation related pain  Differentials include also muscle strain, less likely cellulitis, doubt carotid dissection or aneurysm  Pulse intact to bilateral upper extremities, no neurological deficits, no vision changes, no headache  No intraoral swelling, no sublingual swelling or facial swelling  No large pulsatile mass or expanding hematoma  CT ordered out of precaution to evaluate for underlying carotid dissection  Tylenol for symptoms  Independently Interpreted Test(s):   I have ordered and independently interpreted EKG Reading(s) - see prior notes  Clinical Tests:   Lab Tests: Ordered and Reviewed  Radiological Study: Ordered and Reviewed          Attending Attestation:   Physician Attestation Statement for Resident:  As the supervising MD    Physician Attestation Statement: I have personally seen and examined this patient.   I agree with the above history.  -:   As the supervising MD I agree with the above PE.     As the supervising MD I agree with the above treatment, course, plan, and disposition.   -: Pt is well-appearing, no neck TTP and no neurodeficits, etiology likely MSK however given pt's sig concern regarding prior dissection will obtain imaging.    I have reviewed and agree with the residents interpretation of the following: lab data and CT scans.  I have reviewed the following: old records at this facility.              ED Course as of 09/17/22 0740   Sat Sep 17, 2022   0434 CTA Head and Neck (xpd)  No acute findings [OK]   0450 Symptoms have improved, and patient discharged with follow-up and return precautions [OK]      ED Course User Index  [OK] Lamin Paredes MD                 Clinical Impression:   Final diagnoses:  [M54.2] Neck discomfort (Primary)      ED Disposition Condition    Discharge Stable          ED Prescriptions    None       Follow-up Information    None          Lamin Paredes MD  Resident  09/17/22 0756       Nola Marie MD  10/01/22 2428

## 2022-09-17 NOTE — DISCHARGE INSTRUCTIONS
For pain take  Ibuprofen every 6hrs as needed  Tylenol = Acetaminophen every 6hrs as needed    You can apply hot or cold packs as needed to the painful area, use them for 15 minutes at a time with breaks in between    Do not take ibuprofen, naproxen, advil, or aleve every day for more than 2-3 weeks without following up with your primary care provider, as they can cause stomach pain and other complications if used every day over long periods of time

## 2022-09-17 NOTE — ED TRIAGE NOTES
Yoana Thomas, a 27 y.o. female presents to the ED w/ complaint of     Triage note:  Chief Complaint   Patient presents with    Neck Pain     Pt reports feeling pop and rush of fluid in neck this evening. Pt had a neck dissection in April 2022 and a excision of parotid gland in March 2022. Hx of parotid cancer, just completed radiation tx.      Review of patient's allergies indicates:  No Known Allergies  Past Medical History:   Diagnosis Date    Anxiety     Attention deficit hyperactivity disorder (ADHD), predominantly inattentive type 6/14/2021

## 2022-09-19 ENCOUNTER — PATIENT MESSAGE (OUTPATIENT)
Dept: OTOLARYNGOLOGY | Facility: CLINIC | Age: 27
End: 2022-09-19
Payer: COMMERCIAL

## 2022-09-20 ENCOUNTER — OFFICE VISIT (OUTPATIENT)
Dept: OTOLARYNGOLOGY | Facility: CLINIC | Age: 27
End: 2022-09-20
Payer: COMMERCIAL

## 2022-09-20 VITALS
TEMPERATURE: 98 F | BODY MASS INDEX: 35.69 KG/M2 | SYSTOLIC BLOOD PRESSURE: 114 MMHG | WEIGHT: 195.13 LBS | DIASTOLIC BLOOD PRESSURE: 81 MMHG | HEART RATE: 74 BPM

## 2022-09-20 DIAGNOSIS — L02.11 CUTANEOUS ABSCESS OF NECK: Primary | ICD-10-CM

## 2022-09-20 PROCEDURE — 3074F SYST BP LT 130 MM HG: CPT | Mod: CPTII,S$GLB,, | Performed by: OTOLARYNGOLOGY

## 2022-09-20 PROCEDURE — 1159F PR MEDICATION LIST DOCUMENTED IN MEDICAL RECORD: ICD-10-PCS | Mod: CPTII,S$GLB,, | Performed by: OTOLARYNGOLOGY

## 2022-09-20 PROCEDURE — 99999 PR PBB SHADOW E&M-EST. PATIENT-LVL III: CPT | Mod: PBBFAC,,, | Performed by: OTOLARYNGOLOGY

## 2022-09-20 PROCEDURE — 1159F MED LIST DOCD IN RCRD: CPT | Mod: CPTII,S$GLB,, | Performed by: OTOLARYNGOLOGY

## 2022-09-20 PROCEDURE — 3079F DIAST BP 80-89 MM HG: CPT | Mod: CPTII,S$GLB,, | Performed by: OTOLARYNGOLOGY

## 2022-09-20 PROCEDURE — 99214 OFFICE O/P EST MOD 30 MIN: CPT | Mod: 25,S$GLB,, | Performed by: OTOLARYNGOLOGY

## 2022-09-20 PROCEDURE — 3074F PR MOST RECENT SYSTOLIC BLOOD PRESSURE < 130 MM HG: ICD-10-PCS | Mod: CPTII,S$GLB,, | Performed by: OTOLARYNGOLOGY

## 2022-09-20 PROCEDURE — 99214 PR OFFICE/OUTPT VISIT, EST, LEVL IV, 30-39 MIN: ICD-10-PCS | Mod: 25,S$GLB,, | Performed by: OTOLARYNGOLOGY

## 2022-09-20 PROCEDURE — 99999 PR PBB SHADOW E&M-EST. PATIENT-LVL III: ICD-10-PCS | Mod: PBBFAC,,, | Performed by: OTOLARYNGOLOGY

## 2022-09-20 PROCEDURE — 3008F BODY MASS INDEX DOCD: CPT | Mod: CPTII,S$GLB,, | Performed by: OTOLARYNGOLOGY

## 2022-09-20 PROCEDURE — 3079F PR MOST RECENT DIASTOLIC BLOOD PRESSURE 80-89 MM HG: ICD-10-PCS | Mod: CPTII,S$GLB,, | Performed by: OTOLARYNGOLOGY

## 2022-09-20 PROCEDURE — 3008F PR BODY MASS INDEX (BMI) DOCUMENTED: ICD-10-PCS | Mod: CPTII,S$GLB,, | Performed by: OTOLARYNGOLOGY

## 2022-09-20 RX ORDER — AMOXICILLIN AND CLAVULANATE POTASSIUM 875; 125 MG/1; MG/1
1 TABLET, FILM COATED ORAL EVERY 12 HOURS
Qty: 14 TABLET | Refills: 0 | Status: SHIPPED | OUTPATIENT
Start: 2022-09-20 | End: 2022-09-27

## 2022-09-20 NOTE — PROGRESS NOTES
Subjective:       Patient ID: Yoana Thomas is a 27 y.o. female.    Oncology History   Mucoepidermoid carcinoma of salivary gland   3/21/2022 Surgery    EXCISION, PAROTID GLAND-nerve monitoring (Left)  CREATION, FLAP, MUSCLE ROTATION-SCM (Left)     4/4/2022 Initial Diagnosis    Mucoepidermoid carcinoma of salivary gland     4/4/2022 Cancer Staged    Staging form: Major Salivary Glands, AJCC 8th Edition  - Pathologic stage from 4/4/2022: Stage III (pT2, pN1, cM0)        Radiation Therapy    Treating physician: Alli Blanco  Total Dose: 60 Gy  Fractions: 30    Treatment Summary  Course: C1 H&N 2022  Treatment Site Ref. ID Energy Dose/Fx (Gy) #Fx Dose Correction (Gy) Total Dose (Gy) Start Date End Date Elapsed [facility]   [facility] / 30 0 60 6/6/2022 7/18/2022 42          Chief Complaint: post op  HPI     Yoana Thomas returns for concern for fluid collection under her incision. Happened over the weekend when she felt a pop. Mildly painful. Seen in ED and CT without evidence of fluid collection. She still notes fullness under the neck portion of her incision. No pain except with manipulation. No fevers.      Past Medical History:   Diagnosis Date    Anxiety     Attention deficit hyperactivity disorder (ADHD), predominantly inattentive type 6/14/2021       Past Surgical History:   Procedure Laterality Date    CREATION OF MUSCLE ROTATIONAL FLAP Left 3/21/2022    Procedure: CREATION, FLAP, MUSCLE ROTATION-SCM;  Surgeon: Edel Hall MD;  Location: [facility] OR [address] [place] [address];  Service: ENT;  Laterality: Left;    DISSECTION OF NECK Left 4/20/2022    Procedure: DISSECTION, NECK;  Surgeon: Edel Hall MD;  Location: [facility] OR [address] [place] [address];  Service: ENT;  Laterality: Left;    EXCISION OF PAROTID GLAND Left 3/21/2022    Procedure: EXCISION, PAROTID GLAND-nerve monitoring;  Surgeon: Edel Hall MD;  Location: [facility] OR [address] [place] [address];  Service: ENT;  Laterality: Left;    KELOID EXCISION      Left ear  and she also had radiation Tx    WISDOM TOOTH EXTRACTION           Current Outpatient Medications:     paroxetine (PAXIL) 40 MG tablet, TAKE 1 TABLET BY MOUTH EVERY DAY IN THE MORNING, Disp: 90 tablet, Rfl: 1    amoxicillin-clavulanate 875-125mg (AUGMENTIN) 875-125 mg per tablet, Take 1 tablet by mouth every 12 (twelve) hours. for 7 days, Disp: 14 tablet, Rfl: 0    duke's soln (benadryl 30 mL, mylanta 30 mL, LIDOcaine 30 mL, nystatin 30 mL) 120mL, Take 10 mLs by mouth 4 (four) times daily., Disp: 360 mL, Rfl: 0    miSOPROStoL (CYTOTEC) 200 MCG Tab, Take 1 tablet (200 mcg total) by mouth every 6 (six) hours. 1 PO the night before and 1 PO the morning of IUD instertion, Disp: 2 tablet, Rfl: 0    ondansetron (ZOFRAN) 4 MG tablet, Take 1 tablet (4 mg total) by mouth every 12 (twelve) hours as needed for Nausea., Disp: 30 tablet, Rfl: 1    Review of patient's allergies indicates:  No Known Allergies    Social History     Socioeconomic History    Marital status: Single   Tobacco Use    Smoking status: Never    Smokeless tobacco: Never   Substance and Sexual Activity    Alcohol use: Yes     Comment: once every couple mos.     Drug use: Never    Sexual activity: Not Currently     Partners: Male     Birth control/protection: Condom       Family History   Problem Relation Age of Onset    Skin cancer Father     Hypertension Father     Infertility Sister     Diabetes type I Maternal Grandmother     Heart disease Maternal Grandmother     Kidney failure Maternal Grandmother         ESRD    Diabetes type II Maternal Grandfather     Lung cancer Paternal Grandfather        Review of Systems   Constitutional: Negative.    HENT: Negative.    Eyes: Negative.    Respiratory: Negative.    Cardiovascular: Negative.    Gastrointestinal: Negative.    Endocrine: Negative.    Genitourinary: Negative.    Musculoskeletal: Negative.    Integumentary:  Negative.   Allergic/Immunologic: Negative.    Neurological: Negative.    Hematological:  Negative.    Psychiatric/Behavioral: Negative.          Objective:      Physical Exam  Constitutional:       Appearance: Normal appearance.   HENT:      Head: Normocephalic and atraumatic.      Right Ear: External ear normal.      Left Ear: External ear normal.   Neck:      Comments: Modified Jose and neck dissection incision with area of fullness and mild tenderness to palpation. 0.5x2 cm  Pulmonary:      Effort: Pulmonary effort is normal. No respiratory distress.   Neurological:      General: No focal deficit present.      Mental Status: She is alert.   Psychiatric:         Mood and Affect: Mood normal.         Behavior: Behavior normal.         Thought Content: Thought content normal.     Procedure note:  After informed consent was obtained, 1cc of 1% lidocaine with epinephrine was injected adjacent to the area of fullness. Purulent fluid expressed at injection site without difficulty. An 11 blade was used to open the incision and once the purulent fluid was expressed, the wound was probed with a cotton-tip applicator without area of loculation. The area was then cleaned and a dressing placed.        Assessment:       Problem List Items Addressed This Visit    None  Visit Diagnoses       Cutaneous abscess of neck    -  Primary            Plan:       Problem List Items Addressed This Visit    None  Visit Diagnoses       Cutaneous abscess of neck    -  Primary          T2N1M0 mucoepidermoid carcinoma, intermediate grade, of the left parotid. Completed postoperative XRT 7/18/22. Stitch abscess drained today in clinic without difficulty. 7 day course of Augmentin ordered. Discussed continued wound care. Return to clinic as previously scheduled or sooner as needed. All questions were answered and she is in agreement with the plan.

## 2022-09-23 ENCOUNTER — TELEPHONE (OUTPATIENT)
Dept: OBSTETRICS AND GYNECOLOGY | Facility: CLINIC | Age: 27
End: 2022-09-23
Payer: COMMERCIAL

## 2022-09-23 NOTE — TELEPHONE ENCOUNTER
----- Message from Carisa Weiner PA-C sent at 8/2/2022  4:33 PM CDT -----  Regarding: RE:  Michael Mcclure,  This is a survivorship patient that needs the Kyleena with Dr. Apple. I have ordered the IUD and sent cytotec. No diaz! Thank you!  Carisa  ----- Message -----  From: Rubia Apple MD  Sent: 8/2/2022   3:12 PM CDT  To: Carisa Weiner PA-C  Subject: RE:                                              No problem  Can you send it to Kami so she can order it and schedule her?  Thank you!  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 No chemo. She is still having periods and sexually active. We discussed contraception and she would like the Kyleena IUD. Do you mind inserting it for her? Thanks,  Carisa

## 2022-10-04 ENCOUNTER — OFFICE VISIT (OUTPATIENT)
Dept: OTOLARYNGOLOGY | Facility: CLINIC | Age: 27
End: 2022-10-04
Payer: COMMERCIAL

## 2022-10-04 VITALS
BODY MASS INDEX: 35.85 KG/M2 | HEART RATE: 103 BPM | WEIGHT: 196 LBS | SYSTOLIC BLOOD PRESSURE: 98 MMHG | DIASTOLIC BLOOD PRESSURE: 62 MMHG

## 2022-10-04 DIAGNOSIS — C08.9 MUCOEPIDERMOID CARCINOMA OF SALIVARY GLAND: Primary | ICD-10-CM

## 2022-10-04 PROCEDURE — 99214 OFFICE O/P EST MOD 30 MIN: CPT | Mod: S$GLB,,, | Performed by: OTOLARYNGOLOGY

## 2022-10-04 PROCEDURE — 1159F MED LIST DOCD IN RCRD: CPT | Mod: CPTII,S$GLB,, | Performed by: OTOLARYNGOLOGY

## 2022-10-04 PROCEDURE — 99999 PR PBB SHADOW E&M-EST. PATIENT-LVL III: ICD-10-PCS | Mod: PBBFAC,,, | Performed by: OTOLARYNGOLOGY

## 2022-10-04 PROCEDURE — 3074F SYST BP LT 130 MM HG: CPT | Mod: CPTII,S$GLB,, | Performed by: OTOLARYNGOLOGY

## 2022-10-04 PROCEDURE — 99214 PR OFFICE/OUTPT VISIT, EST, LEVL IV, 30-39 MIN: ICD-10-PCS | Mod: S$GLB,,, | Performed by: OTOLARYNGOLOGY

## 2022-10-04 PROCEDURE — 99999 PR PBB SHADOW E&M-EST. PATIENT-LVL III: CPT | Mod: PBBFAC,,, | Performed by: OTOLARYNGOLOGY

## 2022-10-04 PROCEDURE — 3008F PR BODY MASS INDEX (BMI) DOCUMENTED: ICD-10-PCS | Mod: CPTII,S$GLB,, | Performed by: OTOLARYNGOLOGY

## 2022-10-04 PROCEDURE — 3008F BODY MASS INDEX DOCD: CPT | Mod: CPTII,S$GLB,, | Performed by: OTOLARYNGOLOGY

## 2022-10-04 PROCEDURE — 3078F PR MOST RECENT DIASTOLIC BLOOD PRESSURE < 80 MM HG: ICD-10-PCS | Mod: CPTII,S$GLB,, | Performed by: OTOLARYNGOLOGY

## 2022-10-04 PROCEDURE — 3074F PR MOST RECENT SYSTOLIC BLOOD PRESSURE < 130 MM HG: ICD-10-PCS | Mod: CPTII,S$GLB,, | Performed by: OTOLARYNGOLOGY

## 2022-10-04 PROCEDURE — 3078F DIAST BP <80 MM HG: CPT | Mod: CPTII,S$GLB,, | Performed by: OTOLARYNGOLOGY

## 2022-10-04 PROCEDURE — 1159F PR MEDICATION LIST DOCUMENTED IN MEDICAL RECORD: ICD-10-PCS | Mod: CPTII,S$GLB,, | Performed by: OTOLARYNGOLOGY

## 2022-10-04 NOTE — PROGRESS NOTES
Subjective:       Patient ID: Yoana Thomas is a 27 y.o. female.    Oncology History   Mucoepidermoid carcinoma of salivary gland   3/21/2022 Surgery    EXCISION, PAROTID GLAND-nerve monitoring (Left)  CREATION, FLAP, MUSCLE ROTATION-SCM (Left)     4/4/2022 Initial Diagnosis    Mucoepidermoid carcinoma of salivary gland     4/4/2022 Cancer Staged    Staging form: Major Salivary Glands, AJCC 8th Edition  - Pathologic stage from 4/4/2022: Stage III (pT2, pN1, cM0)        Radiation Therapy    Treating physician: Alli Blanco  Total Dose: 60 Gy  Fractions: 30    Treatment Summary  Course: C1 H&N 2022  Treatment Site Ref. ID Energy Dose/Fx (Gy) #Fx Dose Correction (Gy) Total Dose (Gy) Start Date End Date Elapsed Days   [facility] / 30 0 60 6/6/2022 7/18/2022 42          Chief Complaint: post op  HPI     Yoana Thomas returns for surveillance. Completed XRT 7/18/22. Doing well. Taste mostly returned. Stitch abscess drained at last visit, well healed. No current pain or areas of swelling or fluid collection.    Past Medical History:   Diagnosis Date    Anxiety     Attention deficit hyperactivity disorder (ADHD), predominantly inattentive type 6/14/2021       Past Surgical History:   Procedure Laterality Date    CREATION OF MUSCLE ROTATIONAL FLAP Left 3/21/2022    Procedure: CREATION, FLAP, MUSCLE ROTATION-SCM;  Surgeon: Edel Hall MD;  Location: [address];  Service: ENT;  Laterality: Left;    DISSECTION OF NECK Left 4/20/2022    Procedure: DISSECTION, NECK;  Surgeon: Edel Hall MD;  Location: [address];  Service: ENT;  Laterality: Left;    EXCISION OF PAROTID GLAND Left 3/21/2022    Procedure: EXCISION, PAROTID GLAND-nerve monitoring;  Surgeon: Edel Hall MD;  Location: [address];  Service: ENT;  Laterality: Left;    KELOID EXCISION      Left ear and she also had radiation Tx    WISDOM TOOTH EXTRACTION           Current Outpatient Medications:      duke's soln (benadryl 30 mL, mylanta 30 mL, LIDOcaine 30 mL, nystatin 30 mL) 120mL, Take 10 mLs by mouth 4 (four) times daily., Disp: 360 mL, Rfl: 0    miSOPROStoL (CYTOTEC) 200 MCG Tab, Take 1 tablet (200 mcg total) by mouth every 6 (six) hours. 1 PO the night before and 1 PO the morning of IUD instertion, Disp: 2 tablet, Rfl: 0    ondansetron (ZOFRAN) 4 MG tablet, Take 1 tablet (4 mg total) by mouth every 12 (twelve) hours as needed for Nausea., Disp: 30 tablet, Rfl: 1    paroxetine (PAXIL) 40 MG tablet, TAKE 1 TABLET BY MOUTH EVERY DAY IN THE MORNING, Disp: 90 tablet, Rfl: 1    Review of patient's allergies indicates:  No Known Allergies    Social History     Socioeconomic History    Marital status: Single   Tobacco Use    Smoking status: Never    Smokeless tobacco: Never   Substance and Sexual Activity    Alcohol use: Yes     Comment: once every couple mos.     Drug use: Never    Sexual activity: Not Currently     Partners: Male     Birth control/protection: Condom       Family History   Problem Relation Age of Onset    Skin cancer Father     Hypertension Father     Infertility Sister     Diabetes type I Maternal Grandmother     Heart disease Maternal Grandmother     Kidney failure Maternal Grandmother         ESRD    Diabetes type II Maternal Grandfather     Lung cancer Paternal Grandfather        Review of Systems   Constitutional: Negative.    HENT: Negative.    Eyes: Negative.    Respiratory: Negative.    Cardiovascular: Negative.    Gastrointestinal: Negative.    Endocrine: Negative.    Genitourinary: Negative.    Musculoskeletal: Negative.    Integumentary:  Negative.   Allergic/Immunologic: Negative.    Neurological: Negative.    Hematological: Negative.    Psychiatric/Behavioral: Negative.          Objective:      Physical Exam  Constitutional:       Appearance: Normal appearance.   HENT:      Head: Normocephalic and atraumatic.      Right Ear: External ear normal.      Left Ear: External ear normal.    Neck:      Comments: Modified Jose and neck dissection incision CDI.  No redness, drainage, or fluid collection noted. No parotid or neck mass to palpation. Previous I&D site well healed.  Pulmonary:      Effort: Pulmonary effort is normal. No respiratory distress.   Neurological:      General: No focal deficit present.      Mental Status: She is alert.   Psychiatric:         Mood and Affect: Mood normal.         Behavior: Behavior normal.         Thought Content: Thought content normal.         Assessment:       Problem List Items Addressed This Visit          Oncology    Mucoepidermoid carcinoma of salivary gland - Primary         Plan:       Problem List Items Addressed This Visit          Oncology    Mucoepidermoid carcinoma of salivary gland - Primary       T2N1M0 mucoepidermoid carcinoma, intermediate grade, of the left parotid. Completed postoperative XRT 7/18/22. Doing well, HORACIO. Post treatment imaging scheduled. Stitch abscess resolved. Return to clinic in 6 weeks for surveillance or sooner as needed.

## 2022-10-13 ENCOUNTER — HOSPITAL ENCOUNTER (OUTPATIENT)
Dept: RADIOLOGY | Facility: HOSPITAL | Age: 27
Discharge: HOME | End: 2022-10-13
Attending: STUDENT IN AN ORGANIZED HEALTH CARE EDUCATION/TRAINING PROGRAM
Payer: COMMERCIAL

## 2022-10-13 DIAGNOSIS — Z92.3 HISTORY OF HEAD AND NECK RADIATION: ICD-10-CM

## 2022-10-13 DIAGNOSIS — C08.9 MUCOEPIDERMOID CARCINOMA OF SALIVARY GLAND: ICD-10-CM

## 2022-10-13 PROCEDURE — 25500020 PHARM REV CODE 255: Performed by: STUDENT IN AN ORGANIZED HEALTH CARE EDUCATION/TRAINING PROGRAM

## 2022-10-13 PROCEDURE — 71260 CT THORAX DX C+: CPT | Mod: TC

## 2022-10-13 PROCEDURE — 71260 CT NECK CHEST WITH CONTRAST (XPD): ICD-10-PCS | Mod: 26,,, | Performed by: RADIOLOGY

## 2022-10-13 PROCEDURE — 71260 CT THORAX DX C+: CPT | Mod: 26,,, | Performed by: RADIOLOGY

## 2022-10-13 PROCEDURE — 70491 CT SOFT TISSUE NECK W/DYE: CPT | Mod: 26,,, | Performed by: RADIOLOGY

## 2022-10-13 PROCEDURE — 70491 CT NECK CHEST WITH CONTRAST (XPD): ICD-10-PCS | Mod: 26,,, | Performed by: RADIOLOGY

## 2022-10-13 RX ADMIN — IOHEXOL 100 ML: 350 INJECTION, SOLUTION INTRAVENOUS at 01:10

## 2022-10-18 NOTE — PROGRESS NOTES
[facility] Note    Chief Complaint      Chief Complaint   Patient presents with    Annual Exam       History of Present Illness      Yoana Thomas is a 27 y.o.  with Mucoepidermoid carcinoma of salivary gland Stage III, Anxiety presents to fu well visit.  Referred by Prev PCP, Dr. Heike Pimentel. 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 Castro 2/7/22. Fu by ENT, Dr. Hall. CT neck - 3/4/22. S/p FNA - consistent with parotid low-grade neoplasm. S/p parotidectomy 3/21/22 - Mucoepidermoid carcinoma of salivary gland. PET CT 4/8/22.  S/P Left Neck Dissection 4/20/22. Fu by Rad/Onc. S/p adjuvant radiation which she completed 7/18/22.  Fu by Speech Tx.   CTA neck 9/17/22 - Postoperative change of left parotid mucoepidermoid carcinoma resection.  Superficial thickening and heterogeneous enhancement of the gland which may be related to postoperative or radiation changes.  5 mm soft tissue density in the superficial left parotid that may represent an intraparotid lymph node, however attention on follow-up exams is recommended. Abscess drained 9/20/22  CT Neck 10/13/22- Postoperative changes left parotid gland mass resection with expected findings of post treatment change.  No new nodular or masslike enhancement to suggest residual/recurrent disease.  No evidence of metastatic disease.     Anxiety - Pt on Paxil 40 mg/d for the past 2-3 yrs. She is doing well on this.      ADHD - Pt not currently on meds for this.     Obesity - BMI - 36.17 - She has had a stressful year and was unable to exercise for a while due to her Surgeries.  She was also stress eating. She was 89 at beg. Of pandemic. She  plans to start going back to the gym for cardio and will avoid weight lifting. We discussed pharmacotherapy. Given RadTx to the neck I would be hesitant to start Ozempic or mounjaro due to potential for inc risk of MTC. Will try lifestyle  modification. Could consider adipex, contrave, Qsymia. She plans to cut out red meat again which helped with wt loss in past. She plans to cut back on gluten because she feels better off of it.      Lab review: 1/26/21 HA1c - 5.5;  TSH - 2.76;   HDL 51 ; BUN/Cr - 12/0.77; H/H - 14.2/41.5     HCM - Flu - 10/1/20;  Tdap - ?, 10 yrs; HPV vaccine - # 1 8/2/22 and # 2;   COVID - 19 Vaccine (Pfizer) #1 - 3/30/21; #2 - 4/23/21; 3 3 ; MGM - none;  DEXA - none;  PAP -8/2/22- neg ; Hep C Screen - 9/16/22 - neg.;  HIV Screen -  9/16/22 - neg.; C-scope - none;   Prev PCP - Dr. Heike Pimentel; Prev OB/GYN - in [place]      Patient Care Team:  Abbey Leyva MD as PCP - General (Internal Medicine)     Health Maintenance:  Immunization History   Administered Date(s) Administered    COVID-19 Vaccine 04/01/2021    COVID-19, MRNA, LN-S, PF (Pfizer) (Purple Cap) 03/30/2021, 04/23/2021    HPV 9-Valent 08/02/2022, 10/19/2022    Influenza - Quadrivalent 10/01/2020    Influenza - Quadrivalent - PF *Preferred* (6 months and older) 10/19/2022      Health Maintenance   Topic Date Due    TETANUS VACCINE  Never done    Pap Smear  08/02/2025    Hepatitis C Screening  Completed    Lipid Panel  Completed        Past Medical History:  Past Medical History:   Diagnosis Date    Anxiety     Attention deficit hyperactivity disorder (ADHD), predominantly inattentive type 06/14/2021    Hx of psychiatric care     Therapy        Past Surgical History:   has a past surgical history that includes Keloid excision; [place] tooth extraction; Excision of parotid gland (Left, 3/21/2022); Creation of muscle rotational flap (Left, 3/21/2022); and Dissection of neck (Left, 4/20/2022).    Family History:  family history includes Diabetes type I in her maternal grandmother; Diabetes type II in her maternal grandfather; Heart disease in her maternal grandmother; Hypertension in her father; Infertility in her sister; Kidney failure in her maternal  "grandmother; Lymphoma in her paternal grandfather; Skin cancer in her father.     Social History:  Social History     Tobacco Use    Smoking status: Never    Smokeless tobacco: Never   Substance Use Topics    Alcohol use: Yes     Comment: once every couple mos.     Drug use: Never       Review of Systems   Constitutional:  Negative for activity change, chills, diaphoresis and unexpected weight change.   HENT:  Positive for postnasal drip. Negative for hearing loss, rhinorrhea and trouble swallowing.    Eyes:  Negative for discharge and visual disturbance.   Respiratory:  Positive for cough. Negative for chest tightness, shortness of breath and wheezing.         Chronic cough   Cardiovascular:  Negative for chest pain and palpitations.   Gastrointestinal:  Negative for blood in stool, constipation, diarrhea, vomiting and reflux.   Endocrine: Negative for polydipsia and polyuria.   Genitourinary:  Negative for difficulty urinating, dysuria, hematuria and menstrual problem.   Musculoskeletal:  Negative for arthralgias, joint swelling and neck pain.   Neurological:  Negative for weakness and headaches.   Psychiatric/Behavioral:  Negative for confusion and dysphoric mood.       Medications:    Current Outpatient Medications:     flu vacc [id number] 6mos up,PF, 60 mcg (15 mcg x 4)/0.5 mL Syrg, Inject 0.5 mLs into the muscle once. for 1 dose, Disp: 0.5 mL, Rfl: 0    hpv vaccine,9-lion (GARDASIL 9, PF,) 0.5 mL Syrg, Inject 0.5 mLs into the muscle once. For one dose. for 1 dose, Disp: 0.5 mL, Rfl: 0    paroxetine (PAXIL) 40 MG tablet, Take 1 tablet (40 mg total) by mouth once daily., Disp: 90 tablet, Rfl: 3     Allergies:  Review of patient's allergies indicates:  No Known Allergies    Physical Exam      Vital Signs  Temp: 99 °F (37.2 °C)  Temp src: Oral  Resp: 18  SpO2: 99 %  BP: 100/60  BP Location: Left arm  Patient Position: Sitting  Pain Score: 0-No pain  Height and Weight  Height: 5' 2" (157.5 cm)  Weight: 89.7 kg (197 " lb 12 oz)  BSA (Calculated - sq m): 1.98 sq meters  BMI (Calculated): 36.2  Weight in (lb) to have BMI = 25: 136.4      Patient Position: Sitting      Physical Exam  Vitals reviewed.   Constitutional:       General: She is not in acute distress.     Appearance: Normal appearance. She is not ill-appearing, toxic-appearing or diaphoretic.   HENT:      Head: Normocephalic and atraumatic.      Right Ear: Tympanic membrane normal.      Left Ear: Tympanic membrane normal.      Mouth/Throat:      Mouth: Mucous membranes are moist.   Eyes:      Extraocular Movements: Extraocular movements intact.      Conjunctiva/sclera: Conjunctivae normal.      Pupils: Pupils are equal, round, and reactive to light.   Neck:      Vascular: No carotid bruit.      Comments: Well healed surg scar to Left neck and preauricular region  Cardiovascular:      Rate and Rhythm: Normal rate and regular rhythm.      Pulses: Normal pulses.      Heart sounds: Normal heart sounds. No murmur heard.  Pulmonary:      Effort: No respiratory distress.      Breath sounds: Normal breath sounds. No wheezing.   Abdominal:      General: Bowel sounds are normal. There is no distension.      Palpations: Abdomen is soft.      Tenderness: There is no abdominal tenderness. There is no guarding or rebound.   Lymphadenopathy:      Head:      Right side of head: No submental, submandibular, tonsillar, preauricular, posterior auricular or occipital adenopathy.      Left side of head: No submental, submandibular, tonsillar, preauricular, posterior auricular or occipital adenopathy.      Cervical:      Right cervical: No superficial cervical adenopathy.     Left cervical: No superficial cervical adenopathy.      Upper Body:      Right upper body: No supraclavicular adenopathy.      Left upper body: No supraclavicular adenopathy.   Skin:     General: Skin is warm and dry.   Neurological:      General: No focal deficit present.      Mental Status: She is alert and oriented to  person, place, and time.   Psychiatric:         Mood and Affect: Mood normal.         Behavior: Behavior normal.        Laboratory:  CBC:  Recent Labs   Lab 02/07/22  1620 09/17/22  0210 09/17/22  0220   WBC 8.87 7.70  --    RBC 4.90 4.56  --    Hemoglobin 14.2 14.0  --    POC Hematocrit  --   --  41   Hematocrit 43.6 40.4  --    Platelets 306 274  --    MCV 89 89  --    MCH 29.0 30.7  --    MCHC 32.6 34.7  --        CMP:  Recent Labs   Lab 09/17/22  0210   Glucose 104   Calcium 9.7   Albumin 3.8   Total Protein 7.2   Sodium 137   Potassium 3.6   CO2 24   Chloride 104   BUN 16   Creatinine 0.8   Alkaline Phosphatase 81   ALT 15   AST 13   Total Bilirubin 0.2           LIPIDS:  Recent Labs   Lab 08/29/22  1345 10/19/22  0842   TSH 1.112  --    HDL  --  59   Cholesterol  --  218 H   Triglycerides  --  100   LDL Cholesterol  --  139.0   HDL/Cholesterol Ratio  --  27.1   Non-HDL Cholesterol  --  159   Total Cholesterol/HDL Ratio  --  3.7       TSH:  Recent Labs   Lab 08/29/22  1345   TSH 1.112       Other:   Recent Labs   Lab 08/29/22  1345   Follicle Stimulating Hormone 5.08   Estradiol 54     Recent Labs   Lab 09/17/22  0210   Hepatitis C Ab Non-reactive       Assessment/Plan     Yoana Thomas is a 27 y.o.female with:    Normal physical exam, routine  - Performed today.  Will check FLP.  RTC in 1 yr for fu or sooner if needed    Anxiety  -     paroxetine (PAXIL) 40 MG tablet; Take 1 tablet (40 mg total) by mouth once daily.  Dispense: 90 tablet; Refill: 3  - Controlled.  Cont current.     Mucoepidermoid carcinoma of salivary gland  - Stable.  Cont current regimen.    Class 2 obesity due to excess calories with body mass index (BMI) of 36.0 to 36.9 in adult, unspecified whether serious comorbidity present  - Rec diet and exercise as discussed for wt loss.  Can consider Pharmacotherapy in future.     Screening for lipoid disorders  -     Lipid Panel; Future; Expected date: 10/19/2022       Chronic conditions  status updated as per HPI.  Other than changes above, cont current medications and maintain follow up with specialists.  Follow up for fu chronic issues or sooner if needed.      Abbey Leyva MD  [facility]

## 2022-10-19 ENCOUNTER — PATIENT MESSAGE (OUTPATIENT)
Dept: PSYCHIATRY | Facility: CLINIC | Age: 27
End: 2022-10-19
Payer: COMMERCIAL

## 2022-10-19 ENCOUNTER — OFFICE VISIT (OUTPATIENT)
Dept: PSYCHIATRY | Facility: CLINIC | Age: 27
End: 2022-10-19
Payer: COMMERCIAL

## 2022-10-19 ENCOUNTER — LAB VISIT (OUTPATIENT)
Dept: LAB | Facility: HOSPITAL | Age: 27
End: 2022-10-19
Attending: INTERNAL MEDICINE
Payer: COMMERCIAL

## 2022-10-19 ENCOUNTER — OFFICE VISIT (OUTPATIENT)
Dept: PRIMARY CARE CLINIC | Facility: CLINIC | Age: 27
End: 2022-10-19
Payer: COMMERCIAL

## 2022-10-19 VITALS
BODY MASS INDEX: 36.39 KG/M2 | OXYGEN SATURATION: 99 % | WEIGHT: 197.75 LBS | HEIGHT: 62 IN | SYSTOLIC BLOOD PRESSURE: 100 MMHG | TEMPERATURE: 99 F | DIASTOLIC BLOOD PRESSURE: 60 MMHG | RESPIRATION RATE: 18 BRPM

## 2022-10-19 DIAGNOSIS — F41.9 ANXIETY: ICD-10-CM

## 2022-10-19 DIAGNOSIS — C08.9 MUCOEPIDERMOID CARCINOMA OF SALIVARY GLAND: ICD-10-CM

## 2022-10-19 DIAGNOSIS — E66.09 CLASS 2 OBESITY DUE TO EXCESS CALORIES WITH BODY MASS INDEX (BMI) OF 36.0 TO 36.9 IN ADULT, UNSPECIFIED WHETHER SERIOUS COMORBIDITY PRESENT: ICD-10-CM

## 2022-10-19 DIAGNOSIS — Z00.00 NORMAL PHYSICAL EXAM, ROUTINE: Primary | ICD-10-CM

## 2022-10-19 DIAGNOSIS — Z13.220 SCREENING FOR LIPOID DISORDERS: ICD-10-CM

## 2022-10-19 DIAGNOSIS — F41.1 GENERALIZED ANXIETY DISORDER: Primary | ICD-10-CM

## 2022-10-19 LAB
CHOLEST SERPL-MCNC: 218 MG/DL (ref 120–199)
CHOLEST/HDLC SERPL: 3.7 {RATIO} (ref 2–5)
HDLC SERPL-MCNC: 59 MG/DL (ref 40–75)
HDLC SERPL: 27.1 % (ref 20–50)
LDLC SERPL CALC-MCNC: 139 MG/DL (ref 63–159)
NONHDLC SERPL-MCNC: 159 MG/DL
TRIGL SERPL-MCNC: 100 MG/DL (ref 30–150)

## 2022-10-19 PROCEDURE — 90791 PR PSYCHIATRIC DIAGNOSTIC EVALUATION: ICD-10-PCS | Mod: S$GLB,,, | Performed by: PSYCHOLOGIST

## 2022-10-19 PROCEDURE — 99999 PR PBB SHADOW E&M-EST. PATIENT-LVL IV: CPT | Mod: PBBFAC,,, | Performed by: INTERNAL MEDICINE

## 2022-10-19 PROCEDURE — 3078F DIAST BP <80 MM HG: CPT | Mod: CPTII,S$GLB,, | Performed by: INTERNAL MEDICINE

## 2022-10-19 PROCEDURE — 80061 LIPID PANEL: CPT | Performed by: INTERNAL MEDICINE

## 2022-10-19 PROCEDURE — 36415 COLL VENOUS BLD VENIPUNCTURE: CPT | Mod: PN | Performed by: INTERNAL MEDICINE

## 2022-10-19 PROCEDURE — 3074F PR MOST RECENT SYSTOLIC BLOOD PRESSURE < 130 MM HG: ICD-10-PCS | Mod: CPTII,S$GLB,, | Performed by: INTERNAL MEDICINE

## 2022-10-19 PROCEDURE — 99395 PREV VISIT EST AGE 18-39: CPT | Mod: S$GLB,,, | Performed by: INTERNAL MEDICINE

## 2022-10-19 PROCEDURE — 99999 PR PBB SHADOW E&M-EST. PATIENT-LVL IV: ICD-10-PCS | Mod: PBBFAC,,, | Performed by: INTERNAL MEDICINE

## 2022-10-19 PROCEDURE — 3074F SYST BP LT 130 MM HG: CPT | Mod: CPTII,S$GLB,, | Performed by: INTERNAL MEDICINE

## 2022-10-19 PROCEDURE — 90791 PSYCH DIAGNOSTIC EVALUATION: CPT | Mod: S$GLB,,, | Performed by: PSYCHOLOGIST

## 2022-10-19 PROCEDURE — 3078F PR MOST RECENT DIASTOLIC BLOOD PRESSURE < 80 MM HG: ICD-10-PCS | Mod: CPTII,S$GLB,, | Performed by: INTERNAL MEDICINE

## 2022-10-19 PROCEDURE — 99999 PR PBB SHADOW E&M-EST. PATIENT-LVL II: ICD-10-PCS | Mod: PBBFAC,,, | Performed by: PSYCHOLOGIST

## 2022-10-19 PROCEDURE — 99395 PR PREVENTIVE VISIT,EST,18-39: ICD-10-PCS | Mod: S$GLB,,, | Performed by: INTERNAL MEDICINE

## 2022-10-19 PROCEDURE — 99999 PR PBB SHADOW E&M-EST. PATIENT-LVL II: CPT | Mod: PBBFAC,,, | Performed by: PSYCHOLOGIST

## 2022-10-19 RX ORDER — PAROXETINE HYDROCHLORIDE 40 MG/1
40 TABLET, FILM COATED ORAL DAILY
Qty: 90 TABLET | Refills: 3 | Status: SHIPPED | OUTPATIENT
Start: 2022-10-19 | End: 2023-10-25 | Stop reason: SDUPTHER

## 2022-10-19 NOTE — PROGRESS NOTES
[facility]  Follow Up Visit Note    Diagnosis:  Yoana Thomas is a 27 y.o. female with T2N1M0, group stage III, mucoepidermoid carcinoma of the left parotid s/p left parotid excision (3/21/22) and left neck dissection (4/20/22). She was treated with adjuvant radiation to 60 Gy in 30 fractions to the parotid operative bed and 54 Gy in 30 fractions to the left neck, completed 7/18/22.     Oncologic History:  She has a history of a keloid of the left earlobe treated with RT in approximately 2016 in [facility]). She presented with a left neck mass.  2/16/22: CT neck with a 2.1cm mixed sold and cystic mass in the left superficial parotid with an additional enlarged 9 mm parotid node and prominent left neck adenopathy  2/16/22: FNA in clinic with atypical cells  3/21/22: left parotid gland excision with muscle rotation flap.  Path: 2.2 cm intermediate grade mucoepidermoid carcinoma with no extraparenchymal extension. - SM at 1mm, -PNI, +LVSI. 1/4 LN + for metastatic carcinoma (largest deposit 2mm), no CHARLES  4/8/22: PET/CT with no FDG avid tumor. Mild uptake in the left parotid gland possibly from prior surgery. No FDG avid lymphadenopathy.  4/20/22: left neck level II-IV dissection, with 0-29 LN+ for carcinoma  6/6/22-7/18/22: 60 Gy in 30 fractions to the parotid operativebed and 54 Gy in 30 fractions to the left neck (level IB-IV).    Interval History  The patient presents today with post treatment imaging. She underwent post treatment CT neck and chest on 10/13/22, with post treatment changes in the left parotid resection bed. No evidence of residual or recurrent disease, no metastatic disease.     She met with Dr. Hall on 10/4 with HORACIO on exam.    She reports mild xerostomia, but tolerating a full diet with some limitations of bread. No otalgia. Facial numbness or weakness (other than numbness in her incision site since surgery), no hearing loss. Facial strength is  "improving since surgery. No hemoptysis, voice changes.     Review of Systems   ROS as above    Social History:  Social History     Tobacco Use    Smoking status: Never    Smokeless tobacco: Never   Substance Use Topics    Alcohol use: Yes     Comment: once every couple mos.     Drug use: Never       Family History:  Cancer-related family history includes Lymphoma in her paternal grandfather; Skin cancer in her father.    Exam:  Vitals:    10/20/22 0834   BP: 111/74   BP Location: Left arm   Patient Position: Sitting   Pulse: 82   Resp: 17   Temp: 97.6 °F (36.4 °C)   SpO2: 96%   Weight: 87.5 kg (193 lb 0.2 oz)   Height: 5' 2" (1.575 m)       HR 87 and pulse 97% on personal check    Constitutional: Pleasant 27 y.o. female in no acute distress.  Well nourished. Well groomed.   HEENT: no facial asymmetry, sensation intact in V1-3 (other than over incisions). No appreciated OC or OPX scans.   Lymph: no appreciated pre-auricular or cervical adenopathy  Lungs: No audible wheezing.  Normal effort.   Musculoskeletal: No gross MSK deformities.  Skin: No rashes appreciated.   Psych: Alert and oriented with appropriate mood and affect.  Neuro: Grossly normal.    Data Review:    Information obtained via chart review and discussion with Ms. Thomas    CT neck 10/13/22 was personally reviewed with Ms. Thomas    Assessment:  T2N1M0, group stage III, mucoepidermoid carcinoma of the left parotid s/p left parotid excision (3/21/22) and left neck dissection (4/20/22). She was treated with adjuvant radiation to 60 Gy in 30 fractions to the parotid operative bed and 54 Gy in 30 fractions to the left neck, completed 7/18/22  She has a history of prior left ear radiation for a keloid (unable to obtain prior records)  Doing well, with HORACIO on exam and imaging   Subcm right lung nodule  ECOG: (0) Fully active, able to carry on all predisease performance without restriction    Plan:  RTC 3 months for continued surveillance  TSH WNL 8/29/22. " Recommend she check this every 6 months, can be with me or PCP  CT chest 6 months to ensure stability of right lung nodule  Continue routine dental cleaning, fluoride toothpaste    Ricky Blanco MD  Radiation Oncology
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
## 2022-10-19 NOTE — PROGRESS NOTES
I sent pt a my chart message -  I reviewed your labs.  Your cholesterol was borderline high. You do not require medication for this at this time but I do recommend you follow a low cholesterol diet and exercise.  You can visit the American heart association website at heart.org for further info on a low cholesterol diet.     Dr. GRIFFITHS

## 2022-10-20 ENCOUNTER — OFFICE VISIT (OUTPATIENT)
Dept: RADIATION ONCOLOGY | Facility: CLINIC | Age: 27
End: 2022-10-20
Payer: COMMERCIAL

## 2022-10-20 VITALS
BODY MASS INDEX: 35.51 KG/M2 | TEMPERATURE: 98 F | HEART RATE: 82 BPM | RESPIRATION RATE: 17 BRPM | DIASTOLIC BLOOD PRESSURE: 74 MMHG | OXYGEN SATURATION: 96 % | SYSTOLIC BLOOD PRESSURE: 111 MMHG | HEIGHT: 62 IN | WEIGHT: 193 LBS

## 2022-10-20 DIAGNOSIS — R91.1 SOLITARY PULMONARY NODULE: Primary | ICD-10-CM

## 2022-10-20 PROCEDURE — 3078F PR MOST RECENT DIASTOLIC BLOOD PRESSURE < 80 MM HG: ICD-10-PCS | Mod: CPTII,S$GLB,, | Performed by: STUDENT IN AN ORGANIZED HEALTH CARE EDUCATION/TRAINING PROGRAM

## 2022-10-20 PROCEDURE — 99213 OFFICE O/P EST LOW 20 MIN: CPT | Mod: S$GLB,,, | Performed by: STUDENT IN AN ORGANIZED HEALTH CARE EDUCATION/TRAINING PROGRAM

## 2022-10-20 PROCEDURE — 99213 PR OFFICE/OUTPT VISIT, EST, LEVL III, 20-29 MIN: ICD-10-PCS | Mod: S$GLB,,, | Performed by: STUDENT IN AN ORGANIZED HEALTH CARE EDUCATION/TRAINING PROGRAM

## 2022-10-20 PROCEDURE — 3074F PR MOST RECENT SYSTOLIC BLOOD PRESSURE < 130 MM HG: ICD-10-PCS | Mod: CPTII,S$GLB,, | Performed by: STUDENT IN AN ORGANIZED HEALTH CARE EDUCATION/TRAINING PROGRAM

## 2022-10-20 PROCEDURE — 99999 PR PBB SHADOW E&M-EST. PATIENT-LVL III: CPT | Mod: PBBFAC,,, | Performed by: STUDENT IN AN ORGANIZED HEALTH CARE EDUCATION/TRAINING PROGRAM

## 2022-10-20 PROCEDURE — 1159F MED LIST DOCD IN RCRD: CPT | Mod: CPTII,S$GLB,, | Performed by: STUDENT IN AN ORGANIZED HEALTH CARE EDUCATION/TRAINING PROGRAM

## 2022-10-20 PROCEDURE — 99999 PR PBB SHADOW E&M-EST. PATIENT-LVL III: ICD-10-PCS | Mod: PBBFAC,,, | Performed by: STUDENT IN AN ORGANIZED HEALTH CARE EDUCATION/TRAINING PROGRAM

## 2022-10-20 PROCEDURE — 3078F DIAST BP <80 MM HG: CPT | Mod: CPTII,S$GLB,, | Performed by: STUDENT IN AN ORGANIZED HEALTH CARE EDUCATION/TRAINING PROGRAM

## 2022-10-20 PROCEDURE — 3074F SYST BP LT 130 MM HG: CPT | Mod: CPTII,S$GLB,, | Performed by: STUDENT IN AN ORGANIZED HEALTH CARE EDUCATION/TRAINING PROGRAM

## 2022-10-20 PROCEDURE — 1159F PR MEDICATION LIST DOCUMENTED IN MEDICAL RECORD: ICD-10-PCS | Mod: CPTII,S$GLB,, | Performed by: STUDENT IN AN ORGANIZED HEALTH CARE EDUCATION/TRAINING PROGRAM

## 2022-11-15 ENCOUNTER — OFFICE VISIT (OUTPATIENT)
Dept: OTOLARYNGOLOGY | Facility: CLINIC | Age: 27
End: 2022-11-15
Payer: COMMERCIAL

## 2022-11-15 VITALS
DIASTOLIC BLOOD PRESSURE: 75 MMHG | HEART RATE: 74 BPM | BODY MASS INDEX: 35.32 KG/M2 | WEIGHT: 193.13 LBS | SYSTOLIC BLOOD PRESSURE: 109 MMHG

## 2022-11-15 DIAGNOSIS — C08.9 MUCOEPIDERMOID CARCINOMA OF SALIVARY GLAND: Primary | ICD-10-CM

## 2022-11-15 PROCEDURE — 99999 PR PBB SHADOW E&M-EST. PATIENT-LVL III: CPT | Mod: PBBFAC,,, | Performed by: OTOLARYNGOLOGY

## 2022-11-15 PROCEDURE — 3074F PR MOST RECENT SYSTOLIC BLOOD PRESSURE < 130 MM HG: ICD-10-PCS | Mod: CPTII,S$GLB,, | Performed by: OTOLARYNGOLOGY

## 2022-11-15 PROCEDURE — 99214 PR OFFICE/OUTPT VISIT, EST, LEVL IV, 30-39 MIN: ICD-10-PCS | Mod: S$GLB,,, | Performed by: OTOLARYNGOLOGY

## 2022-11-15 PROCEDURE — 3074F SYST BP LT 130 MM HG: CPT | Mod: CPTII,S$GLB,, | Performed by: OTOLARYNGOLOGY

## 2022-11-15 PROCEDURE — 99214 OFFICE O/P EST MOD 30 MIN: CPT | Mod: S$GLB,,, | Performed by: OTOLARYNGOLOGY

## 2022-11-15 PROCEDURE — 3008F PR BODY MASS INDEX (BMI) DOCUMENTED: ICD-10-PCS | Mod: CPTII,S$GLB,, | Performed by: OTOLARYNGOLOGY

## 2022-11-15 PROCEDURE — 99999 PR PBB SHADOW E&M-EST. PATIENT-LVL III: ICD-10-PCS | Mod: PBBFAC,,, | Performed by: OTOLARYNGOLOGY

## 2022-11-15 PROCEDURE — 3078F DIAST BP <80 MM HG: CPT | Mod: CPTII,S$GLB,, | Performed by: OTOLARYNGOLOGY

## 2022-11-15 PROCEDURE — 3078F PR MOST RECENT DIASTOLIC BLOOD PRESSURE < 80 MM HG: ICD-10-PCS | Mod: CPTII,S$GLB,, | Performed by: OTOLARYNGOLOGY

## 2022-11-15 PROCEDURE — 3008F BODY MASS INDEX DOCD: CPT | Mod: CPTII,S$GLB,, | Performed by: OTOLARYNGOLOGY

## 2022-11-15 NOTE — PROGRESS NOTES
Subjective:       Patient ID: Yoana Thomas is a 27 y.o. female.    Oncology History   Mucoepidermoid carcinoma of salivary gland   3/21/2022 Surgery    EXCISION, PAROTID GLAND-nerve monitoring (Left)  CREATION, FLAP, MUSCLE ROTATION-SCM (Left)     4/4/2022 Initial Diagnosis    Mucoepidermoid carcinoma of salivary gland     4/4/2022 Cancer Staged    Staging form: Major Salivary Glands, AJCC 8th Edition  - Pathologic stage from 4/4/2022: Stage III (pT2, pN1, cM0)        Radiation Therapy    Treating physician: Alli Blanco  Total Dose: 60 Gy  Fractions: 30    Treatment Summary  Course: C1 H&N 2022  Treatment Site Ref. ID Energy Dose/Fx (Gy) #Fx Dose Correction (Gy) Total Dose (Gy) Start Date End Date Elapsed Days   [facility] / 30 0 60 6/6/2022 7/18/2022 42          Chief Complaint: post op  HPI     Yoana Thomas returns for surveillance. Completed XRT 7/18/22. Doing well. Taste mostly returned. Stitch abscess drained at previous visit, now with some minimal drainage at the site. No current pain or areas of swelling or fluid collection.    Past Medical History:   Diagnosis Date    Anxiety     Attention deficit hyperactivity disorder (ADHD), predominantly inattentive type 06/14/2021    Hx of psychiatric care     Therapy        Past Surgical History:   Procedure Laterality Date    CREATION OF MUSCLE ROTATIONAL FLAP Left 3/21/2022    Procedure: CREATION, FLAP, MUSCLE ROTATION-SCM;  Surgeon: Edel Hall MD;  Location: [facility] OR [address] [place] [address];  Service: ENT;  Laterality: Left;    DISSECTION OF NECK Left 4/20/2022    Procedure: DISSECTION, NECK;  Surgeon: Edel Hall MD;  Location: [facility] OR [address] [place] [address];  Service: ENT;  Laterality: Left;    EXCISION OF PAROTID GLAND Left 3/21/2022    Procedure: EXCISION, PAROTID GLAND-nerve monitoring;  Surgeon: Edel Hall MD;  Location: [facility] OR [address] [place] [address];  Service: ENT;  Laterality: Left;    KELOID EXCISION      Left ear and she also had  radiation Tx    WISDOM TOOTH EXTRACTION           Current Outpatient Medications:     paroxetine (PAXIL) 40 MG tablet, Take 1 tablet (40 mg total) by mouth once daily., Disp: 90 tablet, Rfl: 3    Review of patient's allergies indicates:  No Known Allergies    Social History     Socioeconomic History    Marital status: Single   Tobacco Use    Smoking status: Never    Smokeless tobacco: Never   Substance and Sexual Activity    Alcohol use: Yes     Comment: once every couple mos.     Drug use: Never    Sexual activity: Not Currently     Partners: Male     Birth control/protection: Condom   Social History Narrative    Works for [employer] ([employer])    Originally from [place]       Family History   Problem Relation Age of Onset    Skin cancer Father     Hypertension Father     Infertility Sister     Diabetes type II Maternal Grandfather     Diabetes type I Maternal Grandmother     Heart disease Maternal Grandmother     Kidney failure Maternal Grandmother         ESRD    Lymphoma Paternal Grandfather        Review of Systems   Constitutional: Negative.    HENT: Negative.    Eyes: Negative.    Respiratory: Negative.    Cardiovascular: Negative.    Gastrointestinal: Negative.    Endocrine: Negative.    Genitourinary: Negative.    Musculoskeletal: Negative.    Integumentary:  Negative.   Allergic/Immunologic: Negative.    Neurological: Negative.    Hematological: Negative.    Psychiatric/Behavioral: Negative.          Objective:      Physical Exam  Constitutional:       Appearance: Normal appearance.   HENT:      Head: Normocephalic and atraumatic.      Right Ear: External ear normal.      Left Ear: External ear normal.   Neck:      Comments: Modified Jose and neck dissection incision CDI.  No redness, drainage, or fluid collection noted. No parotid or neck mass to palpation. Previous I&D with small amount of fibrinous exudate.  Pulmonary:      Effort: Pulmonary effort is normal. No respiratory distress.    Neurological:      General: No focal deficit present.      Mental Status: She is alert.   Psychiatric:         Mood and Affect: Mood normal.         Behavior: Behavior normal.         Thought Content: Thought content normal.         Assessment:       Problem List Items Addressed This Visit          Oncology    Mucoepidermoid carcinoma of salivary gland - Primary           Plan:       Problem List Items Addressed This Visit          Oncology    Mucoepidermoid carcinoma of salivary gland - Primary         T2N1M0 mucoepidermoid carcinoma, intermediate grade, of the left parotid. Completed postoperative XRT 7/18/22. Doing well, HORACIO. Post treatment imaging scheduled. Stitch abscess continuing to heal. She will update me on its progress. Return to clinic in 6 weeks for surveillance or sooner as needed.

## 2022-11-17 ENCOUNTER — OFFICE VISIT (OUTPATIENT)
Dept: PSYCHIATRY | Facility: CLINIC | Age: 27
End: 2022-11-17
Payer: COMMERCIAL

## 2022-11-17 DIAGNOSIS — F41.1 GENERALIZED ANXIETY DISORDER: Primary | ICD-10-CM

## 2022-11-17 PROCEDURE — 1160F RVW MEDS BY RX/DR IN RCRD: CPT | Mod: CPTII,S$GLB,, | Performed by: PSYCHOLOGIST

## 2022-11-17 PROCEDURE — 99999 PR PBB SHADOW E&M-EST. PATIENT-LVL II: CPT | Mod: PBBFAC,,, | Performed by: PSYCHOLOGIST

## 2022-11-17 PROCEDURE — 1160F PR REVIEW ALL MEDS BY PRESCRIBER/CLIN PHARMACIST DOCUMENTED: ICD-10-PCS | Mod: CPTII,S$GLB,, | Performed by: PSYCHOLOGIST

## 2022-11-17 PROCEDURE — 1159F MED LIST DOCD IN RCRD: CPT | Mod: CPTII,S$GLB,, | Performed by: PSYCHOLOGIST

## 2022-11-17 PROCEDURE — 90834 PSYTX W PT 45 MINUTES: CPT | Mod: S$GLB,,, | Performed by: PSYCHOLOGIST

## 2022-11-17 PROCEDURE — 1159F PR MEDICATION LIST DOCUMENTED IN MEDICAL RECORD: ICD-10-PCS | Mod: CPTII,S$GLB,, | Performed by: PSYCHOLOGIST

## 2022-11-17 PROCEDURE — 99999 PR PBB SHADOW E&M-EST. PATIENT-LVL II: ICD-10-PCS | Mod: PBBFAC,,, | Performed by: PSYCHOLOGIST

## 2022-11-17 PROCEDURE — 90834 PR PSYCHOTHERAPY W/PATIENT, 45 MIN: ICD-10-PCS | Mod: S$GLB,,, | Performed by: PSYCHOLOGIST

## 2022-11-17 NOTE — PROGRESS NOTES
PSYCHO-ONCOLOGY NOTE/ Individual Psychotherapy     Date: 11/17/2022   Site:  [facility]        Therapeutic Intervention: Met with patient.  Outpatient - Behavior modifying psychotherapy 60 min - CPT code 39044    Patient was last seen by me on 10/19/2022    Problem list  Patient Active Problem List   Diagnosis    Generalized anxiety disorder    Attention deficit hyperactivity disorder (ADHD), predominantly inattentive type    Parotid neoplasm    Mucoepidermoid carcinoma of salivary gland       Chief complaint/reason for encounter: depression   Met with patient to evaluate psychosocial adaptation to diagnosis/treatment/survivorship of salivary gland cancer    Current Medications  Current Outpatient Medications   Medication    paroxetine (PAXIL) 40 MG tablet     No current facility-administered medications for this visit.       Objective:  Yoana Thomas arrived promptly for the session.   Ms. Thomas was independently ambulatory at the time of session. The patient was fully cooperative throughout the session.  Appearance: age appropriate, casually  dressed, well groomed  Behavior/Cooperation: friendly and cooperative  Speech: normal in rate, volume, and tone and appropriate quality, quantity and organization of sentences  Mood: dysthymic  Affect: euthymic  Thought Process: goal-directed, logical  Thought Content: normal,  No delusions or paranoia; did not appear to be responding to internal stimuli during the session  Orientation: grossly intact  Memory: Grossly intact  Attention Span/Concentration: Attends to session without distraction; reports no difficulty  Fund of Knowledge: average  Estimate of Intelligence: above average from verbal skills and history  Cognition: grossly intact  Insight: patient has awareness of illness; good insight into own behavior and behavior of others  Judgment: the patient's behavior is adequate to circumstances    Interval history and content of current session: Patient  "discussed events and activities since the time of last visit. Discussed current adaptation to disease and treatment status and estrangement from her family. Reports to be coping with moderate difficulty. Historical roots of estrangement explored.   Mother- defines self by being needed, resentful of patient's independence, said "get over it already" (about cancer)  Father- defers to mother, believes patient "broke up the family")  Older sister- said patient couldn't meet the new baby for "several months" due to having received radiation (even after MD said differently), also bothered that patient's illness took attention away from her baby shower  Middle sister- was supposed to be in her wedding, offended that patient couldn't try on wedding party dresses on day she wanted, mad that patient didn't choose [facility]    Also differs from family politically and family has been angry that she dates interracially.    Evaluated cognitive response, paying particular attention to negative intrusive thoughts of a persistent and detrimental nature. Thoughts of this type are in evidence with mild distress. Provided cognitive behavioral therapy to address negative cognitions.  Beliefs related to upbringing: "I'm never enough"  "Don't trust others to care for me" "any errors make me bad"  Introduced cognitive distortions and impact on mood    Identified and evaluated psychosocial and environmental stressors secondary to diagnosis and treatment (Concerns about "biological clock"- 2 year waiting time post-radiation  (sister said "You won't find anyone in that time anyway").  Examined proactive behaviors that may be implemented to minimize or ameliorate psychosocial stressors.      Risk parameters:   Patient reports no suicidal ideation  Patient reports no homicidal ideation  Patient reports no self-injurious behavior  Patient reports no violent behavior   Safety needs:  None at this time      Verbal deficits: None     Patient's response " to intervention:The patient's response to intervention is accepting, motivated.     Progress toward goals: Progress as Expected        Patient reported outcomes:      Distress thermometer:   DISTRESS SCREENING 11/15/2022 11/15/2022 10/16/2022 10/4/2022 8/23/2022 6/9/2022 5/18/2022   Distress Score 1 1 2 2 3 3 2   Practical Problems None of these None of these None of these None of these None of these - -   Family Problems None of these None of these None of these None of these None of these - -   Emotional Problems None of these None of these Worry None of these None of these - -   Spiritual / Pentecostalism Concerns No No No No No - No   Physical Problems None of these None of these Appearance;Feeling Swollen;Skin Dry/Itchy None of these Appearance;Fatigue;Nose Dry - -           PHQ-9= Initial visit: 4  PHQ ANSWERS    Q1. Little interest or pleasure in doing things: Several days (11/17/22 1211)  Q2. Feeling down, depressed, or hopeless: Not at all (11/17/22 1211)  Q3. Trouble falling or staying asleep, or sleeping too much: Several days (11/17/22 1211)  Q4. Feeling tired or having little energy: Several days (11/17/22 1211)  Q5. Poor appetite or overeating: Several days (11/17/22 1211)  Q6. Feeling bad about yourself - or that you are a failure or have let yourself or your family down: Not at all (11/17/22 1211)  Q7. Trouble concentrating on things, such as reading the newspaper or watching television: Not at all (11/17/22 1211)  Q8. Moving or speaking so slowly that other people could have noticed. Or the opposite - being so fidgety or restless that you have been moving around a lot more than usual: Not at all (11/17/22 1211)  Q9. Thoughts that you would be better off dead, or of hurting yourself in some way: Not at all (11/17/22 1211)    PHQ8 Score : 4 (11/17/22 1211)  PHQ-9 Total Score: 4 (11/17/22 1211)      FABIAN-7= Initial visit:6   GAD7 11/17/2022 10/16/2022   1. Feeling nervous, anxious, or on edge? 1 1   2. Not  being able to stop or control worrying? 1 1   3. Worrying too much about different things? 1 2   4. Trouble relaxing? 0 1   5. Being so restless that it is hard to sit still? 0 1   6. Becoming easily annoyed or irritable? 1 2   7. Feeling afraid as if something awful might happen? 0 1   FABIAN-7 Score 4 9          Client Strengths: verbal, intelligent, successful, good social support, good insight, commitment to wellness      Treatment Plan:individual psychotherapy  Target symptoms: depression, anxiety   Why chosen therapy is appropriate versus another modality: relevant to diagnosis, patient responds to this modality, evidence based practice  Outcome monitoring methods: self-report, checklist/rating scale  Therapeutic intervention type: behavior modifying psychotherapy  Prognosis: Excellent    Goals from last visit: Met   Behavioral goals prior to next visit:    Exercise:   Stress management: continue using [employer] ary   Social engagement:   Nutrition:   Smoking Cessation:   Therapy:  track automatic thoughts     Return to clinic: 1 month     Length of Service (minutes direct face-to-face contact): 45    Diagnosis:     ICD-10-CM ICD-9-CM   1. Generalized anxiety disorder  F41.1 300.02       Marcel Barroso, PhD  [place] License #[id number]  [place] License #[id number]

## 2022-12-02 ENCOUNTER — PATIENT MESSAGE (OUTPATIENT)
Dept: RADIATION ONCOLOGY | Facility: CLINIC | Age: 27
End: 2022-12-02
Payer: COMMERCIAL

## 2022-12-03 ENCOUNTER — OFFICE VISIT (OUTPATIENT)
Dept: URGENT CARE | Facility: CLINIC | Age: 27
End: 2022-12-03
Payer: COMMERCIAL

## 2022-12-03 VITALS
HEIGHT: 62 IN | SYSTOLIC BLOOD PRESSURE: 134 MMHG | DIASTOLIC BLOOD PRESSURE: 86 MMHG | HEART RATE: 71 BPM | WEIGHT: 193 LBS | OXYGEN SATURATION: 98 % | BODY MASS INDEX: 35.51 KG/M2 | TEMPERATURE: 98 F | RESPIRATION RATE: 17 BRPM

## 2022-12-03 DIAGNOSIS — J02.9 SORE THROAT: Primary | ICD-10-CM

## 2022-12-03 DIAGNOSIS — J02.9 ACUTE PHARYNGITIS, UNSPECIFIED ETIOLOGY: ICD-10-CM

## 2022-12-03 LAB
CTP QC/QA: YES
CTP QC/QA: YES
MOLECULAR STREP A: NEGATIVE
POC MOLECULAR INFLUENZA A AGN: NEGATIVE
POC MOLECULAR INFLUENZA B AGN: NEGATIVE

## 2022-12-03 PROCEDURE — 87651 STREP A DNA AMP PROBE: CPT | Mod: QW,S$GLB,, | Performed by: FAMILY MEDICINE

## 2022-12-03 PROCEDURE — 3079F PR MOST RECENT DIASTOLIC BLOOD PRESSURE 80-89 MM HG: ICD-10-PCS | Mod: CPTII,S$GLB,, | Performed by: FAMILY MEDICINE

## 2022-12-03 PROCEDURE — 87502 POCT INFLUENZA A/B MOLECULAR: ICD-10-PCS | Mod: QW,S$GLB,, | Performed by: FAMILY MEDICINE

## 2022-12-03 PROCEDURE — 99203 OFFICE O/P NEW LOW 30 MIN: CPT | Mod: S$GLB,,, | Performed by: FAMILY MEDICINE

## 2022-12-03 PROCEDURE — 3075F SYST BP GE 130 - 139MM HG: CPT | Mod: CPTII,S$GLB,, | Performed by: FAMILY MEDICINE

## 2022-12-03 PROCEDURE — 3008F BODY MASS INDEX DOCD: CPT | Mod: CPTII,S$GLB,, | Performed by: FAMILY MEDICINE

## 2022-12-03 PROCEDURE — 87502 INFLUENZA DNA AMP PROBE: CPT | Mod: QW,S$GLB,, | Performed by: FAMILY MEDICINE

## 2022-12-03 PROCEDURE — 3079F DIAST BP 80-89 MM HG: CPT | Mod: CPTII,S$GLB,, | Performed by: FAMILY MEDICINE

## 2022-12-03 PROCEDURE — 3075F PR MOST RECENT SYSTOLIC BLOOD PRESS GE 130-139MM HG: ICD-10-PCS | Mod: CPTII,S$GLB,, | Performed by: FAMILY MEDICINE

## 2022-12-03 PROCEDURE — 1159F MED LIST DOCD IN RCRD: CPT | Mod: CPTII,S$GLB,, | Performed by: FAMILY MEDICINE

## 2022-12-03 PROCEDURE — 1159F PR MEDICATION LIST DOCUMENTED IN MEDICAL RECORD: ICD-10-PCS | Mod: CPTII,S$GLB,, | Performed by: FAMILY MEDICINE

## 2022-12-03 PROCEDURE — 3008F PR BODY MASS INDEX (BMI) DOCUMENTED: ICD-10-PCS | Mod: CPTII,S$GLB,, | Performed by: FAMILY MEDICINE

## 2022-12-03 PROCEDURE — 99203 PR OFFICE/OUTPT VISIT, NEW, LEVL III, 30-44 MIN: ICD-10-PCS | Mod: S$GLB,,, | Performed by: FAMILY MEDICINE

## 2022-12-03 PROCEDURE — 87651 POCT STREP A MOLECULAR: ICD-10-PCS | Mod: QW,S$GLB,, | Performed by: FAMILY MEDICINE

## 2022-12-03 RX ORDER — LORATADINE 10 MG/1
10 TABLET ORAL DAILY
Qty: 30 TABLET | Refills: 2 | Status: SHIPPED | OUTPATIENT
Start: 2022-12-03 | End: 2023-12-03

## 2022-12-03 NOTE — PROGRESS NOTES
"Subjective:       Patient ID: Yoana Thomas is a 27 y.o. female.    Vitals:  height is 5' 2" (1.575 m) and weight is 87.5 kg (193 lb). Her temperature is 98.1 °F (36.7 °C). Her blood pressure is 134/86 and her pulse is 71. Her respiration is 17 and oxygen saturation is 98%.     Chief Complaint: Sore Throat (Entered by patient)    Patient presents to the clinic with a sharp pain in throat x today denies f/c hurts to swallow since this am,  slight body ache, pt is vaccinated  Pt had salivary gland removed early this  spring and has recd 6 weeks of readiation tx/      Sore Throat   This is a new problem. The problem has been gradually worsening. Associated symptoms include congestion, headaches, swollen glands and trouble swallowing. She has tried nothing for the symptoms.     HENT:  Positive for congestion, sore throat and trouble swallowing.    Neurological:  Positive for headaches.     Objective:      Physical Exam   Constitutional: She is oriented to person, place, and time. She appears well-developed. She is cooperative.  Non-toxic appearance. She does not appear ill. No distress.   HENT:   Head: Normocephalic and atraumatic.   Ears:   Right Ear: Hearing, tympanic membrane, external ear and ear canal normal.   Left Ear: Hearing, tympanic membrane, external ear and ear canal normal.   Nose: Nose normal. No mucosal edema, rhinorrhea or nasal deformity. No epistaxis. Right sinus exhibits no maxillary sinus tenderness and no frontal sinus tenderness. Left sinus exhibits no maxillary sinus tenderness and no frontal sinus tenderness.   Mouth/Throat: Uvula is midline, oropharynx is clear and moist and mucous membranes are normal. Mucous membranes are moist. No trismus in the jaw. Normal dentition. No uvula swelling. No posterior oropharyngeal erythema. Oropharynx is clear.   Eyes: Conjunctivae and lids are normal. Pupils are equal, round, and reactive to light. Right eye exhibits no discharge. Left eye exhibits no " discharge. No scleral icterus. Extraocular movement intact   Neck: Trachea normal and phonation normal. Neck supple.      Comments: Left side of neck with surgical scar, sceondary to removal of salivary gland tumor     Cardiovascular: Normal rate, regular rhythm, normal heart sounds and normal pulses.   Pulmonary/Chest: Effort normal and breath sounds normal. No respiratory distress.   Abdominal: Normal appearance and bowel sounds are normal. She exhibits no distension and no mass. Soft. There is no abdominal tenderness.   Musculoskeletal: Normal range of motion.         General: No deformity. Normal range of motion.   Neurological: She is alert and oriented to person, place, and time. She exhibits normal muscle tone. Coordination normal.   Skin: Skin is warm, dry, intact, not diaphoretic and not pale.   Psychiatric: Her speech is normal and behavior is normal. Judgment and thought content normal.   Nursing note and vitals reviewed.      Assessment:       1. Sore throat          Plan:         Sore throat  -     POCT Strep A, Molecular              Pt advised to RTC or follow up with ENT/Oncologist

## 2022-12-15 ENCOUNTER — OFFICE VISIT (OUTPATIENT)
Dept: PSYCHIATRY | Facility: CLINIC | Age: 27
End: 2022-12-15
Payer: COMMERCIAL

## 2022-12-15 DIAGNOSIS — F41.1 GENERALIZED ANXIETY DISORDER: Primary | ICD-10-CM

## 2022-12-15 PROCEDURE — 90834 PR PSYCHOTHERAPY W/PATIENT, 45 MIN: ICD-10-PCS | Mod: S$GLB,,, | Performed by: PSYCHOLOGIST

## 2022-12-15 PROCEDURE — 1160F RVW MEDS BY RX/DR IN RCRD: CPT | Mod: CPTII,S$GLB,, | Performed by: PSYCHOLOGIST

## 2022-12-15 PROCEDURE — 1160F PR REVIEW ALL MEDS BY PRESCRIBER/CLIN PHARMACIST DOCUMENTED: ICD-10-PCS | Mod: CPTII,S$GLB,, | Performed by: PSYCHOLOGIST

## 2022-12-15 PROCEDURE — 1159F MED LIST DOCD IN RCRD: CPT | Mod: CPTII,S$GLB,, | Performed by: PSYCHOLOGIST

## 2022-12-15 PROCEDURE — 90834 PSYTX W PT 45 MINUTES: CPT | Mod: S$GLB,,, | Performed by: PSYCHOLOGIST

## 2022-12-15 PROCEDURE — 1159F PR MEDICATION LIST DOCUMENTED IN MEDICAL RECORD: ICD-10-PCS | Mod: CPTII,S$GLB,, | Performed by: PSYCHOLOGIST

## 2022-12-15 PROCEDURE — 99999 PR PBB SHADOW E&M-EST. PATIENT-LVL II: ICD-10-PCS | Mod: PBBFAC,,, | Performed by: PSYCHOLOGIST

## 2022-12-15 PROCEDURE — 99999 PR PBB SHADOW E&M-EST. PATIENT-LVL II: CPT | Mod: PBBFAC,,, | Performed by: PSYCHOLOGIST

## 2022-12-15 NOTE — PROGRESS NOTES
PSYCHO-ONCOLOGY NOTE/ Individual Psychotherapy     Date: 12/15/2022   Site:  [facility]        Therapeutic Intervention: Met with patient.  Outpatient - Behavior modifying psychotherapy 45 min - CPT code 18538    Patient was last seen by me on 11/17/2022    Problem list  Patient Active Problem List   Diagnosis    Generalized anxiety disorder    Attention deficit hyperactivity disorder (ADHD), predominantly inattentive type    Parotid neoplasm    Mucoepidermoid carcinoma of salivary gland       Chief complaint/reason for encounter: adjustment, anxiety   Met with patient to evaluate psychosocial adaptation to diagnosis/treatment/survivorship of parotid cancer    Current Medications  Current Outpatient Medications   Medication    loratadine (CLARITIN) 10 mg tablet    paroxetine (PAXIL) 40 MG tablet     No current facility-administered medications for this visit.       Objective:  Yoana Thomas arrived promptly for the session.  Ms. Thomas was independently ambulatory at the time of session. The patient was fully cooperative throughout the session.  Appearance: age appropriate, casually  dressed, well groomed  Behavior/Cooperation: friendly and cooperative  Speech: normal in rate, volume, and tone and appropriate quality, quantity and organization of sentences  Mood:mild sadness  Affect: euthymic  Thought Process: goal-directed, logical  Thought Content: normal,  No delusions or paranoia; did not appear to be responding to internal stimuli during the session  Orientation: grossly intact  Memory: Grossly intact  Attention Span/Concentration: Attends to session without distraction; reports no difficulty  Fund of Knowledge: average  Estimate of Intelligence: above average from verbal skills and history  Cognition: grossly intact  Insight: patient has awareness of illness; good insight into own behavior and behavior of others  Judgment: the patient's behavior is adequate to circumstances    Interval history and  "content of current session: Patient discussed events and activities since the time of last visit. Discussed current adaptation to disease and treatment status. Reports to be coping with improvement. She does report some increase in symptoms (hoarseness, pain) with change in weather. Evaluated cognitive response, paying particular attention to negative intrusive thoughts of a persistent and detrimental nature. Thoughts of this type are in evidence with mild distress. Provided cognitive behavioral therapy to address negative cognitions.  Discussed triggering of thoughts of cancer due to AM discomfort and fears of recurrence due to boyfriend's  discharge and imminent move to [place] ("what if my cancer comes back and I don't have him to lean on").    Identified and evaluated psychosocial and environmental stressors secondary to diagnosis and treatment (family discord). Patient learned her maternal side of the family has not been supportive because her mother did not tell them about her illness. Added to the pain related to estrangement from them for Holidays. Examined proactive behaviors that may be implemented to minimize or ameliorate psychosocial stressors. Going to [place] to see friends and paternal side of family for SUNIL.    Discussed assertiveness in work situations.    Eating healthily, working out, drinking water to improve "control over my body."     Risk parameters:   Patient reports no suicidal ideation  Patient reports no homicidal ideation  Patient reports no self-injurious behavior  Patient reports no violent behavior   Safety needs:  None at this time      Verbal deficits: None     Patient's response to intervention:The patient's response to intervention is accepting, motivated.     Progress toward goals: Progress as Expected        Patient reported outcomes:      Distress thermometer:   DISTRESS SCREENING 12/15/2022 11/15/2022 11/15/2022 10/16/2022 10/4/2022 8/23/2022 6/9/2022   Distress Score 0 - No " Distress 1 1 2 2 3 3   Practical Problems None of these None of these None of these None of these None of these None of these -   Family Problems None of these None of these None of these None of these None of these None of these -   Emotional Problems Worry;None of these None of these None of these Worry None of these None of these -   Spiritual / Mu-ism Concerns No No No No No No -   Physical Problems Appearance None of these None of these Appearance;Feeling Swollen;Skin Dry/Itchy None of these Appearance;Fatigue;Nose Dry -           PHQ-9= Initial visit: 4  PHQ ANSWERS    Q1. Little interest or pleasure in doing things: Not at all (12/15/22 1254)  Q2. Feeling down, depressed, or hopeless: Not at all (12/15/22 1254)  Q3. Trouble falling or staying asleep, or sleeping too much: Several days (12/15/22 1254)  Q4. Feeling tired or having little energy: Several days (12/15/22 1254)  Q5. Poor appetite or overeating: Not at all (12/15/22 1254)  Q6. Feeling bad about yourself - or that you are a failure or have let yourself or your family down: Not at all (12/15/22 1254)  Q7. Trouble concentrating on things, such as reading the newspaper or watching television: Not at all (12/15/22 1254)  Q8. Moving or speaking so slowly that other people could have noticed. Or the opposite - being so fidgety or restless that you have been moving around a lot more than usual: Not at all (12/15/22 1254)  Q9. Thoughts that you would be better off dead, or of hurting yourself in some way: Not at all (12/15/22 1254)    PHQ8 Score : 2 (12/15/22 1254)  PHQ-9 Total Score: 2 (12/15/22 1254)      FABIAN-7= Initial visit:6   GAD7 12/15/2022 11/17/2022 10/16/2022   1. Feeling nervous, anxious, or on edge? 1 1 1   2. Not being able to stop or control worrying? 1 1 1   3. Worrying too much about different things? 1 1 2   4. Trouble relaxing? 0 0 1   5. Being so restless that it is hard to sit still? 0 0 1   6. Becoming easily annoyed or irritable? 1 1  2   7. Feeling afraid as if something awful might happen? 0 0 1   FABIAN-7 Score 4 4 9          Client Strengths: verbal, intelligent, successful, good social support, good insight, commitment to wellness,       Treatment Plan:individual psychotherapy  Target symptoms: anxiety   Why chosen therapy is appropriate versus another modality: relevant to diagnosis, patient responds to this modality, evidence based practice  Outcome monitoring methods: self-report, checklist/rating scale  Therapeutic intervention type: behavior modifying psychotherapy  Prognosis: Excellent    Goals from last visit: Met   Behavioral goals prior to next visit:    Exercise: continue time at gym   Stress management:   Social engagement:   Nutrition:   Smoking Cessation:   Therapy:  monitor thoughts    Write challenges    Return to clinic: 1 month     Length of Service (minutes direct face-to-face contact): 45    Diagnosis:     ICD-10-CM ICD-9-CM   1. Generalized anxiety disorder  F41.1 300.02       Marcel Barroso, PhD  [place] License #[id number]  [place] License #[id number]

## 2022-12-20 ENCOUNTER — TELEPHONE (OUTPATIENT)
Dept: INFUSION THERAPY | Facility: HOSPITAL | Age: 27
End: 2022-12-20
Payer: COMMERCIAL

## 2022-12-28 ENCOUNTER — TELEPHONE (OUTPATIENT)
Dept: INFUSION THERAPY | Facility: HOSPITAL | Age: 27
End: 2022-12-28
Payer: COMMERCIAL

## 2023-01-03 ENCOUNTER — OFFICE VISIT (OUTPATIENT)
Dept: OTOLARYNGOLOGY | Facility: CLINIC | Age: 28
End: 2023-01-03
Payer: COMMERCIAL

## 2023-01-03 VITALS
HEART RATE: 80 BPM | SYSTOLIC BLOOD PRESSURE: 108 MMHG | DIASTOLIC BLOOD PRESSURE: 74 MMHG | BODY MASS INDEX: 35.7 KG/M2 | HEIGHT: 62 IN | WEIGHT: 194 LBS

## 2023-01-03 DIAGNOSIS — C08.9 MUCOEPIDERMOID CARCINOMA OF SALIVARY GLAND: Primary | ICD-10-CM

## 2023-01-03 PROCEDURE — 99999 PR PBB SHADOW E&M-EST. PATIENT-LVL III: ICD-10-PCS | Mod: PBBFAC,,, | Performed by: OTOLARYNGOLOGY

## 2023-01-03 PROCEDURE — 99999 PR PBB SHADOW E&M-EST. PATIENT-LVL III: CPT | Mod: PBBFAC,,, | Performed by: OTOLARYNGOLOGY

## 2023-01-03 PROCEDURE — 99214 PR OFFICE/OUTPT VISIT, EST, LEVL IV, 30-39 MIN: ICD-10-PCS | Mod: S$GLB,,, | Performed by: OTOLARYNGOLOGY

## 2023-01-03 PROCEDURE — 99214 OFFICE O/P EST MOD 30 MIN: CPT | Mod: S$GLB,,, | Performed by: OTOLARYNGOLOGY

## 2023-01-03 PROCEDURE — 3078F DIAST BP <80 MM HG: CPT | Mod: CPTII,S$GLB,, | Performed by: OTOLARYNGOLOGY

## 2023-01-03 PROCEDURE — 3074F SYST BP LT 130 MM HG: CPT | Mod: CPTII,S$GLB,, | Performed by: OTOLARYNGOLOGY

## 2023-01-03 PROCEDURE — 1159F PR MEDICATION LIST DOCUMENTED IN MEDICAL RECORD: ICD-10-PCS | Mod: CPTII,S$GLB,, | Performed by: OTOLARYNGOLOGY

## 2023-01-03 PROCEDURE — 3074F PR MOST RECENT SYSTOLIC BLOOD PRESSURE < 130 MM HG: ICD-10-PCS | Mod: CPTII,S$GLB,, | Performed by: OTOLARYNGOLOGY

## 2023-01-03 PROCEDURE — 3078F PR MOST RECENT DIASTOLIC BLOOD PRESSURE < 80 MM HG: ICD-10-PCS | Mod: CPTII,S$GLB,, | Performed by: OTOLARYNGOLOGY

## 2023-01-03 PROCEDURE — 3008F PR BODY MASS INDEX (BMI) DOCUMENTED: ICD-10-PCS | Mod: CPTII,S$GLB,, | Performed by: OTOLARYNGOLOGY

## 2023-01-03 PROCEDURE — 1159F MED LIST DOCD IN RCRD: CPT | Mod: CPTII,S$GLB,, | Performed by: OTOLARYNGOLOGY

## 2023-01-03 PROCEDURE — 3008F BODY MASS INDEX DOCD: CPT | Mod: CPTII,S$GLB,, | Performed by: OTOLARYNGOLOGY

## 2023-01-04 NOTE — PROGRESS NOTES
Subjective:       Patient ID: Yoana Thomas is a 27 y.o. female.    Oncology History   Mucoepidermoid carcinoma of salivary gland   3/21/2022 Surgery    EXCISION, PAROTID GLAND-nerve monitoring (Left)  CREATION, FLAP, MUSCLE ROTATION-SCM (Left)     4/4/2022 Initial Diagnosis    Mucoepidermoid carcinoma of salivary gland     4/4/2022 Cancer Staged    Staging form: Major Salivary Glands, AJCC 8th Edition  - Pathologic stage from 4/4/2022: Stage III (pT2, pN1, cM0)        Radiation Therapy    Treating physician: Alli Blanco  Total Dose: 60 Gy  Fractions: 30    Treatment Summary  Course: C1 H&N 2022  Treatment Site Ref. ID Energy Dose/Fx (Gy) #Fx Dose Correction (Gy) Total Dose (Gy) Start Date End Date Elapsed [facility]   [facility] / 30 0 60 6/6/2022 7/18/2022 42          Chief Complaint: post op  HPI     Yoana Thomas returns for surveillance. Completed XRT 7/18/22. Doing well. Taste mostly returned. Previous stitch abscess site now well healed. She has some left jaw tenderness which is worse with cold. No current pain or areas of swelling or fluid collection.    Past Medical History:   Diagnosis Date    Anxiety     Attention deficit hyperactivity disorder (ADHD), predominantly inattentive type 06/14/2021    Hx of psychiatric care     Therapy        Past Surgical History:   Procedure Laterality Date    CREATION OF MUSCLE ROTATIONAL FLAP Left 3/21/2022    Procedure: CREATION, FLAP, MUSCLE ROTATION-SCM;  Surgeon: Edel Hall MD;  Location: [facility] OR [address] [place] [address];  Service: ENT;  Laterality: Left;    DISSECTION OF NECK Left 4/20/2022    Procedure: DISSECTION, NECK;  Surgeon: Edel Hall MD;  Location: [facility] OR [address] [place] [address];  Service: ENT;  Laterality: Left;    EXCISION OF PAROTID GLAND Left 3/21/2022    Procedure: EXCISION, PAROTID GLAND-nerve monitoring;  Surgeon: Edel Hall MD;  Location: [facility] OR [address] [place] [address];  Service: ENT;  Laterality: Left;    KELOID EXCISION      Left ear and  she also had radiation Tx    WISDOM TOOTH EXTRACTION           Current Outpatient Medications:     paroxetine (PAXIL) 40 MG tablet, Take 1 tablet (40 mg total) by mouth once daily., Disp: 90 tablet, Rfl: 3    loratadine (CLARITIN) 10 mg tablet, Take 1 tablet (10 mg total) by mouth once daily., Disp: 30 tablet, Rfl: 2    Review of patient's allergies indicates:  No Known Allergies    Social History     Socioeconomic History    Marital status: Single   Tobacco Use    Smoking status: Never     Passive exposure: Never    Smokeless tobacco: Never   Substance and Sexual Activity    Alcohol use: Yes     Comment: once every couple mos.     Drug use: Never    Sexual activity: Not Currently     Partners: Male     Birth control/protection: Condom   Social History Narrative    Works for [employer])    Originally from [place]       Family History   Problem Relation Age of Onset    Skin cancer Father     Hypertension Father     Infertility Sister     Diabetes type II Maternal Grandfather     Diabetes type I Maternal Grandmother     Heart disease Maternal Grandmother     Kidney failure Maternal Grandmother         ESRD    Lymphoma Paternal Grandfather        Review of Systems   Constitutional: Negative.    HENT: Negative.    Eyes: Negative.    Respiratory: Negative.    Cardiovascular: Negative.    Gastrointestinal: Negative.    Endocrine: Negative.    Genitourinary: Negative.    Musculoskeletal: Negative.    Integumentary:  Negative.   Allergic/Immunologic: Negative.    Neurological: Negative.    Hematological: Negative.    Psychiatric/Behavioral: Negative.          Objective:      Physical Exam  Constitutional:       Appearance: Normal appearance.   HENT:      Head: Normocephalic and atraumatic.      Right Ear: External ear normal.      Left Ear: External ear normal.   Neck:      Comments: Modified Jose and neck dissection incision CDI.  No redness, drainage, or fluid collection noted. No parotid or neck mass  to palpation. Previous I&D site well healed.  Pulmonary:      Effort: Pulmonary effort is normal. No respiratory distress.   Neurological:      General: No focal deficit present.      Mental Status: She is alert.   Psychiatric:         Mood and Affect: Mood normal.         Behavior: Behavior normal.         Thought Content: Thought content normal.         Assessment:       Problem List Items Addressed This Visit          Oncology    Mucoepidermoid carcinoma of salivary gland - Primary             Plan:       Problem List Items Addressed This Visit          Oncology    Mucoepidermoid carcinoma of salivary gland - Primary           T2N1M0 mucoepidermoid carcinoma, intermediate grade, of the left parotid. Completed postoperative XRT 7/18/22. Doing well, HORACIO. Return to clinic in 6 weeks for surveillance or sooner as needed.

## 2023-01-19 ENCOUNTER — OFFICE VISIT (OUTPATIENT)
Dept: RADIATION ONCOLOGY | Facility: CLINIC | Age: 28
End: 2023-01-19
Payer: COMMERCIAL

## 2023-01-19 VITALS
BODY MASS INDEX: 35.54 KG/M2 | OXYGEN SATURATION: 98 % | HEIGHT: 62 IN | DIASTOLIC BLOOD PRESSURE: 77 MMHG | SYSTOLIC BLOOD PRESSURE: 117 MMHG | HEART RATE: 81 BPM | WEIGHT: 193.13 LBS

## 2023-01-19 DIAGNOSIS — Z92.3 HISTORY OF HEAD AND NECK RADIATION: ICD-10-CM

## 2023-01-19 DIAGNOSIS — C08.9 MUCOEPIDERMOID CARCINOMA OF SALIVARY GLAND: Primary | ICD-10-CM

## 2023-01-19 PROBLEM — D49.0 PAROTID NEOPLASM: Status: RESOLVED | Noted: 2022-03-21 | Resolved: 2023-01-19

## 2023-01-19 PROCEDURE — 1159F PR MEDICATION LIST DOCUMENTED IN MEDICAL RECORD: ICD-10-PCS | Mod: CPTII,S$GLB,, | Performed by: STUDENT IN AN ORGANIZED HEALTH CARE EDUCATION/TRAINING PROGRAM

## 2023-01-19 PROCEDURE — 3074F SYST BP LT 130 MM HG: CPT | Mod: CPTII,S$GLB,, | Performed by: STUDENT IN AN ORGANIZED HEALTH CARE EDUCATION/TRAINING PROGRAM

## 2023-01-19 PROCEDURE — 99999 PR PBB SHADOW E&M-EST. PATIENT-LVL III: ICD-10-PCS | Mod: PBBFAC,,, | Performed by: STUDENT IN AN ORGANIZED HEALTH CARE EDUCATION/TRAINING PROGRAM

## 2023-01-19 PROCEDURE — 3078F PR MOST RECENT DIASTOLIC BLOOD PRESSURE < 80 MM HG: ICD-10-PCS | Mod: CPTII,S$GLB,, | Performed by: STUDENT IN AN ORGANIZED HEALTH CARE EDUCATION/TRAINING PROGRAM

## 2023-01-19 PROCEDURE — 31575 PR LARYNGOSCOPY, FLEXIBLE; DIAGNOSTIC: ICD-10-PCS | Mod: S$GLB,,, | Performed by: STUDENT IN AN ORGANIZED HEALTH CARE EDUCATION/TRAINING PROGRAM

## 2023-01-19 PROCEDURE — 3078F DIAST BP <80 MM HG: CPT | Mod: CPTII,S$GLB,, | Performed by: STUDENT IN AN ORGANIZED HEALTH CARE EDUCATION/TRAINING PROGRAM

## 2023-01-19 PROCEDURE — 3074F PR MOST RECENT SYSTOLIC BLOOD PRESSURE < 130 MM HG: ICD-10-PCS | Mod: CPTII,S$GLB,, | Performed by: STUDENT IN AN ORGANIZED HEALTH CARE EDUCATION/TRAINING PROGRAM

## 2023-01-19 PROCEDURE — 99213 OFFICE O/P EST LOW 20 MIN: CPT | Mod: 25,S$GLB,, | Performed by: STUDENT IN AN ORGANIZED HEALTH CARE EDUCATION/TRAINING PROGRAM

## 2023-01-19 PROCEDURE — 1159F MED LIST DOCD IN RCRD: CPT | Mod: CPTII,S$GLB,, | Performed by: STUDENT IN AN ORGANIZED HEALTH CARE EDUCATION/TRAINING PROGRAM

## 2023-01-19 PROCEDURE — 3008F PR BODY MASS INDEX (BMI) DOCUMENTED: ICD-10-PCS | Mod: CPTII,S$GLB,, | Performed by: STUDENT IN AN ORGANIZED HEALTH CARE EDUCATION/TRAINING PROGRAM

## 2023-01-19 PROCEDURE — 3008F BODY MASS INDEX DOCD: CPT | Mod: CPTII,S$GLB,, | Performed by: STUDENT IN AN ORGANIZED HEALTH CARE EDUCATION/TRAINING PROGRAM

## 2023-01-19 PROCEDURE — 31575 DIAGNOSTIC LARYNGOSCOPY: CPT | Mod: S$GLB,,, | Performed by: STUDENT IN AN ORGANIZED HEALTH CARE EDUCATION/TRAINING PROGRAM

## 2023-01-19 PROCEDURE — 99213 PR OFFICE/OUTPT VISIT, EST, LEVL III, 20-29 MIN: ICD-10-PCS | Mod: 25,S$GLB,, | Performed by: STUDENT IN AN ORGANIZED HEALTH CARE EDUCATION/TRAINING PROGRAM

## 2023-01-19 PROCEDURE — 99999 PR PBB SHADOW E&M-EST. PATIENT-LVL III: CPT | Mod: PBBFAC,,, | Performed by: STUDENT IN AN ORGANIZED HEALTH CARE EDUCATION/TRAINING PROGRAM

## 2023-01-19 NOTE — PROGRESS NOTES
[facility] Department of Radiation Oncology  Follow Up Visit Note    Diagnosis:  Yoana Thomas is a 27 y.o. female with T2N1M0, group stage III, mucoepidermoid carcinoma of the left parotid s/p left parotid excision (3/21/22) and left neck dissection (4/20/22). She was treated with adjuvant radiation to 60 Gy in 30 fractions to the parotid operative bed and 54 Gy in 30 fractions to the left neck, completed 7/18/22.     Oncologic History:  She has a history of a keloid of the left earlobe treated with RT in approximately 2016 in [facility]). She presented with a left neck mass.  2/16/22: CT neck with a 2.1cm mixed sold and cystic mass in the left superficial parotid with an additional enlarged 9 mm parotid node and prominent left neck adenopathy  2/16/22: FNA in clinic with atypical cells  3/21/22: left parotid gland excision with muscle rotation flap.  Path: 2.2 cm intermediate grade mucoepidermoid carcinoma with no extraparenchymal extension. - SM at 1mm, -PNI, +LVSI. 1/4 LN + for metastatic carcinoma (largest deposit 2mm), no CHARLES  4/8/22: PET/CT with no FDG avid tumor. Mild uptake in the left parotid gland possibly from prior surgery. No FDG avid lymphadenopathy.  4/20/22: left neck level II-IV dissection, with 0-29 LN+ for carcinoma  6/6/22-7/18/22: 60 Gy in 30 fractions to the parotid operativebed and 54 Gy in 30 fractions to the left neck (level IB-IV).    Interval History  The patient presents today for surveillance. She has some tenderness and stiffness in the left mandible. She has some stiffness in the left jaw for the past month or two. It is not progressing and is exacerbated by the cold. She also noted some blood tingued sputum, last ~2-3 weeks ago. This was worse when she was having to work outside in the cold and having to yell. It has resolved. No otaliga.     Review of Systems   ROS as above    Social History:  Social History     Tobacco Use    Smoking status: Never     Passive  "exposure: Never    Smokeless tobacco: Never   Substance Use Topics    Alcohol use: Yes     Comment: once every couple mos.     Drug use: Never       Family History:  Cancer-related family history includes Lymphoma in her paternal grandfather; Skin cancer in her father.    Exam:  Vitals:    01/19/23 1343   BP: 117/77   Pulse: 81   SpO2: 98%   Weight: 87.6 kg (193 lb 2 oz)   Height: 5' 2" (1.575 m)       HR 87 and pulse 97% on personal check    Constitutional: Pleasant 27 y.o. female in no acute distress.  Well nourished. Well groomed.   HEENT: no facial asymmetry, sensation intact in V1-3 (other than over incisions). No appreciated OC or OPX scans.   Lymph: no appreciated pre-auricular or cervical adenopathy  Lungs: No audible wheezing.  Normal effort.   Musculoskeletal: No gross MSK deformities.  Skin: No rashes appreciated.   Psych: Alert and oriented with appropriate mood and affect.  Neuro: Grossly normal.    PROCEDURE:  Flexible nasolaryngoscopy  INDICATION:  hemoptysis, history of H&N RT. gag reflex  PROCEDURE DETAILS:  After verbal consent was obtained and a time-out was performed, the nasal cavity was topically anesthetized.  A flexible nasolaryngoscope was introduced through the right nasal cavity.  There were no complications.  Findings:   Vocal cords were visualized bilaterally with normal function. Mild swelling of left AE fold without mucosal lesion appreciated in the oropharynx, larynx, or hypopharynx. No bleeding. Airway is patent       Data Review:    Information obtained via chart review and discussion with Ms. Thomas    Assessment:  T2N1M0, group stage III, mucoepidermoid carcinoma of the left parotid s/p left parotid excision (3/21/22) and left neck dissection (4/20/22). She was treated with adjuvant radiation to 60 Gy in 30 fractions to the parotid operative bed and 54 Gy in 30 fractions to the left neck, completed 7/18/22  She has a history of prior left ear radiation for a keloid (unable to " obtain prior records)  Doing well, with HORACIO on exam and FNL.   Grade 1 trismus  Subcm right lung nodule  ECOG: (0) Fully active, able to carry on all predisease performance without restriction    Plan:  RTC 3 months for continued surveillance with follow up CT neck chest.   TSH WNL 8/29/22. Recommend she check this every 6 months, can be with me or PCP  Continue routine dental cleaning, fluoride toothpaste  Referral to speech for grade 1 trismus    Ricky Blanco MD  Radiation Oncology

## 2023-01-19 NOTE — Clinical Note
Sending this patient your way. Starting to have trismus after parotidectomy and radiation. Seeing if you guys have anything that could help her. Want to try and catch it early. Thanks in advance for all of your help!

## 2023-01-23 ENCOUNTER — OFFICE VISIT (OUTPATIENT)
Dept: PSYCHIATRY | Facility: CLINIC | Age: 28
End: 2023-01-23
Payer: COMMERCIAL

## 2023-01-23 DIAGNOSIS — F41.1 GENERALIZED ANXIETY DISORDER: Primary | ICD-10-CM

## 2023-01-23 PROCEDURE — 99999 PR PBB SHADOW E&M-EST. PATIENT-LVL II: ICD-10-PCS | Mod: PBBFAC,,, | Performed by: PSYCHOLOGIST

## 2023-01-23 PROCEDURE — 90834 PSYTX W PT 45 MINUTES: CPT | Mod: S$GLB,,, | Performed by: PSYCHOLOGIST

## 2023-01-23 PROCEDURE — 1160F RVW MEDS BY RX/DR IN RCRD: CPT | Mod: CPTII,S$GLB,, | Performed by: PSYCHOLOGIST

## 2023-01-23 PROCEDURE — 1159F MED LIST DOCD IN RCRD: CPT | Mod: CPTII,S$GLB,, | Performed by: PSYCHOLOGIST

## 2023-01-23 PROCEDURE — 90834 PR PSYCHOTHERAPY W/PATIENT, 45 MIN: ICD-10-PCS | Mod: S$GLB,,, | Performed by: PSYCHOLOGIST

## 2023-01-23 PROCEDURE — 1159F PR MEDICATION LIST DOCUMENTED IN MEDICAL RECORD: ICD-10-PCS | Mod: CPTII,S$GLB,, | Performed by: PSYCHOLOGIST

## 2023-01-23 PROCEDURE — 99999 PR PBB SHADOW E&M-EST. PATIENT-LVL II: CPT | Mod: PBBFAC,,, | Performed by: PSYCHOLOGIST

## 2023-01-23 PROCEDURE — 1160F PR REVIEW ALL MEDS BY PRESCRIBER/CLIN PHARMACIST DOCUMENTED: ICD-10-PCS | Mod: CPTII,S$GLB,, | Performed by: PSYCHOLOGIST

## 2023-01-23 NOTE — PROGRESS NOTES
PSYCHO-ONCOLOGY NOTE/ Individual Psychotherapy     Date: 1/23/2023   Site:  [facility]        Therapeutic Intervention: Met with patient.  Outpatient - Behavior modifying psychotherapy 45 min - CPT code 65874    The patient's last visit with me was on 12/15/2022.    Problem list  Patient Active Problem List   Diagnosis    Generalized anxiety disorder    Attention deficit hyperactivity disorder (ADHD), predominantly inattentive type    Mucoepidermoid carcinoma of salivary gland       Chief complaint/reason for encounter: adjustment, anxiety   Met with patient to evaluate psychosocial adaptation to diagnosis/treatment/survivorship of parotid cancer    Current Medications  Current Outpatient Medications   Medication    loratadine (CLARITIN) 10 mg tablet    paroxetine (PAXIL) 40 MG tablet     No current facility-administered medications for this visit.       Objective:  Yoana Thomas arrived promptly for the session.  Ms. Thomas was independently ambulatory at the time of session. The patient was fully cooperative throughout the session.  Appearance: age appropriate, casually  dressed, well groomed  Behavior/Cooperation: friendly and cooperative  Speech: normal in rate, volume, and tone and appropriate quality, quantity and organization of sentences  Mood:euthymic  Affect: euthymic  Thought Process: goal-directed, logical  Thought Content: normal,  No delusions or paranoia; did not appear to be responding to internal stimuli during the session  Orientation: grossly intact  Memory: Grossly intact  Attention Span/Concentration: Attends to session without distraction; reports no difficulty  Fund of Knowledge: average  Estimate of Intelligence: above average from verbal skills and history  Cognition: grossly intact  Insight: patient has awareness of illness; good insight into own behavior and behavior of others  Judgment: the patient's behavior is adequate to circumstances    Interval history and content of  "current session: Patient discussed events and activities since the time of last visit. Discussed current adaptation to disease and treatment status. Reports to be coping with improvement. She does report having seen her parents over the holidays.. It was a difficult conversation, but patient believes she coped well by "not matching their emotions." She did enjoy her SUNIL with her best friend.     Discussed her work burnout and some consideration of leaving her job/[employer]. She did reflect on feeling "constrained" by her diagnosis (need for insurance, need for access to a cancer center, etc.).     Eating healthily, working out, drinking water to improve "control over my body."     Risk parameters:   Patient reports no suicidal ideation  Patient reports no homicidal ideation  Patient reports no self-injurious behavior  Patient reports no violent behavior   Safety needs:  None at this time      Verbal deficits: None     Patient's response to intervention:The patient's response to intervention is accepting, motivated.     Progress toward goals: Progress as Expected        Patient reported outcomes:      Distress thermometer:   DISTRESS SCREENING 1/23/2023 1/19/2023 1/3/2023 12/15/2022 11/15/2022 11/15/2022 10/16/2022   Distress Score 0 - No Distress 0 - No Distress 0 - No Distress 0 - No Distress 1 1 2   Practical Problems None of these None of these None of these None of these None of these None of these None of these   Family Problems None of these None of these None of these None of these None of these None of these None of these   Emotional Problems None of these None of these None of these Worry;None of these None of these None of these Worry   Spiritual / Protestant Concerns No No No No No No No   Physical Problems Appearance;Feeling Swollen None of these None of these Appearance None of these None of these Appearance;Feeling Swollen;Skin Dry/Itchy           PHQ-9= Initial visit: 4  PHQ ANSWERS    Q1. Little " interest or pleasure in doing things: Not at all (01/23/23 1047)  Q2. Feeling down, depressed, or hopeless: Not at all (01/23/23 1047)  Q3. Trouble falling or staying asleep, or sleeping too much: Several days (01/23/23 1047)  Q4. Feeling tired or having little energy: Several days (01/23/23 1047)  Q5. Poor appetite or overeating: Not at all (01/23/23 1047)  Q6. Feeling bad about yourself - or that you are a failure or have let yourself or your family down: Not at all (01/23/23 1047)  Q7. Trouble concentrating on things, such as reading the newspaper or watching television: Not at all (01/23/23 1047)  Q8. Moving or speaking so slowly that other people could have noticed. Or the opposite - being so fidgety or restless that you have been moving around a lot more than usual: Not at all (01/23/23 1047)  Q9. Thoughts that you would be better off dead, or of hurting yourself in some way: Not at all (01/23/23 1047)    PHQ8 Score : 2 (01/23/23 1047)  PHQ-9 Total Score: 2 (01/23/23 1047)      FABIAN-7= Initial visit:6   GAD7 1/23/2023 12/15/2022 11/17/2022   1. Feeling nervous, anxious, or on edge? 1 1 1   2. Not being able to stop or control worrying? 0 1 1   3. Worrying too much about different things? 1 1 1   4. Trouble relaxing? 0 0 0   5. Being so restless that it is hard to sit still? 0 0 0   6. Becoming easily annoyed or irritable? 1 1 1   7. Feeling afraid as if something awful might happen? 0 0 0   FABIAN-7 Score 3 4 4          Client Strengths: verbal, intelligent, successful, good social support, good insight, commitment to wellness,       Treatment Plan:individual psychotherapy  Target symptoms: anxiety   Why chosen therapy is appropriate versus another modality: relevant to diagnosis, patient responds to this modality, evidence based practice  Outcome monitoring methods: self-report, checklist/rating scale  Therapeutic intervention type: behavior modifying psychotherapy  Prognosis: Excellent    Goals from last visit:  Met   Behavioral goals prior to next visit:    Exercise: continue time at gym   Stress management: declutter, consider new apartment/purchasing a condo?   Social engagement:  consider ways to make new friends   Nutrition:   Smoking Cessation:   Therapy:  monitor thoughts    Write challenges    Return to clinic: 1 month     Length of Service (minutes direct face-to-face contact): 45    Diagnosis:     ICD-10-CM ICD-9-CM   1. Generalized anxiety disorder  F41.1 300.02         Marcel Barroso, PhD  [place] License #[id number]  [place] License #[id number]

## 2023-01-30 ENCOUNTER — CLINICAL SUPPORT (OUTPATIENT)
Dept: SPEECH THERAPY | Facility: HOSPITAL | Age: 28
End: 2023-01-30
Attending: STUDENT IN AN ORGANIZED HEALTH CARE EDUCATION/TRAINING PROGRAM
Payer: COMMERCIAL

## 2023-01-30 DIAGNOSIS — R25.2 TRISMUS: ICD-10-CM

## 2023-01-30 DIAGNOSIS — R13.11 DYSPHAGIA, ORAL PHASE: Primary | ICD-10-CM

## 2023-01-30 DIAGNOSIS — C08.9 MUCOEPIDERMOID CARCINOMA OF SALIVARY GLAND: ICD-10-CM

## 2023-01-30 DIAGNOSIS — Z92.3 HISTORY OF HEAD AND NECK RADIATION: ICD-10-CM

## 2023-01-30 PROCEDURE — 92610 EVALUATE SWALLOWING FUNCTION: CPT | Mod: GN | Performed by: SPEECH-LANGUAGE PATHOLOGIST

## 2023-01-30 NOTE — PATIENT INSTRUCTIONS
Neck range of motion exercises:    For each, turn/move until you feel a stretch, but do not continue to the point that it is painful.  Hold (no bouncing) the position for 10 seconds.  Do 10 repetitions 2-3x/day.    1.  Turn your head to the side to try to place your chin over your shoulder.  Hold, then turn to the opposite side.    2.  Turn as above, and once at the point you feel a stretch, lift your chin up so that you are looking at the ceiling or a high point on the wall.  Hold, then repeat on the opposite side.    3.  Shoulder rolls:  Hold your arms in a relaxed manner at your sides or with your hands in your lap. Make circles with your shoulders, first forward (10 times), then backward (10 times).    4.  Reach:  Hold your arms straight out in front of you at shoulder level.  Without moving your head or body, reach forward.  You should feel your shoulders move and a stretch in the muscles between your neck and shoulders.  Hold.      Trismus protocol:  5x/day hold for 30 seconds.  Do 5 in each set:    Digital passive stretch.  Myofascial release maneuver (index finger along teeth into joint)  Again, go to the point of the stretch, not pain.

## 2023-02-03 ENCOUNTER — PATIENT MESSAGE (OUTPATIENT)
Dept: SPEECH THERAPY | Facility: HOSPITAL | Age: 28
End: 2023-02-03
Payer: COMMERCIAL

## 2023-02-04 NOTE — PROGRESS NOTES
"REFERRING PHYSICIAN:  Ricky Blanco M.D. Radiation Oncologist    REASON FOR REFERRAL:    Trismus    Ms. Yoana Huertas, age 27, was seen for a evaluation of trismus per Dr. Ricky Blanco, radiation oncologist.  She was unaccompanied.      Ms. 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  She is s/p L parotid excision with rotation flap  3/21/22 and LND  4/20/22 with Dr. Edel Hall, head and neck surgical oncologist.  This was followed with XRT (60 Gy to parotid operative bed and 54 Gy to L neck) with Dr. Blanco, completed 7/18/22.    Ms. Thomas has a history of a keloid of the left earlobe treated with RT in approximately 2016 in [place] ([facility]) per Dr. Blanco's history.     Ms. Thomas reported onset of trismus symptoms in late November and stated they were worse when the weather is cold and her jaw is generally stiffer in the mornings.  She denied any effect of food/liquid temperature.  She reported tenderness to touch ("hurts like it is bruised") in the area just inferior/posterior to her ear.  She reported that her jaw stiffens when yawning and has a natural stopping point when she is attempting to open widely for biting something like a sandwich.  She stated that she can volitionally move past that point, but it always stops.    She had steroid shots along surgical scar to inhibit keyloids to which she reported she is prone.  She stated that it helped.    MEDICAL HISTORY:  Past Medical History:   Diagnosis Date    Anxiety     Attention deficit hyperactivity disorder (ADHD), predominantly inattentive type 06/14/2021     of psychiatric care     Therapy      Patient Active Problem List   Diagnosis    Generalized anxiety disorder    Attention deficit hyperactivity disorder (ADHD), predominantly inattentive type    Mucoepidermoid carcinoma of salivary gland       SURGICAL HISTORY:  Past Surgical History:   Procedure Laterality Date    CREATION OF MUSCLE " "ROTATIONAL FLAP Left 3/21/2022    Procedure: CREATION, FLAP, MUSCLE ROTATION-SCM;  Surgeon: Edel Hall MD;  [facility];  Service: ENT;  Laterality: Left;    DISSECTION OF NECK Left 4/20/2022    Procedure: DISSECTION, NECK;  Surgeon: Edel Hall MD;  [facility];  Service: ENT;  Laterality: Left;    EXCISION OF PAROTID GLAND Left 3/21/2022    Procedure: EXCISION, PAROTID GLAND-nerve monitoring;  Surgeon: Edel Hall MD;  [facility];  Service: ENT;  Laterality: Left;    KELOID EXCISION      Left ear and she also had radiation Tx    WISDOM TOOTH EXTRACTION         ONCOLOGIC and TREATMENT HISTORY of problem for which patient is referred:  Oncology History   Mucoepidermoid carcinoma of salivary gland   3/21/2022 Surgery    EXCISION, PAROTID GLAND-nerve monitoring (Left)  CREATION, FLAP, MUSCLE ROTATION-SCM (Left)     4/4/2022 Initial Diagnosis    Mucoepidermoid carcinoma of salivary gland     4/4/2022 Cancer Staged    Staging form: Major Salivary Glands, AJCC 8th Edition  - Pathologic stage from 4/4/2022: Stage III (pT2, pN1, cM0)        Radiation Therapy    Treating physician: Alli Blanco  Total Dose: 60 Gy  Fractions: 30    Treatment Summary  Course: C1 H&N 2022  Treatment Site Ref. ID Energy Dose/Fx (Gy) #Fx Dose Correction (Gy) Total Dose (Gy) Start Date End Date Elapsed Days   IM [facility] Head&Neck 6X 2 30 / 30 0 60 6/6/2022 7/18/2022 42            Possible side effects related to treatment:  Xerostomia:  Positive.  Drinks about 1/2 gallon (64 oz) water/day.  Denied that she wakes at night with dry mouth.  Irritation:  Resolved.  Changes in taste:  Positive.  All sodas taste like Diet Coke ("joshua sugar taste").  Improving taste of actual sugar.  Lymphedema:  Negative  Radiation fibrosis:  Negative.    DENTAL HISTORY and status:  No previous issues with TMJ.  Currently, if experiencing symptoms, last molar on L will bother her.  Bite within normal " limits     MEDICATIONS that may contribute to presenting problem:  None    PAIN HISTORY:  Headaches:  Negative  Jaw:  Positive; main source  Neck:  Positive; secondary source    SPEECH AND SWALLOWING HISTORY:  Speech within normal limits.  Probiotic gummy sometimes leaves residue of small pieces.  Avoids bread due to TM/trismus issues.  Reported her throat is still sensitive and things are still swollen.    Dr. Blanco's flexible nasendoscopy of 1/19/23 revealed:  Findings:   Vocal cords were visualized bilaterally with normal function. Mild swelling of left AE fold without mucosal lesion appreciated in the oropharynx, larynx, or hypopharynx. No bleeding. Airway is patent     Quality of life measurements  Mandibular Function Impairment Questionnaire (MFIQ)  0 - No difficulty:  social activies, drinking, kissing, eating nuts or an apple  1 - Little difficulty:   chewing hard, soft, and resistant foods; work and/or daily activities; eating a hard cookie or raw carrott  2 - Quite a bit of difficulty:  speaking (if talks a lot, hurst the next day), taking a large bite, laughing, eating meat (related to size of bite, primarily, and work of chewing)  3 - Much difficulty:  yawing, eating [place] bread (it's a large bite; hard to swallow due to dry mouth)  4 - Very difficult/impossible without help:  none    SOCIAL HISTORY:  Ms. Thomas is single and lives in [place].  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     BEHAVIOR:  Ms. Thomas was a very pleasant young woman with appropriate affect and social interaction for situation and setting.  She was fully cooperative for assessment and introduction to therapeutic intervention.  Results are considered indicative of her current levels of functioning.    ASSESSMENT:  Screening:  Three finger test: deferred    Therabite tool:     Norms (Luis & Akanksha, 2002)  * Active Opening      _36_____mm  - Jaw opening 49-56mm  * Passive Opening    _deferred   * Lateral Movement  _deferred   - Laterotrusion 10-11mm  * Retraction              _deferred - Retrusion 0-1mm    * Protrusion              _deferred  - Protrusion 10-11mm    Hypomobility  - <40mm (Bitler et. al., 1991)  - <35 (Stephanie et.al.. (2006)  Influencing Factors  - Dental alignment -- appeared within normal limits for speech and swallowing purposes  - Age -- not considered a factore  -- Gender -- not considered a factor  - Ramus length -- not considered a factor  -- Gonial angle -- not considered a factor      Manual Functional Analysis/Palpation  Screen neck mobility  At rest and   Joint  Muscles of mastication  With movement  Joint  Muscles of mastication  In both cases, symmetric.  No popping or other noises.      Oral Peripheral:  Subjectively, within normal limits.  L neck with ND scar which was mostly minimal with a few spots with denser tissue to palpation.    Speech:  within normal limits with no distortions; 100% intelligible in conversation    Swallowing:  Deferred direct assessment.     Voice/Resonance:  Voice quality reported as different to Ms. Thomas.  Difficulty for clinician to appreciate, but there was some persistent minor swelling in the larynx per Dr. Blanco's recent nasendoscopic exam.  Resonance within normal limits subjectively.    THERAPEUTIC INTERVENTION:  Introduced digital passive oral aperture stretching using 5-5-30 protocol which Ms. Thomas was able to execute and tolerate well.  Also introduced MFR maneuver to internal joint using same protocol and this was also well tolerated.  Stripping maneuver to L neck was more tolerable if she performed it herself, but stretching maneuver was better tolerated.  Also added pertinent neck stretches. (See AVS for detailed instructions.)    IMPRESSIONS:  This 28 yo woman appears to present with  Mild trismus and tenderness/pain in the area of her L parotid excision, rotation flap, and ND.  Mild oral phase dysphagia  secondary to above, affecting ability to take large bites and/or masticate dense foods.  History XRT to L parotid operative bed and neck with XRT-associated toxicities including xerostomia and dysgeusia; complete July 2022.  History L parotid excision, rotation flap (March 2022), and ND (April 2022).  History XRT to L earlobe to treat keyloid (~2016; [place]).        RECOMMENDATIONS/PLAN OF CARE:  It is felt that Ms. Thomas would benefit from   ST on a 1x/week basis for 6-8 weeks with a home program to address trismus and oral phase dysphagia.  Monitoring of voice quality.  Continuation of her current regular consistency diet as tolerated with thin liquids using the following strategies and common aspiration precautions, including, but not limited to  A.  Appropriate upright seating for all eating and drinking.  B.  Use of small bites and thorough mastication of denser foods.  C.  Addition of small volumes of liquids to bites of drier foods and/or liquid wash to help progress.  D.  Consider toasting breads to see if this facilitates better breakdown and clearance during swallowing.  E.  Monitoring for any signs/symptoms of aspiration (such as wet/gurgly voice that does not clear with coughing, inability to make any voice sounds, any persistent coughing with oral intake, otherwise unexplained fever, unexplained increased or new difficulty or discomfort breathing, unexplained increase in sleepiness/lethargy/significant fatigue, unexplained increase or new onset confusion or change in cognitive functioning, or any other unexplained change in health or well-being that could be related to swallowing).   4.  Continued f/u with Charity Blanco and Isabel as directed.    Long-term goals:  Ms. Mina will  Be able to achieve an oral aperture of 40 mm.  Have reduced or eliminated pain/discomfort with daily activities including talking, laughing, yawning, and eating    Short-term objectives:  Ms. Thomas will perform the  following with % consistency/accuracy:  Digital passive oral aperture stretch, using 5-5-30 protocol.  MFR maneuvers:  Internal TM stretch using 5-5-30 protocol  L neck stretch or stripping  3.  Neck stretches  4.  Home program per patient report

## 2023-02-04 NOTE — PLAN OF CARE
IMPRESSIONS:  This 26 yo woman appears to present with  Mild trismus and tenderness/pain in the area of her L parotid excision, rotation flap, and ND.  Mild oral phase dysphagia secondary to above, affecting ability to take large bites and/or masticate dense foods.  History XRT to L parotid operative bed and neck with XRT-associated toxicities including xerostomia and dysgeusia; complete July 2022.  History L parotid excision, rotation flap (March 2022), and ND (April 2022).  History XRT to L earlobe to treat keyloid (~2016; [place]).        RECOMMENDATIONS/PLAN OF CARE:  It is felt that Ms. Thomas would benefit from   ST on a 1x/week basis for 6-8 weeks with a home program to address trismus and oral phase dysphagia.  Monitoring of voice quality.  Continuation of her current regular consistency diet as tolerated with thin liquids using the following strategies and common aspiration precautions, including, but not limited to  A.  Appropriate upright seating for all eating and drinking.  B.  Use of small bites and thorough mastication of denser foods.  C.  Addition of small volumes of liquids to bites of drier foods and/or liquid wash to help progress.  D.  Consider toasting breads to see if this facilitates better breakdown and clearance during swallowing.  E.  Monitoring for any signs/symptoms of aspiration (such as wet/gurgly voice that does not clear with coughing, inability to make any voice sounds, any persistent coughing with oral intake, otherwise unexplained fever, unexplained increased or new difficulty or discomfort breathing, unexplained increase in sleepiness/lethargy/significant fatigue, unexplained increase or new onset confusion or change in cognitive functioning, or any other unexplained change in health or well-being that could be related to swallowing).   4.  Continued f/u with Charity Blanco and Isabel as directed.    Long-term goals:  Ms. Mina will  Be able to achieve an oral aperture of  40 mm.  Have reduced or eliminated pain/discomfort with daily activities including talking, laughing, yawning, and eating    Short-term objectives:  Ms. Thomas will perform the following with % consistency/accuracy:  Digital passive oral aperture stretch, using 5-5-30 protocol.  MFR maneuvers:  Internal TM stretch using 5-5-30 protocol  L neck stretch or stripping  3.  Neck stretches  4.  Home program per patient report

## 2023-02-10 ENCOUNTER — TELEPHONE (OUTPATIENT)
Dept: OTOLARYNGOLOGY | Facility: CLINIC | Age: 28
End: 2023-02-10
Payer: COMMERCIAL

## 2023-02-13 ENCOUNTER — OFFICE VISIT (OUTPATIENT)
Dept: OTOLARYNGOLOGY | Facility: CLINIC | Age: 28
End: 2023-02-13
Payer: COMMERCIAL

## 2023-02-13 VITALS
SYSTOLIC BLOOD PRESSURE: 110 MMHG | HEIGHT: 62 IN | DIASTOLIC BLOOD PRESSURE: 79 MMHG | BODY MASS INDEX: 35.66 KG/M2 | HEART RATE: 69 BPM | WEIGHT: 193.81 LBS

## 2023-02-13 DIAGNOSIS — C08.9 MUCOEPIDERMOID CARCINOMA OF SALIVARY GLAND: Primary | ICD-10-CM

## 2023-02-13 PROCEDURE — 1159F MED LIST DOCD IN RCRD: CPT | Mod: CPTII,S$GLB,, | Performed by: OTOLARYNGOLOGY

## 2023-02-13 PROCEDURE — 3008F BODY MASS INDEX DOCD: CPT | Mod: CPTII,S$GLB,, | Performed by: OTOLARYNGOLOGY

## 2023-02-13 PROCEDURE — 99999 PR PBB SHADOW E&M-EST. PATIENT-LVL II: ICD-10-PCS | Mod: PBBFAC,,, | Performed by: OTOLARYNGOLOGY

## 2023-02-13 PROCEDURE — 3074F PR MOST RECENT SYSTOLIC BLOOD PRESSURE < 130 MM HG: ICD-10-PCS | Mod: CPTII,S$GLB,, | Performed by: OTOLARYNGOLOGY

## 2023-02-13 PROCEDURE — 3074F SYST BP LT 130 MM HG: CPT | Mod: CPTII,S$GLB,, | Performed by: OTOLARYNGOLOGY

## 2023-02-13 PROCEDURE — 3008F PR BODY MASS INDEX (BMI) DOCUMENTED: ICD-10-PCS | Mod: CPTII,S$GLB,, | Performed by: OTOLARYNGOLOGY

## 2023-02-13 PROCEDURE — 99214 PR OFFICE/OUTPT VISIT, EST, LEVL IV, 30-39 MIN: ICD-10-PCS | Mod: S$GLB,,, | Performed by: OTOLARYNGOLOGY

## 2023-02-13 PROCEDURE — 3078F DIAST BP <80 MM HG: CPT | Mod: CPTII,S$GLB,, | Performed by: OTOLARYNGOLOGY

## 2023-02-13 PROCEDURE — 99999 PR PBB SHADOW E&M-EST. PATIENT-LVL II: CPT | Mod: PBBFAC,,, | Performed by: OTOLARYNGOLOGY

## 2023-02-13 PROCEDURE — 3078F PR MOST RECENT DIASTOLIC BLOOD PRESSURE < 80 MM HG: ICD-10-PCS | Mod: CPTII,S$GLB,, | Performed by: OTOLARYNGOLOGY

## 2023-02-13 PROCEDURE — 99214 OFFICE O/P EST MOD 30 MIN: CPT | Mod: S$GLB,,, | Performed by: OTOLARYNGOLOGY

## 2023-02-13 PROCEDURE — 1159F PR MEDICATION LIST DOCUMENTED IN MEDICAL RECORD: ICD-10-PCS | Mod: CPTII,S$GLB,, | Performed by: OTOLARYNGOLOGY

## 2023-02-13 NOTE — PROGRESS NOTES
Subjective:       Patient ID: Yoana Thomas is a 27 y.o. female.    Oncology History   Mucoepidermoid carcinoma of salivary gland   3/21/2022 Surgery    EXCISION, PAROTID GLAND-nerve monitoring (Left)  CREATION, FLAP, MUSCLE ROTATION-SCM (Left)     4/4/2022 Initial Diagnosis    Mucoepidermoid carcinoma of salivary gland     4/4/2022 Cancer Staged    Staging form: Major Salivary Glands, AJCC 8th Edition  - Pathologic stage from 4/4/2022: Stage III (pT2, pN1, cM0)        Radiation Therapy    Treating physician: Alli Blanco  Total Dose: 60 Gy  Fractions: 30    Treatment Summary  Course: C1 H&N 2022  Treatment Site Ref. ID Energy Dose/Fx (Gy) #Fx Dose Correction (Gy) Total Dose (Gy) Start Date End Date Elapsed [facility]   [facility] / 30 0 60 6/6/2022 7/18/2022 42          Chief Complaint: post op  HPI     Yoana Thomas returns for surveillance. Completed XRT 7/18/22. Doing well. Taste mostly returned. Previous stitch abscess site now well healed. Working with speech therapy for mild trismus with improvement. No current pain or areas of swelling or fluid collection.    Past Medical History:   Diagnosis Date    Anxiety     Attention deficit hyperactivity disorder (ADHD), predominantly inattentive type 06/14/2021    Hx of psychiatric care     Therapy        Past Surgical History:   Procedure Laterality Date    CREATION OF MUSCLE ROTATIONAL FLAP Left 3/21/2022    Procedure: CREATION, FLAP, MUSCLE ROTATION-SCM;  Surgeon: Edel Hall MD;  Location: [facility] OR [address] [place] [address];  Service: ENT;  Laterality: Left;    DISSECTION OF NECK Left 4/20/2022    Procedure: DISSECTION, NECK;  Surgeon: Edel Hall MD;  Location: [facility] OR [address] [place] [address];  Service: ENT;  Laterality: Left;    EXCISION OF PAROTID GLAND Left 3/21/2022    Procedure: EXCISION, PAROTID GLAND-nerve monitoring;  Surgeon: Edel Hall MD;  Location: [facility] OR [address] [place] [address];  Service: ENT;  Laterality: Left;    KELOID EXCISION      Left ear  and she also had radiation Tx    WISDOM TOOTH EXTRACTION           Current Outpatient Medications:     paroxetine (PAXIL) 40 MG tablet, Take 1 tablet (40 mg total) by mouth once daily., Disp: 90 tablet, Rfl: 3    loratadine (CLARITIN) 10 mg tablet, Take 1 tablet (10 mg total) by mouth once daily., Disp: 30 tablet, Rfl: 2    Review of patient's allergies indicates:  No Known Allergies    Social History     Socioeconomic History    Marital status: Single   Tobacco Use    Smoking status: Never     Passive exposure: Never    Smokeless tobacco: Never   Substance and Sexual Activity    Alcohol use: Yes     Comment: once every couple mos.     Drug use: Never    Sexual activity: Not Currently     Partners: Male     Birth control/protection: Condom   Social History Narrative    Works for [employer])    Originally from [place]       Family History   Problem Relation Age of Onset    Skin cancer Father     Hypertension Father     Infertility Sister     Diabetes type II Maternal Grandfather     Diabetes type I Maternal Grandmother     Heart disease Maternal Grandmother     Kidney failure Maternal Grandmother         ESRD    Lymphoma Paternal Grandfather        Review of Systems   Constitutional: Negative.    HENT: Negative.    Eyes: Negative.    Respiratory: Negative.    Cardiovascular: Negative.    Gastrointestinal: Negative.    Endocrine: Negative.    Genitourinary: Negative.    Musculoskeletal: Negative.    Integumentary:  Negative.   Allergic/Immunologic: Negative.    Neurological: Negative.    Hematological: Negative.    Psychiatric/Behavioral: Negative.          Objective:      Physical Exam  Constitutional:       Appearance: Normal appearance.   HENT:      Head: Normocephalic and atraumatic.      Right Ear: External ear normal.      Left Ear: External ear normal.   Neck:      Comments: Modified Jose and neck dissection incision CDI.  No redness, drainage, or fluid collection noted. No parotid or neck  mass to palpation. Previous I&D site well healed.  Pulmonary:      Effort: Pulmonary effort is normal. No respiratory distress.   Neurological:      General: No focal deficit present.      Mental Status: She is alert.   Psychiatric:         Mood and Affect: Mood normal.         Behavior: Behavior normal.         Thought Content: Thought content normal.         Assessment:       Problem List Items Addressed This Visit          Oncology    Mucoepidermoid carcinoma of salivary gland - Primary               Plan:       Problem List Items Addressed This Visit          Oncology    Mucoepidermoid carcinoma of salivary gland - Primary             T2N1M0 mucoepidermoid carcinoma, intermediate grade, of the left parotid. Completed postoperative XRT 7/18/22. Doing well, HORACIO. Surveillance Chest CT scheduled. Return to clinic in 6 weeks for surveillance or sooner as needed.

## 2023-02-15 ENCOUNTER — TELEPHONE (OUTPATIENT)
Dept: SPEECH THERAPY | Facility: HOSPITAL | Age: 28
End: 2023-02-15
Payer: COMMERCIAL

## 2023-02-22 ENCOUNTER — OFFICE VISIT (OUTPATIENT)
Dept: PSYCHIATRY | Facility: CLINIC | Age: 28
End: 2023-02-22
Payer: COMMERCIAL

## 2023-02-22 DIAGNOSIS — F41.1 GENERALIZED ANXIETY DISORDER: Primary | ICD-10-CM

## 2023-02-22 PROCEDURE — 90834 PSYTX W PT 45 MINUTES: CPT | Mod: S$GLB,,, | Performed by: PSYCHOLOGIST

## 2023-02-22 PROCEDURE — 90834 PR PSYCHOTHERAPY W/PATIENT, 45 MIN: ICD-10-PCS | Mod: S$GLB,,, | Performed by: PSYCHOLOGIST

## 2023-02-22 PROCEDURE — 1160F RVW MEDS BY RX/DR IN RCRD: CPT | Mod: CPTII,S$GLB,, | Performed by: PSYCHOLOGIST

## 2023-02-22 PROCEDURE — 99999 PR PBB SHADOW E&M-EST. PATIENT-LVL II: CPT | Mod: PBBFAC,,, | Performed by: PSYCHOLOGIST

## 2023-02-22 PROCEDURE — 99999 PR PBB SHADOW E&M-EST. PATIENT-LVL II: ICD-10-PCS | Mod: PBBFAC,,, | Performed by: PSYCHOLOGIST

## 2023-02-22 PROCEDURE — 1160F PR REVIEW ALL MEDS BY PRESCRIBER/CLIN PHARMACIST DOCUMENTED: ICD-10-PCS | Mod: CPTII,S$GLB,, | Performed by: PSYCHOLOGIST

## 2023-02-22 PROCEDURE — 1159F MED LIST DOCD IN RCRD: CPT | Mod: CPTII,S$GLB,, | Performed by: PSYCHOLOGIST

## 2023-02-22 PROCEDURE — 1159F PR MEDICATION LIST DOCUMENTED IN MEDICAL RECORD: ICD-10-PCS | Mod: CPTII,S$GLB,, | Performed by: PSYCHOLOGIST

## 2023-02-22 NOTE — PROGRESS NOTES
PSYCHO-ONCOLOGY NOTE/ Individual Psychotherapy     Date: 2/22/2023   Site:  [facility]        Therapeutic Intervention: Met with patient.  Outpatient - Behavior modifying psychotherapy 45 min - CPT code 64644  The patient's last visit with me was on 1/23/2023.    Problem list  Patient Active Problem List   Diagnosis    Generalized anxiety disorder    Attention deficit hyperactivity disorder (ADHD), predominantly inattentive type    Mucoepidermoid carcinoma of salivary gland       Chief complaint/reason for encounter: adjustment, anxiety   Met with patient to evaluate psychosocial adaptation to diagnosis/treatment/survivorship of parotid cancer    Current Medications  Current Outpatient Medications   Medication    loratadine (CLARITIN) 10 mg tablet    paroxetine (PAXIL) 40 MG tablet     No current facility-administered medications for this visit.       Objective:  Yoana Thomas arrived promptly for the session.  Ms. Thomas was independently ambulatory at the time of session. The patient was fully cooperative throughout the session.  Appearance: age appropriate, casually  dressed, well groomed  Behavior/Cooperation: friendly and cooperative  Speech: normal in rate, volume, and tone and appropriate quality, quantity and organization of sentences  Mood:euthymic  Affect: euthymic  Thought Process: goal-directed, logical  Thought Content: normal,  No delusions or paranoia; did not appear to be responding to internal stimuli during the session  Orientation: grossly intact  Memory: Grossly intact  Attention Span/Concentration: Attends to session without distraction; reports no difficulty  Fund of Knowledge: average  Estimate of Intelligence: above average from verbal skills and history  Cognition: grossly intact  Insight: patient has awareness of illness; good insight into own behavior and behavior of others  Judgment: the patient's behavior is adequate to circumstances    Interval history and content of current  "session: Patient discussed events and activities since the time of last visit. Discussed current adaptation to disease and treatment status. Reports to be coping with improvement. She does report having some anxiety due to upcoming scans and 1 year anniversaries. Discussed positive and negative life changes during the past year due to cancer.    Discussed her work burnout and growing thoughts of transitioning to a new employer within the next year. Discussed thinking about healthy work environments.    Tried to bid on a condo (did not do so due to insurance constraints, but still looking)    Eating healthily, working out, drinking water to improve "control over my body."     Risk parameters:   Patient reports no suicidal ideation  Patient reports no homicidal ideation  Patient reports no self-injurious behavior  Patient reports no violent behavior   Safety needs:  None at this time      Verbal deficits: None     Patient's response to intervention:The patient's response to intervention is accepting, motivated.     Progress toward goals: Progress as Expected        Patient reported outcomes:      Distress thermometer:   DISTRESS SCREENING 2/22/2023 2/13/2023 1/23/2023 1/19/2023 1/3/2023 12/15/2022 11/15/2022   Distress Score 0 - No Distress 0 - No Distress 0 - No Distress 0 - No Distress 0 - No Distress 0 - No Distress 1   Practical Problems None of these None of these None of these None of these None of these None of these None of these   Family Problems None of these None of these None of these None of these None of these None of these None of these   Emotional Problems None of these None of these None of these None of these None of these Worry;None of these None of these   Spiritual / Adventism Concerns No No No No No No No   Physical Problems Fatigue None of these Appearance;Feeling Swollen None of these None of these Appearance None of these           PHQ-9= Initial visit: 4  PHQ ANSWERS    Q1. Little interest or " pleasure in doing things: Not at all (02/22/23 1153)  Q2. Feeling down, depressed, or hopeless: Not at all (02/22/23 1153)  Q3. Trouble falling or staying asleep, or sleeping too much: Not at all (02/22/23 1153)  Q4. Feeling tired or having little energy: Several days (02/22/23 1153)  Q5. Poor appetite or overeating: Not at all (02/22/23 1153)  Q6. Feeling bad about yourself - or that you are a failure or have let yourself or your family down: Not at all (02/22/23 1153)  Q7. Trouble concentrating on things, such as reading the newspaper or watching television: Not at all (02/22/23 1153)  Q8. Moving or speaking so slowly that other people could have noticed. Or the opposite - being so fidgety or restless that you have been moving around a lot more than usual: Not at all (02/22/23 1153)  Q9. Thoughts that you would be better off dead, or of hurting yourself in some way: Not at all (02/22/23 1153)    PHQ8 Score : 1 (02/22/23 1153)  PHQ-9 Total Score: 1 (02/22/23 1153)      FABIAN-7= Initial visit:6   GAD7 2/22/2023 1/23/2023 12/15/2022   1. Feeling nervous, anxious, or on edge? 1 1 1   2. Not being able to stop or control worrying? 0 0 1   3. Worrying too much about different things? 1 1 1   4. Trouble relaxing? 0 0 0   5. Being so restless that it is hard to sit still? 0 0 0   6. Becoming easily annoyed or irritable? 1 1 1   7. Feeling afraid as if something awful might happen? 0 0 0   FABIAN-7 Score 3 3 4          Client Strengths: verbal, intelligent, successful, good social support, good insight, commitment to wellness,       Treatment Plan:individual psychotherapy  Target symptoms: anxiety   Why chosen therapy is appropriate versus another modality: relevant to diagnosis, patient responds to this modality, evidence based practice  Outcome monitoring methods: self-report, checklist/rating scale  Therapeutic intervention type: behavior modifying psychotherapy  Prognosis: Excellent    Goals from last visit:  Met   Behavioral goals prior to next visit:    Exercise: continue time at gym   Stress management:    Social engagement:  consider ways to make new friends   Nutrition:   Smoking Cessation:   Therapy:  monitor thoughts and write challenges    Passport     Return to clinic: 1 month     Length of Service (minutes direct face-to-face contact): 45    Diagnosis:     ICD-10-CM ICD-9-CM   1. Generalized anxiety disorder  F41.1 300.02         Marcel Barroso, PhD  [place] License #[id number]  [place] License #[id number]

## 2023-03-24 ENCOUNTER — OFFICE VISIT (OUTPATIENT)
Dept: PSYCHIATRY | Facility: CLINIC | Age: 28
End: 2023-03-24
Payer: COMMERCIAL

## 2023-03-24 DIAGNOSIS — F41.1 GENERALIZED ANXIETY DISORDER: Primary | ICD-10-CM

## 2023-03-24 PROCEDURE — 1160F PR REVIEW ALL MEDS BY PRESCRIBER/CLIN PHARMACIST DOCUMENTED: ICD-10-PCS | Mod: CPTII,S$GLB,, | Performed by: PSYCHOLOGIST

## 2023-03-24 PROCEDURE — 1159F PR MEDICATION LIST DOCUMENTED IN MEDICAL RECORD: ICD-10-PCS | Mod: CPTII,S$GLB,, | Performed by: PSYCHOLOGIST

## 2023-03-24 PROCEDURE — 1160F RVW MEDS BY RX/DR IN RCRD: CPT | Mod: CPTII,S$GLB,, | Performed by: PSYCHOLOGIST

## 2023-03-24 PROCEDURE — 90834 PSYTX W PT 45 MINUTES: CPT | Mod: S$GLB,,, | Performed by: PSYCHOLOGIST

## 2023-03-24 PROCEDURE — 1159F MED LIST DOCD IN RCRD: CPT | Mod: CPTII,S$GLB,, | Performed by: PSYCHOLOGIST

## 2023-03-24 PROCEDURE — 90834 PR PSYCHOTHERAPY W/PATIENT, 45 MIN: ICD-10-PCS | Mod: S$GLB,,, | Performed by: PSYCHOLOGIST

## 2023-03-24 PROCEDURE — 99999 PR PBB SHADOW E&M-EST. PATIENT-LVL II: ICD-10-PCS | Mod: PBBFAC,,, | Performed by: PSYCHOLOGIST

## 2023-03-24 PROCEDURE — 99999 PR PBB SHADOW E&M-EST. PATIENT-LVL II: CPT | Mod: PBBFAC,,, | Performed by: PSYCHOLOGIST

## 2023-03-24 NOTE — PROGRESS NOTES
PSYCHO-ONCOLOGY NOTE/ Individual Psychotherapy     Date: 3/24/2023   Site:  [facility]        Therapeutic Intervention: Met with patient.  Outpatient - Behavior modifying psychotherapy 45 min - CPT code 81606  The patient's last visit with me was on 2/22/2023.      Problem list  Patient Active Problem List   Diagnosis    Generalized anxiety disorder    Attention deficit hyperactivity disorder (ADHD), predominantly inattentive type    Mucoepidermoid carcinoma of salivary gland       Chief complaint/reason for encounter: adjustment, anxiety   Met with patient to evaluate psychosocial adaptation to diagnosis/treatment/survivorship of parotid cancer    Current Medications  Current Outpatient Medications   Medication    loratadine (CLARITIN) 10 mg tablet    paroxetine (PAXIL) 40 MG tablet     No current facility-administered medications for this visit.       Objective:  Yoana Thomas arrived promptly for the session.  Ms. Thomas was independently ambulatory at the time of session. The patient was fully cooperative throughout the session.  Appearance: age appropriate, casually  dressed, well groomed  Behavior/Cooperation: friendly and cooperative  Speech: normal in rate, volume, and tone and appropriate quality, quantity and organization of sentences  Mood:euthymic  Affect: euthymic  Thought Process: goal-directed, logical  Thought Content: normal,  No delusions or paranoia; did not appear to be responding to internal stimuli during the session  Orientation: grossly intact  Memory: Grossly intact  Attention Span/Concentration: Attends to session without distraction; reports no difficulty  Fund of Knowledge: average  Estimate of Intelligence: above average from verbal skills and history  Cognition: grossly intact  Insight: patient has awareness of illness; good insight into own behavior and behavior of others  Judgment: the patient's behavior is adequate to circumstances    Interval history and content of  "current session: Patient discussed events and activities since the time of last visit. Discussed current adaptation to disease and treatment status. Reports to be coping with improvement. She does report having some anxiety due to upcoming scans. Discussed re-evaluation of her life goals. Volunteer work ([employer] in cancer [employer]) is a goal.    Discussed her changes in thinking about her work, which have led to decreased work burnout (not focusing only on negatives, recognizing boss's tendencies are not personal, "leaving work at work").     Eating healthily, working out, drinking water to improve "control over my body."    Discussed PRAISES     Risk parameters:   Patient reports no suicidal ideation  Patient reports no homicidal ideation  Patient reports no self-injurious behavior  Patient reports no violent behavior   Safety needs:  None at this time      Verbal deficits: None     Patient's response to intervention:The patient's response to intervention is accepting, motivated.     Progress toward goals: Progress as Expected        Patient reported outcomes:      Distress thermometer:   DISTRESS SCREENING 3/24/2023 3/24/2023 2/22/2023 2/13/2023 1/23/2023 1/19/2023 1/3/2023   Distress Score 3 3 0 - No Distress 0 - No Distress 0 - No Distress 0 - No Distress 0 - No Distress   Practical Problems None of these None of these None of these None of these None of these None of these None of these   Family Problems None of these None of these None of these None of these None of these None of these None of these   Emotional Problems None of these None of these None of these None of these None of these None of these None of these   Spiritual / Yazidi Concerns No No No No No No No   Physical Problems Appearance;Feeling Swollen Appearance;Feeling Swollen Fatigue None of these Appearance;Feeling Swollen None of these None of these           PHQ-9= Initial visit: 4  PHQ ANSWERS    Q1. Little interest or pleasure in doing things: " Not at all (03/24/23 1452)  Q2. Feeling down, depressed, or hopeless: Not at all (03/24/23 1452)  Q3. Trouble falling or staying asleep, or sleeping too much: Not at all (03/24/23 1452)  Q4. Feeling tired or having little energy: Not at all (03/24/23 1452)  Q5. Poor appetite or overeating: Not at all (03/24/23 1452)  Q6. Feeling bad about yourself - or that you are a failure or have let yourself or your family down: Not at all (03/24/23 1452)  Q7. Trouble concentrating on things, such as reading the newspaper or watching television: Not at all (03/24/23 1452)  Q8. Moving or speaking so slowly that other people could have noticed. Or the opposite - being so fidgety or restless that you have been moving around a lot more than usual: Not at all (03/24/23 1452)  Q9.      PHQ8 Score : 0 (03/24/23 1452)  PHQ-9 Total Score: 0 (03/24/23 1452)      FABIAN-7= Initial visit:6   GAD7 3/24/2023 2/22/2023 1/23/2023   1. Feeling nervous, anxious, or on edge? 1 1 1   2. Not being able to stop or control worrying? 1 0 0   3. Worrying too much about different things? 1 1 1   4. Trouble relaxing? 0 0 0   5. Being so restless that it is hard to sit still? 0 0 0   6. Becoming easily annoyed or irritable? 1 1 1   7. Feeling afraid as if something awful might happen? 0 0 0   FABIAN-7 Score 4 3 3          Client Strengths: verbal, intelligent, successful, good social support, good insight, commitment to wellness,       Treatment Plan:individual psychotherapy  Target symptoms: anxiety   Why chosen therapy is appropriate versus another modality: relevant to diagnosis, patient responds to this modality, evidence based practice  Outcome monitoring methods: self-report, checklist/rating scale  Therapeutic intervention type: behavior modifying psychotherapy  Prognosis: Excellent    Goals from last visit: Met   Behavioral goals prior to next visit:    Exercise: continue time at gym   Stress management:    Social engagement:  increased time with  friends    Focus on volunteer opportunities   Nutrition:   Smoking Cessation:   Therapy: pre-coping for scans (next visit is week of 1 year scans)    Return to clinic: 1 month     Length of Service (minutes direct face-to-face contact): 45    Diagnosis:     ICD-10-CM ICD-9-CM   1. Generalized anxiety disorder  F41.1 300.02         Marcel Barroso, PhD  [place] License #[id number]  [place] License #[id number]

## 2023-03-29 ENCOUNTER — OFFICE VISIT (OUTPATIENT)
Dept: OTOLARYNGOLOGY | Facility: CLINIC | Age: 28
End: 2023-03-29
Payer: COMMERCIAL

## 2023-03-29 VITALS
BODY MASS INDEX: 35.3 KG/M2 | WEIGHT: 193 LBS | HEART RATE: 65 BPM | DIASTOLIC BLOOD PRESSURE: 69 MMHG | SYSTOLIC BLOOD PRESSURE: 96 MMHG

## 2023-03-29 DIAGNOSIS — C08.9 MUCOEPIDERMOID CARCINOMA OF SALIVARY GLAND: Primary | ICD-10-CM

## 2023-03-29 PROCEDURE — 3078F PR MOST RECENT DIASTOLIC BLOOD PRESSURE < 80 MM HG: ICD-10-PCS | Mod: CPTII,S$GLB,, | Performed by: OTOLARYNGOLOGY

## 2023-03-29 PROCEDURE — 1159F MED LIST DOCD IN RCRD: CPT | Mod: CPTII,S$GLB,, | Performed by: OTOLARYNGOLOGY

## 2023-03-29 PROCEDURE — 99214 PR OFFICE/OUTPT VISIT, EST, LEVL IV, 30-39 MIN: ICD-10-PCS | Mod: S$GLB,,, | Performed by: OTOLARYNGOLOGY

## 2023-03-29 PROCEDURE — 3078F DIAST BP <80 MM HG: CPT | Mod: CPTII,S$GLB,, | Performed by: OTOLARYNGOLOGY

## 2023-03-29 PROCEDURE — 3008F BODY MASS INDEX DOCD: CPT | Mod: CPTII,S$GLB,, | Performed by: OTOLARYNGOLOGY

## 2023-03-29 PROCEDURE — 99999 PR PBB SHADOW E&M-EST. PATIENT-LVL II: CPT | Mod: PBBFAC,,, | Performed by: OTOLARYNGOLOGY

## 2023-03-29 PROCEDURE — 3074F PR MOST RECENT SYSTOLIC BLOOD PRESSURE < 130 MM HG: ICD-10-PCS | Mod: CPTII,S$GLB,, | Performed by: OTOLARYNGOLOGY

## 2023-03-29 PROCEDURE — 3074F SYST BP LT 130 MM HG: CPT | Mod: CPTII,S$GLB,, | Performed by: OTOLARYNGOLOGY

## 2023-03-29 PROCEDURE — 1159F PR MEDICATION LIST DOCUMENTED IN MEDICAL RECORD: ICD-10-PCS | Mod: CPTII,S$GLB,, | Performed by: OTOLARYNGOLOGY

## 2023-03-29 PROCEDURE — 99214 OFFICE O/P EST MOD 30 MIN: CPT | Mod: S$GLB,,, | Performed by: OTOLARYNGOLOGY

## 2023-03-29 PROCEDURE — 3008F PR BODY MASS INDEX (BMI) DOCUMENTED: ICD-10-PCS | Mod: CPTII,S$GLB,, | Performed by: OTOLARYNGOLOGY

## 2023-03-29 PROCEDURE — 99999 PR PBB SHADOW E&M-EST. PATIENT-LVL II: ICD-10-PCS | Mod: PBBFAC,,, | Performed by: OTOLARYNGOLOGY

## 2023-03-29 NOTE — PROGRESS NOTES
Subjective:       Patient ID: Yoana Thomas is a 28 y.o. female.    Oncology History   Mucoepidermoid carcinoma of salivary gland   3/21/2022 Surgery    EXCISION, PAROTID GLAND-nerve monitoring (Left)  CREATION, FLAP, MUSCLE ROTATION-SCM (Left)     4/4/2022 Initial Diagnosis    Mucoepidermoid carcinoma of salivary gland     4/4/2022 Cancer Staged    Staging form: Major Salivary Glands, AJCC 8th Edition  - Pathologic stage from 4/4/2022: Stage III (pT2, pN1, cM0)        Radiation Therapy    Treating physician: Alli Blanco  Total Dose: 60 Gy  Fractions: 30    Treatment Summary  Course: C1 H&N 2022  Treatment Site Ref. ID Energy Dose/Fx (Gy) #Fx Dose Correction (Gy) Total Dose (Gy) Start Date End Date Elapsed Days   IM HeadNe Head&Neck 6X 2 30 / 30 0 60 6/6/2022 7/18/2022 42          Chief Complaint: post op  HPI     Yoana Thomas returns for surveillance. Completed XRT 7/18/22. Doing well. No issues with scar. Using silicone tape and sunscreen as needed. Starting daily workouts again.        Past Medical History:   Diagnosis Date    Anxiety     Attention deficit hyperactivity disorder (ADHD), predominantly inattentive type 06/14/2021    Hx of psychiatric care     Therapy        Past Surgical History:   Procedure Laterality Date    CREATION OF MUSCLE ROTATIONAL FLAP Left 3/21/2022    Procedure: CREATION, FLAP, MUSCLE ROTATION-SCM;  Surgeon: Edel Hall MD;  Location: [address];  Service: ENT;  Laterality: Left;    DISSECTION OF NECK Left 4/20/2022    Procedure: DISSECTION, NECK;  Surgeon: Edel Hall MD;  Location: [facility] OR [address] [place] [address];  Service: ENT;  Laterality: Left;    EXCISION OF PAROTID GLAND Left 3/21/2022    Procedure: EXCISION, PAROTID GLAND-nerve monitoring;  Surgeon: Edel Hall MD;  Location: [facility] OR [address] [place] [address];  Service: ENT;  Laterality: Left;    KELOID EXCISION      Left ear and she also had radiation Tx    WISDOM TOOTH EXTRACTION           Current Outpatient  Medications:     loratadine (CLARITIN) 10 mg tablet, Take 1 tablet (10 mg total) by mouth once daily., Disp: 30 tablet, Rfl: 2    paroxetine (PAXIL) 40 MG tablet, Take 1 tablet (40 mg total) by mouth once daily., Disp: 90 tablet, Rfl: 3    Review of patient's allergies indicates:  No Known Allergies    Social History     Socioeconomic History    Marital status: Single   Tobacco Use    Smoking status: Never     Passive exposure: Never    Smokeless tobacco: Never   Substance and Sexual Activity    Alcohol use: Yes     Comment: once every couple mos.     Drug use: Never    Sexual activity: Not Currently     Partners: Male     Birth control/protection: Condom   Social History Narrative    Works for [employer]    Originally from [place]       Family History   Problem Relation Age of Onset    Skin cancer Father     Hypertension Father     Infertility Sister     Diabetes type II Maternal Grandfather     Diabetes type I Maternal Grandmother     Heart disease Maternal Grandmother     Kidney failure Maternal Grandmother         ESRD    Lymphoma Paternal Grandfather        Review of Systems   Constitutional: Negative.    HENT: Negative.    Eyes: Negative.    Respiratory: Negative.    Cardiovascular: Negative.    Gastrointestinal: Negative.    Endocrine: Negative.    Genitourinary: Negative.    Musculoskeletal: Negative.    Integumentary:  Negative.   Allergic/Immunologic: Negative.    Neurological: Negative.    Hematological: Negative.    Psychiatric/Behavioral: Negative.          Objective:      Physical Exam  Constitutional:       Appearance: Normal appearance.   HENT:      Head: Normocephalic and atraumatic.      Right Ear: External ear normal.      Left Ear: External ear normal.   Neck:      Comments: Modified Jose and neck dissection incision CDI.  No redness, drainage, or fluid collection noted. No parotid or neck mass to palpation. Previous I&D site well healed.  Pulmonary:      Effort: Pulmonary  effort is normal. No respiratory distress.   Neurological:      General: No focal deficit present.      Mental Status: She is alert.   Psychiatric:         Mood and Affect: Mood normal.         Behavior: Behavior normal.         Thought Content: Thought content normal.         Assessment:       Problem List Items Addressed This Visit          Oncology    Mucoepidermoid carcinoma of salivary gland - Primary                 Plan:       Problem List Items Addressed This Visit          Oncology    Mucoepidermoid carcinoma of salivary gland - Primary               T2N1M0 mucoepidermoid carcinoma, intermediate grade, of the left parotid. Completed postoperative XRT 7/18/22. Doing well, HORACIO. Surveillance Chest CT scheduled next month. Return to clinic in 6 weeks for surveillance or sooner as needed.

## 2023-03-30 ENCOUNTER — TELEPHONE (OUTPATIENT)
Dept: OTOLARYNGOLOGY | Facility: CLINIC | Age: 28
End: 2023-03-30
Payer: COMMERCIAL

## 2023-03-30 ENCOUNTER — PATIENT MESSAGE (OUTPATIENT)
Dept: OTOLARYNGOLOGY | Facility: CLINIC | Age: 28
End: 2023-03-30
Payer: COMMERCIAL

## 2023-04-12 ENCOUNTER — PATIENT MESSAGE (OUTPATIENT)
Dept: OTOLARYNGOLOGY | Facility: CLINIC | Age: 28
End: 2023-04-12
Payer: COMMERCIAL

## 2023-04-18 ENCOUNTER — OFFICE VISIT (OUTPATIENT)
Dept: PSYCHIATRY | Facility: CLINIC | Age: 28
End: 2023-04-18
Payer: COMMERCIAL

## 2023-04-18 DIAGNOSIS — F41.1 GENERALIZED ANXIETY DISORDER: Primary | ICD-10-CM

## 2023-04-18 PROCEDURE — 99999 PR PBB SHADOW E&M-EST. PATIENT-LVL II: ICD-10-PCS | Mod: PBBFAC,,, | Performed by: PSYCHOLOGIST

## 2023-04-18 PROCEDURE — 99999 PR PBB SHADOW E&M-EST. PATIENT-LVL II: CPT | Mod: PBBFAC,,, | Performed by: PSYCHOLOGIST

## 2023-04-18 PROCEDURE — 1160F PR REVIEW ALL MEDS BY PRESCRIBER/CLIN PHARMACIST DOCUMENTED: ICD-10-PCS | Mod: CPTII,S$GLB,, | Performed by: PSYCHOLOGIST

## 2023-04-18 PROCEDURE — 1160F RVW MEDS BY RX/DR IN RCRD: CPT | Mod: CPTII,S$GLB,, | Performed by: PSYCHOLOGIST

## 2023-04-18 PROCEDURE — 1159F MED LIST DOCD IN RCRD: CPT | Mod: CPTII,S$GLB,, | Performed by: PSYCHOLOGIST

## 2023-04-18 PROCEDURE — 90837 PSYTX W PT 60 MINUTES: CPT | Mod: S$GLB,,, | Performed by: PSYCHOLOGIST

## 2023-04-18 PROCEDURE — 1159F PR MEDICATION LIST DOCUMENTED IN MEDICAL RECORD: ICD-10-PCS | Mod: CPTII,S$GLB,, | Performed by: PSYCHOLOGIST

## 2023-04-18 PROCEDURE — 90837 PR PSYCHOTHERAPY W/PATIENT, 60 MIN: ICD-10-PCS | Mod: S$GLB,,, | Performed by: PSYCHOLOGIST

## 2023-04-18 NOTE — PROGRESS NOTES
[facility] Note    Chief Complaint      Chief Complaint   Patient presents with    Follow-up     Semi-annual       History of Present Illness      Yoana Thomas is a 28 y.o.  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  Referred by Prev PCP, Dr. Heike Pimentel. 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 Castro 2/7/22. Fu by ENT, Dr. Hall. CT neck - 3/4/22. S/p FNA - consistent with parotid low-grade neoplasm. S/p parotidectomy 3/21/22 - Mucoepidermoid carcinoma of salivary gland. PET CT 4/8/22.  S/P Left Neck Dissection 4/20/22. Fu by Rad/Onc. S/p adjuvant radiation which she completed 7/18/22.  Fu by Speech Tx.   CTA neck 9/17/22 - Postoperative change of left parotid mucoepidermoid carcinoma resection.  Superficial thickening and heterogeneous enhancement of the gland which may be related to postoperative or radiation changes.  5 mm soft tissue density in the superficial left parotid that may represent an intraparotid lymph node, however attention on follow-up exams is recommended. Abscess drained 9/20/22  CT Neck 10/13/22- Postoperative changes left parotid gland mass resection with expected findings of post treatment change.  No new nodular or masslike enhancement to suggest residual/recurrent disease.  No evidence of metastatic disease.  Sched for CT tomorrow (4/20/23).   Fu TSH Q 6 mos.     Borderline HLD - cholesterol was borderline high. She does not require medication for this at this time but I do recommend you follow a low cholesterol diet and exercise.  She gave up red meat Lent.     RT lung nodule - Fu by CT q 6 mos. Sched for repeat imaging tomorrow (4/20/23).      Anxiety - Pt on Paxil 40 mg/d for the past 2-3 yrs. She is doing well on this.      ADHD - Pt not currently on meds for this.      Obesity - BMI - 35.65 - down 3 lb since last visit. She has had a  stressful year and was unable to exercise for a while due to her Surgeries.  She was also stress eating. She was 89 at beg. Of pandemic. She  plans to start going back to the gym for cardio and will avoid weight lifting. We discussed pharmacotherapy. Given RadTx to the neck I would be hesitant to start Ozempic or mounjaro due to potential for inc risk of MTC. Will try lifestyle modification. Could consider adipex, contrave, Qsymia. She  cut out red meat again which helped with wt loss in past. She plans to cut back on gluten because she feels better off of it. She is starting to go back to the gym 4 times/wk.      Lab review: 1/26/21 HA1c - 5.5;  TSH - 2.76;   HDL 51 ; BUN/Cr - 12/0.77; H/H - 14.2/41.5     HCM - Flu - 10/19/22;  Tdap - admin 4/19/23; HPV vaccine - # 1 8/2/22 and # 2;   COVID - 19 Vaccine (Pfizer) #1 - 3/30/21; #2 - 4/23/21; #3 ; MGM - none;  DEXA - none;  PAP -8/2/22- neg ; Hep C Screen - 9/16/22 - neg.;  HIV Screen -  9/16/22 - neg.; C-scope - none;   Prev PCP - Dr. Heike Pimentel; Prev OB/GYN - in [place]; well visit - 10/19/22    Patient Care Team:  Abbey Leyva MD as PCP - General (Internal Medicine)     Health Maintenance:  Immunization History   Administered Date(s) Administered    COVID-19 Vaccine 04/01/2021    COVID-19, MRNA, LN-S, PF (Pfizer) (Purple Cap) 03/30/2021, 04/23/2021    HPV 9-Valent 08/02/2022, 10/19/2022    Influenza - Quadrivalent 10/01/2020    Influenza - Quadrivalent - PF *Preferred* (6 months and older) 10/19/2022    Tdap 04/19/2023      Health Maintenance   Topic Date Due    Pap Smear  08/02/2025    TETANUS VACCINE  04/19/2033    Hepatitis C Screening  Completed    Lipid Panel  Completed        Past Medical History:  Past Medical History:   Diagnosis Date    Anxiety     Attention deficit hyperactivity disorder (ADHD), predominantly inattentive type 06/14/2021    Hx of psychiatric care     Therapy        Past Surgical History:   has a past surgical  history that includes Keloid excision; [place] tooth extraction; Excision of parotid gland (Left, 3/21/2022); Creation of muscle rotational flap (Left, 3/21/2022); and Dissection of neck (Left, 4/20/2022).    Family History:  family history includes Diabetes type I in her maternal grandmother; Diabetes type II in her maternal grandfather; Heart disease in her maternal grandmother; Hypertension in her father; Infertility in her sister; Kidney failure in her maternal grandmother; Lymphoma in her paternal grandfather; Skin cancer in her father.     Social History:  Social History     Tobacco Use    Smoking status: Never     Passive exposure: Never    Smokeless tobacco: Never   Substance Use Topics    Alcohol use: Yes     Comment: once every couple mos.     Drug use: Never       Review of Systems   Constitutional:  Negative for chills, diaphoresis and fever.   HENT:  Positive for postnasal drip. Negative for hearing loss.    Eyes:  Negative for visual disturbance.        Wears glasses   Respiratory:  Positive for cough. Negative for shortness of breath.         Dry cough - improving since having suspected pneumonia.    Cardiovascular:  Negative for chest pain and palpitations.   Gastrointestinal:  Negative for abdominal pain, constipation, diarrhea, nausea and vomiting.   Endocrine: Positive for polydipsia. Negative for cold intolerance and heat intolerance.   Genitourinary:  Negative for dysuria, frequency and hematuria.   Musculoskeletal:  Negative for arthralgias and myalgias.   Neurological:  Negative for dizziness and headaches.   Psychiatric/Behavioral:  Positive for dysphoric mood. Negative for sleep disturbance. The patient is nervous/anxious.         Stable.       Medications:    Current Outpatient Medications:     paroxetine (PAXIL) 40 MG tablet, Take 1 tablet (40 mg total) by mouth once daily., Disp: 90 tablet, Rfl: 3    diphth,pertus,acell,,tetanus (BOOSTRIX TDAP) 2.5-8-5 Lf-mcg-Lf/0.5mL Syrg injection,  "Inject into the muscle., Disp: 0.5 mL, Rfl: 0    loratadine (CLARITIN) 10 mg tablet, Take 1 tablet (10 mg total) by mouth once daily., Disp: 30 tablet, Rfl: 2     Allergies:  Review of patient's allergies indicates:  No Known Allergies    Physical Exam      Vital Signs  Pulse: 69  Resp: 20  SpO2: 97 %  BP: 108/66  BP Location: Right arm  Pain Score: 0-No pain  Height and Weight  Height: 5' 2" (157.5 cm)  Weight: 88.4 kg (194 lb 14.2 oz)  BSA (Calculated - sq m): 1.97 sq meters  BMI (Calculated): 35.6  Weight in (lb) to have BMI = 25: 136.4             Physical Exam  Vitals reviewed.   Constitutional:       General: She is not in acute distress.     Appearance: Normal appearance. She is not ill-appearing, toxic-appearing or diaphoretic.   HENT:      Head: Normocephalic and atraumatic.      Ears:      Comments: Gabe serous otitis     Mouth/Throat:      Mouth: Mucous membranes are moist.      Pharynx: No posterior oropharyngeal erythema.   Eyes:      Extraocular Movements: Extraocular movements intact.      Conjunctiva/sclera: Conjunctivae normal.      Pupils: Pupils are equal, round, and reactive to light.   Neck:      Vascular: No carotid bruit.   Cardiovascular:      Rate and Rhythm: Normal rate and regular rhythm.      Pulses: Normal pulses.      Heart sounds: Normal heart sounds. No murmur heard.  Pulmonary:      Effort: No respiratory distress.      Breath sounds: Normal breath sounds. No wheezing.   Abdominal:      General: Bowel sounds are normal. There is no distension.      Palpations: Abdomen is soft.      Tenderness: There is no abdominal tenderness. There is no guarding or rebound.   Musculoskeletal:         General: Normal range of motion.   Skin:     General: Skin is warm.   Neurological:      General: No focal deficit present.      Mental Status: She is alert and oriented to person, place, and time.   Psychiatric:         Mood and Affect: Mood normal.         Behavior: Behavior normal.    "     Laboratory:  CBC:  Recent Labs   Lab 02/07/22  1620 09/17/22  0210 09/17/22  0220   WBC 8.87 7.70  --    RBC 4.90 4.56  --    Hemoglobin 14.2 14.0  --    POC Hematocrit  --   --  41   Hematocrit 43.6 40.4  --    Platelets 306 274  --    MCV 89 89  --    MCH 29.0 30.7  --    MCHC 32.6 34.7  --        CMP:  Recent Labs   Lab 09/17/22  0210   Glucose 104   Calcium 9.7   Albumin 3.8   Total Protein 7.2   Sodium 137   Potassium 3.6   CO2 24   Chloride 104   BUN 16   Creatinine 0.8   Alkaline Phosphatase 81   ALT 15   AST 13   Total Bilirubin 0.2          LIPIDS:  Recent Labs   Lab 08/29/22  1345 10/19/22  0842   TSH 1.112  --    HDL  --  59   Cholesterol  --  218 H   Triglycerides  --  100   LDL Cholesterol  --  139.0   HDL/Cholesterol Ratio  --  27.1   Non-HDL Cholesterol  --  159   Total Cholesterol/HDL Ratio  --  3.7       TSH:  Recent Labs   Lab 08/29/22  1345   TSH 1.112       Other:   Recent Labs   Lab 08/29/22  1345   Follicle Stimulating Hormone 5.08   Estradiol 54     Recent Labs   Lab 09/17/22  0210   Hepatitis C Ab Non-reactive       Assessment/Plan     Yoana Thomas is a 28 y.o.female with:    Anxiety  - Stable.  Cont current regimen.    Mucoepidermoid carcinoma of salivary gland  -     TSH; Future; Expected date: 04/19/2023  - Stable.  Sched for imaging tomorrow. Check TSH Q6 mos per Rad Onc.     Pulmonary nodule  - Stable.   Sched for imaging tomorrow.     Class 2 obesity with body mass index (BMI) of 35.0 to 35.9 in adult, unspecified obesity type, unspecified whether serious comorbidity present  - Congratulated on wt loss. Rec diet and exercise as discussed for wt loss.      Bilateral serous otitis media, unspecified chronicity  - Rec autoinsufflation exercises prn.  Flonase 2 SEN/d prn, Zyrtec prn.      Other orders  -     CBC Auto Differential; Future; Expected date: 10/01/2023  -     Comprehensive Metabolic Panel; Future; Expected date: 10/01/2023  -     TSH; Future; Expected date:  10/01/2023  -     Lipid Panel; Future; Expected date: 10/01/2023         Chronic conditions status updated as per HPI.  Other than changes above, cont current medications and maintain follow up with specialists.  Follow up in about 6 months (around 10/20/2023) for well visit or sooner if needed.      Abbey Leyva MD  [facility]

## 2023-04-19 ENCOUNTER — LAB VISIT (OUTPATIENT)
Dept: LAB | Facility: HOSPITAL | Age: 28
End: 2023-04-19
Attending: INTERNAL MEDICINE
Payer: COMMERCIAL

## 2023-04-19 ENCOUNTER — OFFICE VISIT (OUTPATIENT)
Dept: PRIMARY CARE CLINIC | Facility: CLINIC | Age: 28
End: 2023-04-19
Payer: COMMERCIAL

## 2023-04-19 VITALS
RESPIRATION RATE: 20 BRPM | DIASTOLIC BLOOD PRESSURE: 66 MMHG | OXYGEN SATURATION: 97 % | HEART RATE: 69 BPM | SYSTOLIC BLOOD PRESSURE: 108 MMHG | HEIGHT: 62 IN | WEIGHT: 194.88 LBS | BODY MASS INDEX: 35.86 KG/M2

## 2023-04-19 DIAGNOSIS — E66.9 CLASS 2 OBESITY WITH BODY MASS INDEX (BMI) OF 35.0 TO 35.9 IN ADULT, UNSPECIFIED OBESITY TYPE, UNSPECIFIED WHETHER SERIOUS COMORBIDITY PRESENT: ICD-10-CM

## 2023-04-19 DIAGNOSIS — F41.9 ANXIETY: Primary | ICD-10-CM

## 2023-04-19 DIAGNOSIS — H65.93 BILATERAL SEROUS OTITIS MEDIA, UNSPECIFIED CHRONICITY: ICD-10-CM

## 2023-04-19 DIAGNOSIS — Z13.220 SCREENING FOR LIPOID DISORDERS: ICD-10-CM

## 2023-04-19 DIAGNOSIS — C08.9 MUCOEPIDERMOID CARCINOMA OF SALIVARY GLAND: ICD-10-CM

## 2023-04-19 DIAGNOSIS — R91.1 PULMONARY NODULE: ICD-10-CM

## 2023-04-19 DIAGNOSIS — Z00.00 NORMAL PHYSICAL EXAM, ROUTINE: ICD-10-CM

## 2023-04-19 LAB — TSH SERPL DL<=0.005 MIU/L-ACNC: 2.23 UIU/ML (ref 0.4–4)

## 2023-04-19 PROCEDURE — 1160F PR REVIEW ALL MEDS BY PRESCRIBER/CLIN PHARMACIST DOCUMENTED: ICD-10-PCS | Mod: CPTII,S$GLB,, | Performed by: INTERNAL MEDICINE

## 2023-04-19 PROCEDURE — 3074F PR MOST RECENT SYSTOLIC BLOOD PRESSURE < 130 MM HG: ICD-10-PCS | Mod: CPTII,S$GLB,, | Performed by: INTERNAL MEDICINE

## 2023-04-19 PROCEDURE — 3078F PR MOST RECENT DIASTOLIC BLOOD PRESSURE < 80 MM HG: ICD-10-PCS | Mod: CPTII,S$GLB,, | Performed by: INTERNAL MEDICINE

## 2023-04-19 PROCEDURE — 84443 ASSAY THYROID STIM HORMONE: CPT | Performed by: INTERNAL MEDICINE

## 2023-04-19 PROCEDURE — 36415 COLL VENOUS BLD VENIPUNCTURE: CPT | Mod: PN | Performed by: INTERNAL MEDICINE

## 2023-04-19 PROCEDURE — 1159F MED LIST DOCD IN RCRD: CPT | Mod: CPTII,S$GLB,, | Performed by: INTERNAL MEDICINE

## 2023-04-19 PROCEDURE — 3008F BODY MASS INDEX DOCD: CPT | Mod: CPTII,S$GLB,, | Performed by: INTERNAL MEDICINE

## 2023-04-19 PROCEDURE — 3008F PR BODY MASS INDEX (BMI) DOCUMENTED: ICD-10-PCS | Mod: CPTII,S$GLB,, | Performed by: INTERNAL MEDICINE

## 2023-04-19 PROCEDURE — 1160F RVW MEDS BY RX/DR IN RCRD: CPT | Mod: CPTII,S$GLB,, | Performed by: INTERNAL MEDICINE

## 2023-04-19 PROCEDURE — 99214 OFFICE O/P EST MOD 30 MIN: CPT | Mod: S$GLB,,, | Performed by: INTERNAL MEDICINE

## 2023-04-19 PROCEDURE — 99999 PR PBB SHADOW E&M-EST. PATIENT-LVL IV: CPT | Mod: PBBFAC,,, | Performed by: INTERNAL MEDICINE

## 2023-04-19 PROCEDURE — 1159F PR MEDICATION LIST DOCUMENTED IN MEDICAL RECORD: ICD-10-PCS | Mod: CPTII,S$GLB,, | Performed by: INTERNAL MEDICINE

## 2023-04-19 PROCEDURE — 3078F DIAST BP <80 MM HG: CPT | Mod: CPTII,S$GLB,, | Performed by: INTERNAL MEDICINE

## 2023-04-19 PROCEDURE — 99214 PR OFFICE/OUTPT VISIT, EST, LEVL IV, 30-39 MIN: ICD-10-PCS | Mod: S$GLB,,, | Performed by: INTERNAL MEDICINE

## 2023-04-19 PROCEDURE — 3074F SYST BP LT 130 MM HG: CPT | Mod: CPTII,S$GLB,, | Performed by: INTERNAL MEDICINE

## 2023-04-19 PROCEDURE — 99999 PR PBB SHADOW E&M-EST. PATIENT-LVL IV: ICD-10-PCS | Mod: PBBFAC,,, | Performed by: INTERNAL MEDICINE

## 2023-04-19 NOTE — PROGRESS NOTES
PSYCHO-ONCOLOGY NOTE/ Individual Psychotherapy     Date: 4/18/2023   Site:  [facility]        Therapeutic Intervention: Met with patient.  Outpatient - Behavior modifying psychotherapy 60 min - CPT code 33210  The patient's last visit with me was on 3/24/2023.    Problem list  Patient Active Problem List   Diagnosis    Generalized anxiety disorder    Attention deficit hyperactivity disorder (ADHD), predominantly inattentive type    Mucoepidermoid carcinoma of salivary gland       Chief complaint/reason for encounter: adjustment, anxiety   Met with patient to evaluate psychosocial adaptation to diagnosis/treatment/survivorship of parotid cancer    Current Medications  Current Outpatient Medications   Medication    loratadine (CLARITIN) 10 mg tablet    paroxetine (PAXIL) 40 MG tablet     No current facility-administered medications for this visit.       Objective:  Yoana Thomas arrived promptly for the session.  Ms. Thomas was independently ambulatory at the time of session. The patient was fully cooperative throughout the session.  Appearance: age appropriate, casually  dressed, well groomed  Behavior/Cooperation: friendly and cooperative  Speech: normal in rate, volume, and tone and appropriate quality, quantity and organization of sentences  Mood:euthymic  Affect: euthymic  Thought Process: goal-directed, logical  Thought Content: normal,  No delusions or paranoia; did not appear to be responding to internal stimuli during the session  Orientation: grossly intact  Memory: Grossly intact  Attention Span/Concentration: Attends to session without distraction; reports no difficulty  Fund of Knowledge: average  Estimate of Intelligence: above average from verbal skills and history  Cognition: grossly intact  Insight: patient has awareness of illness; good insight into own behavior and behavior of others  Judgment: the patient's behavior is adequate to circumstances    Interval history and content of current  "session: Patient discussed events and activities since the time of last visit. Discussed current adaptation to disease and treatment status. Reports to be coping with improvement. Discussed scar and glasses as reminders of illness. She does report having some anxiety due to upcoming scans. Discussed coping with scan anxiety. Discussed logistical and financial fears.    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    Discussed her changes in thinking about her work. 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    Eating healthily, working out, drinking water to improve "control over my body."    Discussed PRAISES     Risk parameters:   Patient reports no suicidal ideation  Patient reports no homicidal ideation  Patient reports no self-injurious behavior  Patient reports no violent behavior   Safety needs:  None at this time      Verbal deficits: None     Patient's response to intervention:The patient's response to intervention is accepting, motivated.     Progress toward goals: Progress as Expected        Patient reported outcomes:      Distress thermometer:   DISTRESS SCREENING 4/18/2023 3/24/2023 3/24/2023 2/22/2023 2/13/2023 1/23/2023 1/19/2023   Distress Score 0 - No Distress 3 3 0 - No Distress 0 - No Distress 0 - No Distress 0 - No Distress   Practical Problems None of these None of these None of these None of these None of these None of these None of these   Family Problems None of these None of these None of these None of these None of these None of these None of these   Emotional Problems None of these None of these None of these None of these None of these None of these None of these   Spiritual / Yazdanism Concerns No No No No No No No   Physical Problems Appearance;Feeling Swollen Appearance;Feeling Swollen Appearance;Feeling Swollen Fatigue None of these Appearance;Feeling Swollen None of these    "        PHQ-9= Initial visit: 4  PHQ ANSWERS    Q1. Little interest or pleasure in doing things: Not at all (04/18/23 1510)  Q2. Feeling down, depressed, or hopeless: Not at all (04/18/23 1510)  Q3. Trouble falling or staying asleep, or sleeping too much: Several days (04/18/23 1510)  Q4. Feeling tired or having little energy: Several days (04/18/23 1510)  Q5. Poor appetite or overeating: Not at all (04/18/23 1510)  Q6. Feeling bad about yourself - or that you are a failure or have let yourself or your family down: Not at all (04/18/23 1510)  Q7. Trouble concentrating on things, such as reading the newspaper or watching television: Not at all (04/18/23 1510)  Q8. Moving or speaking so slowly that other people could have noticed. Or the opposite - being so fidgety or restless that you have been moving around a lot more than usual: Not at all (04/18/23 1510)  Q9. Thoughts that you would be better off dead, or of hurting yourself in some way: Not at all (04/18/23 1510)    PHQ8 Score : 2 (04/18/23 1510)  PHQ-9 Total Score: 2 (04/18/23 1510)      FABIAN-7= Initial visit:6   GAD7 4/18/2023 3/24/2023 2/22/2023   1. Feeling nervous, anxious, or on edge? 1 1 1   2. Not being able to stop or control worrying? 0 1 0   3. Worrying too much about different things? 1 1 1   4. Trouble relaxing? 1 0 0   5. Being so restless that it is hard to sit still? 0 0 0   6. Becoming easily annoyed or irritable? 1 1 1   7. Feeling afraid as if something awful might happen? 0 0 0   FABIAN-7 Score 4 4 3          Client Strengths: verbal, intelligent, successful, good social support, good insight, commitment to wellness,       Treatment Plan:individual psychotherapy  Target symptoms: anxiety   Why chosen therapy is appropriate versus another modality: relevant to diagnosis, patient responds to this modality, evidence based practice  Outcome monitoring methods: self-report, checklist/rating scale  Therapeutic intervention type: behavior modifying  psychotherapy  Prognosis: Excellent    Goals from last visit: Met   Behavioral goals prior to next visit:    Exercise: continue time at gym   Stress management:    Social engagement:  increased time with friends     Plan vacation with aunt and cousin for this summer     [employer]   Nutrition:  start bringing lunch to work   Smoking Cessation:   Therapy: pre-coping for scans     Back to contacts, soon?    Return to clinic: as needed     Length of Service (minutes direct face-to-face contact): 60    Diagnosis:     ICD-10-CM ICD-9-CM   1. Generalized anxiety disorder  F41.1 300.02         Marcel Barroso, PhD  [place] License #[id number]  [place] License #[id number]

## 2023-04-20 ENCOUNTER — HOSPITAL ENCOUNTER (OUTPATIENT)
Dept: RADIOLOGY | Facility: HOSPITAL | Age: 28
Discharge: HOME | End: 2023-04-20
Attending: STUDENT IN AN ORGANIZED HEALTH CARE EDUCATION/TRAINING PROGRAM
Payer: COMMERCIAL

## 2023-04-20 ENCOUNTER — OFFICE VISIT (OUTPATIENT)
Dept: RADIATION ONCOLOGY | Facility: CLINIC | Age: 28
End: 2023-04-20
Payer: COMMERCIAL

## 2023-04-20 VITALS
HEIGHT: 62 IN | WEIGHT: 194.88 LBS | BODY MASS INDEX: 35.86 KG/M2 | DIASTOLIC BLOOD PRESSURE: 72 MMHG | OXYGEN SATURATION: 96 % | SYSTOLIC BLOOD PRESSURE: 109 MMHG | RESPIRATION RATE: 17 BRPM | HEART RATE: 78 BPM | TEMPERATURE: 98 F

## 2023-04-20 DIAGNOSIS — R91.1 SOLITARY PULMONARY NODULE: ICD-10-CM

## 2023-04-20 DIAGNOSIS — C08.9 MUCOEPIDERMOID CARCINOMA OF SALIVARY GLAND: Primary | ICD-10-CM

## 2023-04-20 DIAGNOSIS — Z92.3 HISTORY OF HEAD AND NECK RADIATION: ICD-10-CM

## 2023-04-20 DIAGNOSIS — C08.9 MUCOEPIDERMOID CARCINOMA OF SALIVARY GLAND: ICD-10-CM

## 2023-04-20 PROCEDURE — 3078F DIAST BP <80 MM HG: CPT | Mod: CPTII,S$GLB,, | Performed by: STUDENT IN AN ORGANIZED HEALTH CARE EDUCATION/TRAINING PROGRAM

## 2023-04-20 PROCEDURE — 71260 CT THORAX DX C+: CPT | Mod: 26,,, | Performed by: RADIOLOGY

## 2023-04-20 PROCEDURE — 70491 CT SOFT TISSUE NECK WITH CONTRAST: ICD-10-PCS | Mod: 26,,, | Performed by: RADIOLOGY

## 2023-04-20 PROCEDURE — 3078F PR MOST RECENT DIASTOLIC BLOOD PRESSURE < 80 MM HG: ICD-10-PCS | Mod: CPTII,S$GLB,, | Performed by: STUDENT IN AN ORGANIZED HEALTH CARE EDUCATION/TRAINING PROGRAM

## 2023-04-20 PROCEDURE — 99999 PR PBB SHADOW E&M-EST. PATIENT-LVL III: CPT | Mod: PBBFAC,,, | Performed by: STUDENT IN AN ORGANIZED HEALTH CARE EDUCATION/TRAINING PROGRAM

## 2023-04-20 PROCEDURE — 3008F BODY MASS INDEX DOCD: CPT | Mod: CPTII,S$GLB,, | Performed by: STUDENT IN AN ORGANIZED HEALTH CARE EDUCATION/TRAINING PROGRAM

## 2023-04-20 PROCEDURE — 25500020 PHARM REV CODE 255: Performed by: STUDENT IN AN ORGANIZED HEALTH CARE EDUCATION/TRAINING PROGRAM

## 2023-04-20 PROCEDURE — 3074F SYST BP LT 130 MM HG: CPT | Mod: CPTII,S$GLB,, | Performed by: STUDENT IN AN ORGANIZED HEALTH CARE EDUCATION/TRAINING PROGRAM

## 2023-04-20 PROCEDURE — 99213 PR OFFICE/OUTPT VISIT, EST, LEVL III, 20-29 MIN: ICD-10-PCS | Mod: S$GLB,,, | Performed by: STUDENT IN AN ORGANIZED HEALTH CARE EDUCATION/TRAINING PROGRAM

## 2023-04-20 PROCEDURE — 1159F MED LIST DOCD IN RCRD: CPT | Mod: CPTII,S$GLB,, | Performed by: STUDENT IN AN ORGANIZED HEALTH CARE EDUCATION/TRAINING PROGRAM

## 2023-04-20 PROCEDURE — 99999 PR PBB SHADOW E&M-EST. PATIENT-LVL III: ICD-10-PCS | Mod: PBBFAC,,, | Performed by: STUDENT IN AN ORGANIZED HEALTH CARE EDUCATION/TRAINING PROGRAM

## 2023-04-20 PROCEDURE — 70491 CT SOFT TISSUE NECK W/DYE: CPT | Mod: 26,,, | Performed by: RADIOLOGY

## 2023-04-20 PROCEDURE — 1159F PR MEDICATION LIST DOCUMENTED IN MEDICAL RECORD: ICD-10-PCS | Mod: CPTII,S$GLB,, | Performed by: STUDENT IN AN ORGANIZED HEALTH CARE EDUCATION/TRAINING PROGRAM

## 2023-04-20 PROCEDURE — 3074F PR MOST RECENT SYSTOLIC BLOOD PRESSURE < 130 MM HG: ICD-10-PCS | Mod: CPTII,S$GLB,, | Performed by: STUDENT IN AN ORGANIZED HEALTH CARE EDUCATION/TRAINING PROGRAM

## 2023-04-20 PROCEDURE — 3008F PR BODY MASS INDEX (BMI) DOCUMENTED: ICD-10-PCS | Mod: CPTII,S$GLB,, | Performed by: STUDENT IN AN ORGANIZED HEALTH CARE EDUCATION/TRAINING PROGRAM

## 2023-04-20 PROCEDURE — 70491 CT SOFT TISSUE NECK W/DYE: CPT | Mod: TC

## 2023-04-20 PROCEDURE — 71260 CT CHEST WITH CONTRAST: ICD-10-PCS | Mod: 26,,, | Performed by: RADIOLOGY

## 2023-04-20 PROCEDURE — 99213 OFFICE O/P EST LOW 20 MIN: CPT | Mod: S$GLB,,, | Performed by: STUDENT IN AN ORGANIZED HEALTH CARE EDUCATION/TRAINING PROGRAM

## 2023-04-20 PROCEDURE — 71260 CT THORAX DX C+: CPT | Mod: TC

## 2023-04-20 RX ADMIN — IOHEXOL 75 ML: 350 INJECTION, SOLUTION INTRAVENOUS at 10:04

## 2023-04-20 NOTE — PROGRESS NOTES
[facility]  Follow Up Visit Note    Diagnosis:  Yoana Thomas is a 28 y.o. female with T2N1M0, group stage III, mucoepidermoid carcinoma of the left parotid s/p left parotid excision (3/21/22) and left neck dissection (4/20/22). She was treated with adjuvant radiation to 60 Gy in 30 fractions to the parotid operative bed and 54 Gy in 30 fractions to the left neck, completed 7/18/22.     Oncologic History:  She has a history of a keloid of the left earlobe treated with RT in approximately 2016 in [facility]). She presented with a left neck mass.  2/16/22: CT neck with a 2.1cm mixed sold and cystic mass in the left superficial parotid with an additional enlarged 9 mm parotid node and prominent left neck adenopathy  2/16/22: FNA in clinic with atypical cells  3/21/22: left parotid gland excision with muscle rotation flap.  Path: 2.2 cm intermediate grade mucoepidermoid carcinoma with no extraparenchymal extension. - SM at 1mm, -PNI, +LVSI. 1/4 LN + for metastatic carcinoma (largest deposit 2mm), no CHARLES  4/8/22: PET/CT with no FDG avid tumor. Mild uptake in the left parotid gland possibly from prior surgery. No FDG avid lymphadenopathy.  4/20/22: left neck level II-IV dissection, with 0-29 LN+ for carcinoma  6/6/22-7/18/22: 60 Gy in 30 fractions to the parotid operativebed and 54 Gy in 30 fractions to the left neck (level IB-IV).    Interval History  The patient presents today for follow up.    Since last visit, she met with Dr. Hall with HORACIO on exam. Saw PT for trismus, with not significant benefit and was cost prohibitive.     No H&N complaints. Tolerating a full diet. Has some mild xerostomia noticeable with exercising. No new neck masses. Stable tightness near the angle of the mandible on the left. No otalgia, throat pain, numbness or weakness, sensation changes.    Review of Systems   ROS as above    Social History:  Social History     Tobacco Use    Smoking  "status: Never     Passive exposure: Never    Smokeless tobacco: Never   Substance Use Topics    Alcohol use: Yes     Comment: once every couple mos.     Drug use: Never       Family History:  Cancer-related family history includes Lymphoma in her paternal grandfather; Skin cancer in her father.    Exam:  Vitals:    04/20/23 1024   BP: 109/72   BP Location: Left arm   Patient Position: Sitting   Pulse: 78   Resp: 17   Temp: 97.6 °F (36.4 °C)   SpO2: 96%   Weight: 88.4 kg (194 lb 14.2 oz)   Height: 5' 2" (1.575 m)           Constitutional: Pleasant 28 y.o. female in no acute distress.  Well nourished. Well groomed.   HEENT: no facial asymmetry, no appreciated OC or opx lesions on direct exam. Moist mucus membranes. No appreciated OC or OPX scans.   Lymph: no appreciated pre-auricular or cervical adenopathy  Lungs: No audible wheezing.  Normal effort.   Musculoskeletal: No gross MSK deformities.  Skin: No rashes appreciated.   Psych: Alert and oriented with appropriate mood and affect.  Neuro: Grossly normal.      Data Review:    Information obtained via chart review and discussion with Ms. Thomas    CT neck and chest from today was personally reviewed.     Assessment:  T2N1M0, group stage III, mucoepidermoid carcinoma of the left parotid s/p left parotid excision (3/21/22) and left neck dissection (4/20/22). She was treated with adjuvant radiation to 60 Gy in 30 fractions to the parotid operative bed and 54 Gy in 30 fractions to the left neck, completed 7/18/22.  She has a history of prior left ear radiation for a keloid (unable to obtain prior records)  Doing well, with HORACIO on exam  TSH WNL 4/19/23  ECOG: (0) Fully active, able to carry on all predisease performance without restriction    Plan:  She will see Dr. Hall in May  Will follow up CT neck and chest  Will plan to see her back in 6 months or earlier FELECIA Blanco MD  Radiation Oncology              "

## 2023-04-21 ENCOUNTER — PATIENT MESSAGE (OUTPATIENT)
Dept: PSYCHIATRY | Facility: CLINIC | Age: 28
End: 2023-04-21
Payer: COMMERCIAL

## 2023-05-10 ENCOUNTER — OFFICE VISIT (OUTPATIENT)
Dept: OTOLARYNGOLOGY | Facility: CLINIC | Age: 28
End: 2023-05-10
Payer: COMMERCIAL

## 2023-05-10 VITALS — DIASTOLIC BLOOD PRESSURE: 74 MMHG | SYSTOLIC BLOOD PRESSURE: 98 MMHG | HEART RATE: 89 BPM

## 2023-05-10 DIAGNOSIS — C08.9 MUCOEPIDERMOID CARCINOMA OF SALIVARY GLAND: Primary | ICD-10-CM

## 2023-05-10 PROCEDURE — 1159F MED LIST DOCD IN RCRD: CPT | Mod: CPTII,S$GLB,, | Performed by: OTOLARYNGOLOGY

## 2023-05-10 PROCEDURE — 99999 PR PBB SHADOW E&M-EST. PATIENT-LVL II: ICD-10-PCS | Mod: PBBFAC,,, | Performed by: OTOLARYNGOLOGY

## 2023-05-10 PROCEDURE — 3074F PR MOST RECENT SYSTOLIC BLOOD PRESSURE < 130 MM HG: ICD-10-PCS | Mod: CPTII,S$GLB,, | Performed by: OTOLARYNGOLOGY

## 2023-05-10 PROCEDURE — 3074F SYST BP LT 130 MM HG: CPT | Mod: CPTII,S$GLB,, | Performed by: OTOLARYNGOLOGY

## 2023-05-10 PROCEDURE — 99214 PR OFFICE/OUTPT VISIT, EST, LEVL IV, 30-39 MIN: ICD-10-PCS | Mod: S$GLB,,, | Performed by: OTOLARYNGOLOGY

## 2023-05-10 PROCEDURE — 99999 PR PBB SHADOW E&M-EST. PATIENT-LVL II: CPT | Mod: PBBFAC,,, | Performed by: OTOLARYNGOLOGY

## 2023-05-10 PROCEDURE — 1159F PR MEDICATION LIST DOCUMENTED IN MEDICAL RECORD: ICD-10-PCS | Mod: CPTII,S$GLB,, | Performed by: OTOLARYNGOLOGY

## 2023-05-10 PROCEDURE — 3078F PR MOST RECENT DIASTOLIC BLOOD PRESSURE < 80 MM HG: ICD-10-PCS | Mod: CPTII,S$GLB,, | Performed by: OTOLARYNGOLOGY

## 2023-05-10 PROCEDURE — 99214 OFFICE O/P EST MOD 30 MIN: CPT | Mod: S$GLB,,, | Performed by: OTOLARYNGOLOGY

## 2023-05-10 PROCEDURE — 3078F DIAST BP <80 MM HG: CPT | Mod: CPTII,S$GLB,, | Performed by: OTOLARYNGOLOGY

## 2023-05-10 NOTE — PROGRESS NOTES
Subjective:       Patient ID: Yoana Thomas is a 28 y.o. female.    Oncology History   Mucoepidermoid carcinoma of salivary gland   3/21/2022 Surgery    EXCISION, PAROTID GLAND-nerve monitoring (Left)  CREATION, FLAP, MUSCLE ROTATION-SCM (Left)     4/4/2022 Initial Diagnosis    Mucoepidermoid carcinoma of salivary gland       4/4/2022 Cancer Staged    Staging form: Major Salivary Glands, AJCC 8th Edition  - Pathologic stage from 4/4/2022: Stage III (pT2, pN1, cM0)        Radiation Therapy    Treating physician: Alli Blanco  Total Dose: 60 Gy  Fractions: 30    Treatment Summary  Course: C1 H&N 2022  Treatment Site Ref. ID Energy Dose/Fx (Gy) #Fx Dose Correction (Gy) Total Dose (Gy) Start Date End Date Elapsed [facility]   [facility] / 30 0 60 6/6/2022 7/18/2022 42          Chief Complaint: post op  HPI     Yoana Thomas returns for surveillance. Completed XRT 7/18/22. Doing well. No issues with scar. Using silicone tape and sunscreen as needed. Recently got a head and neck ribbon tattoo over the tail of the parotid. No issues with healing.        Past Medical History:   Diagnosis Date    Anxiety     Attention deficit hyperactivity disorder (ADHD), predominantly inattentive type 06/14/2021    Hx of psychiatric care     Therapy        Past Surgical History:   Procedure Laterality Date    CREATION OF MUSCLE ROTATIONAL FLAP Left 3/21/2022    Procedure: CREATION, FLAP, MUSCLE ROTATION-SCM;  Surgeon: Edel Hall MD;  Location: [facility] OR [address] [place] [address];  Service: ENT;  Laterality: Left;    DISSECTION OF NECK Left 4/20/2022    Procedure: DISSECTION, NECK;  Surgeon: Edel Hall MD;  Location: [facility] OR [address] [place] [address];  Service: ENT;  Laterality: Left;    EXCISION OF PAROTID GLAND Left 3/21/2022    Procedure: EXCISION, PAROTID GLAND-nerve monitoring;  Surgeon: Edel Hall MD;  Location: [facility] OR [address] [place] [address];  Service: ENT;  Laterality: Left;    KELOID EXCISION      Left ear and she also had  radiation Tx    WISDOM TOOTH EXTRACTION           Current Outpatient Medications:     diphth,pertus,acell,,tetanus (BOOSTRIX TDAP) 2.5-8-5 Lf-mcg-Lf/0.5mL Syrg injection, Inject into the muscle., Disp: 0.5 mL, Rfl: 0    loratadine (CLARITIN) 10 mg tablet, Take 1 tablet (10 mg total) by mouth once daily., Disp: 30 tablet, Rfl: 2    paroxetine (PAXIL) 40 MG tablet, Take 1 tablet (40 mg total) by mouth once daily., Disp: 90 tablet, Rfl: 3    Review of patient's allergies indicates:  No Known Allergies    Social History     Socioeconomic History    Marital status: Single   Tobacco Use    Smoking status: Never     Passive exposure: Never    Smokeless tobacco: Never   Substance and Sexual Activity    Alcohol use: Yes     Comment: once every couple mos.     Drug use: Never    Sexual activity: Yes     Partners: Male     Birth control/protection: Condom   Social History Narrative    Works for [employer])    Originally from [place]       Family History   Problem Relation Age of Onset    Skin cancer Father     Hypertension Father     Infertility Sister     Diabetes type II Maternal Grandfather     Diabetes type I Maternal Grandmother     Heart disease Maternal Grandmother     Kidney failure Maternal Grandmother         ESRD    Lymphoma Paternal Grandfather        Review of Systems   Constitutional: Negative.    HENT: Negative.    Eyes: Negative.    Respiratory: Negative.    Cardiovascular: Negative.    Gastrointestinal: Negative.    Endocrine: Negative.    Genitourinary: Negative.    Musculoskeletal: Negative.    Integumentary:  Negative.   Allergic/Immunologic: Negative.    Neurological: Negative.    Hematological: Negative.    Psychiatric/Behavioral: Negative.          Objective:      Physical Exam  Constitutional:       Appearance: Normal appearance.   HENT:      Head: Normocephalic and atraumatic.      Right Ear: External ear normal.      Left Ear: External ear normal.   Neck:      Comments: Modified  Jose and neck dissection incision CDI.  No redness, drainage, or fluid collection noted. No parotid or neck mass to palpation. Previous I&D site well healed.  Pulmonary:      Effort: Pulmonary effort is normal. No respiratory distress.   Neurological:      General: No focal deficit present.      Mental Status: She is alert.   Psychiatric:         Mood and Affect: Mood normal.         Behavior: Behavior normal.         Thought Content: Thought content normal.     CT Neck 4/20/23:  Postoperative changes of left parotidectomy and neck dissection for mucoid epidermoid carcinoma.  No new enhancement or nodular growth to suggest recurrent disease.  No evidence of metastatic disease.        Assessment:       Problem List Items Addressed This Visit          Oncology    Mucoepidermoid carcinoma of salivary gland - Primary                   Plan:       Problem List Items Addressed This Visit          Oncology    Mucoepidermoid carcinoma of salivary gland - Primary                 T2N1M0 mucoepidermoid carcinoma, intermediate grade, of the left parotid. Completed postoperative XRT 7/18/22. Doing well, HORACIO. Surveillance Neck CT HORACIO. Return to clinic in 6 weeks for surveillance or sooner as needed.

## 2023-06-26 ENCOUNTER — OFFICE VISIT (OUTPATIENT)
Dept: OTOLARYNGOLOGY | Facility: CLINIC | Age: 28
End: 2023-06-26
Payer: COMMERCIAL

## 2023-06-26 VITALS
BODY MASS INDEX: 35.65 KG/M2 | WEIGHT: 194.88 LBS | DIASTOLIC BLOOD PRESSURE: 71 MMHG | SYSTOLIC BLOOD PRESSURE: 106 MMHG | HEART RATE: 79 BPM

## 2023-06-26 DIAGNOSIS — C08.9 MUCOEPIDERMOID CARCINOMA OF SALIVARY GLAND: Primary | ICD-10-CM

## 2023-06-26 PROCEDURE — 99214 PR OFFICE/OUTPT VISIT, EST, LEVL IV, 30-39 MIN: ICD-10-PCS | Mod: S$GLB,,, | Performed by: OTOLARYNGOLOGY

## 2023-06-26 PROCEDURE — 3078F PR MOST RECENT DIASTOLIC BLOOD PRESSURE < 80 MM HG: ICD-10-PCS | Mod: CPTII,S$GLB,, | Performed by: OTOLARYNGOLOGY

## 2023-06-26 PROCEDURE — 1159F MED LIST DOCD IN RCRD: CPT | Mod: CPTII,S$GLB,, | Performed by: OTOLARYNGOLOGY

## 2023-06-26 PROCEDURE — 99999 PR PBB SHADOW E&M-EST. PATIENT-LVL III: ICD-10-PCS | Mod: PBBFAC,,, | Performed by: OTOLARYNGOLOGY

## 2023-06-26 PROCEDURE — 3074F PR MOST RECENT SYSTOLIC BLOOD PRESSURE < 130 MM HG: ICD-10-PCS | Mod: CPTII,S$GLB,, | Performed by: OTOLARYNGOLOGY

## 2023-06-26 PROCEDURE — 99999 PR PBB SHADOW E&M-EST. PATIENT-LVL III: CPT | Mod: PBBFAC,,, | Performed by: OTOLARYNGOLOGY

## 2023-06-26 PROCEDURE — 3074F SYST BP LT 130 MM HG: CPT | Mod: CPTII,S$GLB,, | Performed by: OTOLARYNGOLOGY

## 2023-06-26 PROCEDURE — 3008F BODY MASS INDEX DOCD: CPT | Mod: CPTII,S$GLB,, | Performed by: OTOLARYNGOLOGY

## 2023-06-26 PROCEDURE — 1159F PR MEDICATION LIST DOCUMENTED IN MEDICAL RECORD: ICD-10-PCS | Mod: CPTII,S$GLB,, | Performed by: OTOLARYNGOLOGY

## 2023-06-26 PROCEDURE — 99214 OFFICE O/P EST MOD 30 MIN: CPT | Mod: S$GLB,,, | Performed by: OTOLARYNGOLOGY

## 2023-06-26 PROCEDURE — 3008F PR BODY MASS INDEX (BMI) DOCUMENTED: ICD-10-PCS | Mod: CPTII,S$GLB,, | Performed by: OTOLARYNGOLOGY

## 2023-06-26 PROCEDURE — 3078F DIAST BP <80 MM HG: CPT | Mod: CPTII,S$GLB,, | Performed by: OTOLARYNGOLOGY

## 2023-06-26 NOTE — PROGRESS NOTES
Subjective:       Patient ID: Yoana Thomas is a 28 y.o. female.    Oncology History   Mucoepidermoid carcinoma of salivary gland   3/21/2022 Surgery    EXCISION, PAROTID GLAND-nerve monitoring (Left)  CREATION, FLAP, MUSCLE ROTATION-SCM (Left)     4/4/2022 Initial Diagnosis    Mucoepidermoid carcinoma of salivary gland     4/4/2022 Cancer Staged    Staging form: Major Salivary Glands, AJCC 8th Edition  - Pathologic stage from 4/4/2022: Stage III (pT2, pN1, cM0)      Radiation Therapy    Treating physician: Alli Blanco  Total Dose: 60 Gy  Fractions: 30    Treatment Summary  Course: C1 H&N 2022  Treatment Site Ref. ID Energy Dose/Fx (Gy) #Fx Dose Correction (Gy) Total Dose (Gy) Start Date End Date Elapsed [facility]   [facility] / 30 0 60 6/6/2022 7/18/2022 42          Chief Complaint: post op  HPI     Yoana Thomas returns for surveillance. Completed XRT 7/18/22. Doing well. No issues with scar. Using silicone tape and sunscreen as needed. Changing jobs later this week. No issues with healing.        Past Medical History:   Diagnosis Date    Anxiety     Attention deficit hyperactivity disorder (ADHD), predominantly inattentive type 06/14/2021    Hx of psychiatric care     Therapy        Past Surgical History:   Procedure Laterality Date    CREATION OF MUSCLE ROTATIONAL FLAP Left 3/21/2022    Procedure: CREATION, FLAP, MUSCLE ROTATION-SCM;  Surgeon: Edel Hall MD;  Location: [address];  Service: ENT;  Laterality: Left;    DISSECTION OF NECK Left 4/20/2022    Procedure: DISSECTION, NECK;  Surgeon: Edel Hall MD;  Location: [facility] OR [address] [place] [address];  Service: ENT;  Laterality: Left;    EXCISION OF PAROTID GLAND Left 3/21/2022    Procedure: EXCISION, PAROTID GLAND-nerve monitoring;  Surgeon: Edel Hall MD;  Location: [address];  Service: ENT;  Laterality: Left;    KELOID EXCISION      Left ear and she also had radiation Tx    WISDOM TOOTH EXTRACTION            Current Outpatient Medications:     diphth,pertus,acell,,tetanus (BOOSTRIX TDAP) 2.5-8-5 Lf-mcg-Lf/0.5mL Syrg injection, Inject into the muscle., Disp: 0.5 mL, Rfl: 0    loratadine (CLARITIN) 10 mg tablet, Take 1 tablet (10 mg total) by mouth once daily., Disp: 30 tablet, Rfl: 2    paroxetine (PAXIL) 40 MG tablet, Take 1 tablet (40 mg total) by mouth once daily., Disp: 90 tablet, Rfl: 3    Review of patient's allergies indicates:  No Known Allergies    Social History     Socioeconomic History    Marital status: Single   Tobacco Use    Smoking status: Never     Passive exposure: Never    Smokeless tobacco: Never   Substance and Sexual Activity    Alcohol use: Yes     Comment: once every couple mos.     Drug use: Never    Sexual activity: Yes     Partners: Male     Birth control/protection: Condom   Social History Narrative    Works for [employer]    Originally from [place]       Family History   Problem Relation Age of Onset    Skin cancer Father     Hypertension Father     Infertility Sister     Diabetes type II Maternal Grandfather     Diabetes type I Maternal Grandmother     Heart disease Maternal Grandmother     Kidney failure Maternal Grandmother         ESRD    Lymphoma Paternal Grandfather        Review of Systems   Constitutional: Negative.    HENT: Negative.    Eyes: Negative.    Respiratory: Negative.    Cardiovascular: Negative.    Gastrointestinal: Negative.    Endocrine: Negative.    Genitourinary: Negative.    Musculoskeletal: Negative.    Integumentary:  Negative.   Allergic/Immunologic: Negative.    Neurological: Negative.    Hematological: Negative.    Psychiatric/Behavioral: Negative.          Objective:      Physical Exam  Constitutional:       Appearance: Normal appearance.   HENT:      Head: Normocephalic and atraumatic.      Right Ear: External ear normal.      Left Ear: External ear normal.   Neck:      Comments: Modified Jose and neck dissection incision CDI.  No  redness, drainage, or fluid collection noted. No parotid or neck mass to palpation. Previous I&D site well healed.  Pulmonary:      Effort: Pulmonary effort is normal. No respiratory distress.   Neurological:      General: No focal deficit present.      Mental Status: She is alert.   Psychiatric:         Mood and Affect: Mood normal.         Behavior: Behavior normal.         Thought Content: Thought content normal.     CT Neck 4/20/23:  Postoperative changes of left parotidectomy and neck dissection for mucoid epidermoid carcinoma.  No new enhancement or nodular growth to suggest recurrent disease.  No evidence of metastatic disease.        Assessment:       Problem List Items Addressed This Visit    None                    Plan:       Problem List Items Addressed This Visit    None                  T2N1M0 mucoepidermoid carcinoma, intermediate grade, of the left parotid. Completed postoperative XRT 7/18/22. Doing well, HORACIO. Surveillance Neck CT HORACIO. Return to clinic in 3 months for surveillance or sooner as needed.

## 2023-09-25 ENCOUNTER — OFFICE VISIT (OUTPATIENT)
Dept: OTOLARYNGOLOGY | Facility: CLINIC | Age: 28
End: 2023-09-25
Payer: COMMERCIAL

## 2023-09-25 VITALS
BODY MASS INDEX: 34.37 KG/M2 | HEIGHT: 62 IN | HEART RATE: 75 BPM | WEIGHT: 186.75 LBS | SYSTOLIC BLOOD PRESSURE: 109 MMHG | DIASTOLIC BLOOD PRESSURE: 77 MMHG

## 2023-09-25 DIAGNOSIS — C08.9 MUCOEPIDERMOID CARCINOMA OF SALIVARY GLAND: Primary | ICD-10-CM

## 2023-09-25 PROCEDURE — 3078F DIAST BP <80 MM HG: CPT | Mod: CPTII,S$GLB,, | Performed by: OTOLARYNGOLOGY

## 2023-09-25 PROCEDURE — 99999 PR PBB SHADOW E&M-EST. PATIENT-LVL III: CPT | Mod: PBBFAC,,, | Performed by: OTOLARYNGOLOGY

## 2023-09-25 PROCEDURE — 3078F PR MOST RECENT DIASTOLIC BLOOD PRESSURE < 80 MM HG: ICD-10-PCS | Mod: CPTII,S$GLB,, | Performed by: OTOLARYNGOLOGY

## 2023-09-25 PROCEDURE — 99999 PR PBB SHADOW E&M-EST. PATIENT-LVL III: ICD-10-PCS | Mod: PBBFAC,,, | Performed by: OTOLARYNGOLOGY

## 2023-09-25 PROCEDURE — 99214 PR OFFICE/OUTPT VISIT, EST, LEVL IV, 30-39 MIN: ICD-10-PCS | Mod: S$GLB,,, | Performed by: OTOLARYNGOLOGY

## 2023-09-25 PROCEDURE — 3008F PR BODY MASS INDEX (BMI) DOCUMENTED: ICD-10-PCS | Mod: CPTII,S$GLB,, | Performed by: OTOLARYNGOLOGY

## 2023-09-25 PROCEDURE — 99214 OFFICE O/P EST MOD 30 MIN: CPT | Mod: S$GLB,,, | Performed by: OTOLARYNGOLOGY

## 2023-09-25 PROCEDURE — 3074F SYST BP LT 130 MM HG: CPT | Mod: CPTII,S$GLB,, | Performed by: OTOLARYNGOLOGY

## 2023-09-25 PROCEDURE — 1159F MED LIST DOCD IN RCRD: CPT | Mod: CPTII,S$GLB,, | Performed by: OTOLARYNGOLOGY

## 2023-09-25 PROCEDURE — 3074F PR MOST RECENT SYSTOLIC BLOOD PRESSURE < 130 MM HG: ICD-10-PCS | Mod: CPTII,S$GLB,, | Performed by: OTOLARYNGOLOGY

## 2023-09-25 PROCEDURE — 3008F BODY MASS INDEX DOCD: CPT | Mod: CPTII,S$GLB,, | Performed by: OTOLARYNGOLOGY

## 2023-09-25 PROCEDURE — 1159F PR MEDICATION LIST DOCUMENTED IN MEDICAL RECORD: ICD-10-PCS | Mod: CPTII,S$GLB,, | Performed by: OTOLARYNGOLOGY

## 2023-09-25 NOTE — PROGRESS NOTES
Subjective:       Patient ID: Yoana Thomas is a 28 y.o. female.    Oncology History   Mucoepidermoid carcinoma of salivary gland   3/21/2022 Surgery    EXCISION, PAROTID GLAND-nerve monitoring (Left)  CREATION, FLAP, MUSCLE ROTATION-SCM (Left)     4/4/2022 Initial Diagnosis    Mucoepidermoid carcinoma of salivary gland     4/4/2022 Cancer Staged    Staging form: Major Salivary Glands, AJCC 8th Edition  - Pathologic stage from 4/4/2022: Stage III (pT2, pN1, cM0)      Radiation Therapy    Treating physician: Alli Blanco  Total Dose: 60 Gy  Fractions: 30    Treatment Summary  Course: C1 H&N 2022  Treatment Site Ref. ID Energy Dose/Fx (Gy) #Fx Dose Correction (Gy) Total Dose (Gy) Start Date End Date Elapsed [facility]   [facility] / 30 0 60 6/6/2022 7/18/2022 42          Chief Complaint: post op  HPI     Yoana Thomas returns for surveillance. Completed XRT 7/18/22. Doing well. No issues with scar.  No new neck masses.      Past Medical History:   Diagnosis Date    Anxiety     Attention deficit hyperactivity disorder (ADHD), predominantly inattentive type 06/14/2021    Hx of psychiatric care     Therapy        Past Surgical History:   Procedure Laterality Date    CREATION OF MUSCLE ROTATIONAL FLAP Left 3/21/2022    Procedure: CREATION, FLAP, MUSCLE ROTATION-SCM;  Surgeon: Edel Hall MD;  Location: [address];  Service: ENT;  Laterality: Left;    DISSECTION OF NECK Left 4/20/2022    Procedure: DISSECTION, NECK;  Surgeon: Edel Hall MD;  Location: [facility] OR [address] [place] [address];  Service: ENT;  Laterality: Left;    EXCISION OF PAROTID GLAND Left 3/21/2022    Procedure: EXCISION, PAROTID GLAND-nerve monitoring;  Surgeon: Edel Hall MD;  Location: [facility] OR [address] [place] [address];  Service: ENT;  Laterality: Left;    KELOID EXCISION      Left ear and she also had radiation Tx    WISDOM TOOTH EXTRACTION           Current Outpatient Medications:     paroxetine (PAXIL) 40 MG tablet, Take 1 tablet  (40 mg total) by mouth once daily., Disp: 90 tablet, Rfl: 3    diphth,pertus,acell,,tetanus (BOOSTRIX TDAP) 2.5-8-5 Lf-mcg-Lf/0.5mL Syrg injection, Inject into the muscle. (Patient not taking: Reported on 9/25/2023), Disp: 0.5 mL, Rfl: 0    loratadine (CLARITIN) 10 mg tablet, Take 1 tablet (10 mg total) by mouth once daily. (Patient not taking: Reported on 9/25/2023), Disp: 30 tablet, Rfl: 2    Review of patient's allergies indicates:  No Known Allergies    Social History     Socioeconomic History    Marital status: Single   Tobacco Use    Smoking status: Never     Passive exposure: Never    Smokeless tobacco: Never   Substance and Sexual Activity    Alcohol use: Yes     Comment: once every couple mos.     Drug use: Never    Sexual activity: Yes     Partners: Male     Birth control/protection: Condom   Social History Narrative    Works for [employer] ([employer]    Originally from [place]       Family History   Problem Relation Age of Onset    Skin cancer Father     Hypertension Father     Infertility Sister     Diabetes type II Maternal Grandfather     Diabetes type I Maternal Grandmother     Heart disease Maternal Grandmother     Kidney failure Maternal Grandmother         ESRD    Lymphoma Paternal Grandfather        Review of Systems   Constitutional: Negative.    HENT: Negative.    Eyes: Negative.    Respiratory: Negative.    Cardiovascular: Negative.    Gastrointestinal: Negative.    Endocrine: Negative.    Genitourinary: Negative.    Musculoskeletal: Negative.    Integumentary:  Negative.   Allergic/Immunologic: Negative.    Neurological: Negative.    Hematological: Negative.    Psychiatric/Behavioral: Negative.          Objective:      Physical Exam  Constitutional:       Appearance: Normal appearance.   HENT:      Head: Normocephalic and atraumatic.      Right Ear: External ear normal.      Left Ear: External ear normal.   Neck:      Comments: Modified Jose and neck dissection incision CDI.  No  redness, drainage, or fluid collection noted. No parotid or neck mass to palpation. Previous I&D site well healed.  Pulmonary:      Effort: Pulmonary effort is normal. No respiratory distress.   Neurological:      General: No focal deficit present.      Mental Status: She is alert.   Psychiatric:         Mood and Affect: Mood normal.         Behavior: Behavior normal.         Thought Content: Thought content normal.     CT Neck 4/20/23:  Postoperative changes of left parotidectomy and neck dissection for mucoid epidermoid carcinoma.  No new enhancement or nodular growth to suggest recurrent disease.  No evidence of metastatic disease.        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 Completed postoperative XRT 7/18/22. Doing well, HORACIO. Surveillance Neck CT HORACIO. Return to clinic in 3 months for surveillance or sooner as needed.

## 2023-10-03 ENCOUNTER — LAB VISIT (OUTPATIENT)
Dept: LAB | Facility: HOSPITAL | Age: 28
End: 2023-10-03
Attending: INTERNAL MEDICINE
Payer: COMMERCIAL

## 2023-10-03 DIAGNOSIS — Z00.00 NORMAL PHYSICAL EXAM, ROUTINE: ICD-10-CM

## 2023-10-03 DIAGNOSIS — Z13.220 SCREENING FOR LIPOID DISORDERS: ICD-10-CM

## 2023-10-03 LAB
ALBUMIN SERPL BCP-MCNC: 4.1 G/DL (ref 3.5–5.2)
ALP SERPL-CCNC: 72 U/L (ref 55–135)
ALT SERPL W/O P-5'-P-CCNC: 21 U/L (ref 10–44)
ANION GAP SERPL CALC-SCNC: 10 MMOL/L (ref 8–16)
AST SERPL-CCNC: 21 U/L (ref 10–40)
BASOPHILS # BLD AUTO: 0.03 K/UL (ref 0–0.2)
BASOPHILS NFR BLD: 0.6 % (ref 0–1.9)
BILIRUB SERPL-MCNC: 0.5 MG/DL (ref 0.1–1)
BUN SERPL-MCNC: 10 MG/DL (ref 6–20)
CALCIUM SERPL-MCNC: 9.7 MG/DL (ref 8.7–10.5)
CHLORIDE SERPL-SCNC: 106 MMOL/L (ref 95–110)
CHOLEST SERPL-MCNC: 226 MG/DL (ref 120–199)
CHOLEST/HDLC SERPL: 5 {RATIO} (ref 2–5)
CO2 SERPL-SCNC: 25 MMOL/L (ref 23–29)
CREAT SERPL-MCNC: 0.8 MG/DL (ref 0.5–1.4)
DIFFERENTIAL METHOD: ABNORMAL
EOSINOPHIL # BLD AUTO: 0.1 K/UL (ref 0–0.5)
EOSINOPHIL NFR BLD: 1.2 % (ref 0–8)
ERYTHROCYTE [DISTWIDTH] IN BLOOD BY AUTOMATED COUNT: 12.5 % (ref 11.5–14.5)
EST. GFR  (NO RACE VARIABLE): >60 ML/MIN/1.73 M^2
GLUCOSE SERPL-MCNC: 101 MG/DL (ref 70–110)
HCT VFR BLD AUTO: 45.3 % (ref 37–48.5)
HDLC SERPL-MCNC: 45 MG/DL (ref 40–75)
HDLC SERPL: 19.9 % (ref 20–50)
HGB BLD-MCNC: 15 G/DL (ref 12–16)
IMM GRANULOCYTES # BLD AUTO: 0.03 K/UL (ref 0–0.04)
IMM GRANULOCYTES NFR BLD AUTO: 0.6 % (ref 0–0.5)
LDLC SERPL CALC-MCNC: 152 MG/DL (ref 63–159)
LYMPHOCYTES # BLD AUTO: 0.9 K/UL (ref 1–4.8)
LYMPHOCYTES NFR BLD: 17.2 % (ref 18–48)
MCH RBC QN AUTO: 29.3 PG (ref 27–31)
MCHC RBC AUTO-ENTMCNC: 33.1 G/DL (ref 32–36)
MCV RBC AUTO: 89 FL (ref 82–98)
MONOCYTES # BLD AUTO: 0.5 K/UL (ref 0.3–1)
MONOCYTES NFR BLD: 9.1 % (ref 4–15)
NEUTROPHILS # BLD AUTO: 3.6 K/UL (ref 1.8–7.7)
NEUTROPHILS NFR BLD: 71.3 % (ref 38–73)
NONHDLC SERPL-MCNC: 181 MG/DL
NRBC BLD-RTO: 0 /100 WBC
PLATELET # BLD AUTO: 281 K/UL (ref 150–450)
PMV BLD AUTO: 10.5 FL (ref 9.2–12.9)
POTASSIUM SERPL-SCNC: 4.2 MMOL/L (ref 3.5–5.1)
PROT SERPL-MCNC: 7.5 G/DL (ref 6–8.4)
RBC # BLD AUTO: 5.12 M/UL (ref 4–5.4)
SODIUM SERPL-SCNC: 141 MMOL/L (ref 136–145)
TRIGL SERPL-MCNC: 145 MG/DL (ref 30–150)
TSH SERPL DL<=0.005 MIU/L-ACNC: 3.77 UIU/ML (ref 0.4–4)
WBC # BLD AUTO: 5.05 K/UL (ref 3.9–12.7)

## 2023-10-03 PROCEDURE — 84443 ASSAY THYROID STIM HORMONE: CPT | Performed by: INTERNAL MEDICINE

## 2023-10-03 PROCEDURE — 80061 LIPID PANEL: CPT | Performed by: INTERNAL MEDICINE

## 2023-10-03 PROCEDURE — 85025 COMPLETE CBC W/AUTO DIFF WBC: CPT | Performed by: INTERNAL MEDICINE

## 2023-10-03 PROCEDURE — 36415 COLL VENOUS BLD VENIPUNCTURE: CPT | Performed by: INTERNAL MEDICINE

## 2023-10-03 PROCEDURE — 80053 COMPREHEN METABOLIC PANEL: CPT | Performed by: INTERNAL MEDICINE

## 2023-10-10 NOTE — PROGRESS NOTES
I sent pt a my chart message -  I reviewed your  labs.  Your thyroid functions are normal.  Your cholesterol was slightly high. You do not require medication for this at this time but I do recommend you follow a low cholesterol diet and exercise.  You can visit the American heart association website at heart.org for further info on a low cholesterol diet. Your kidney function and liver functions looked good.  You are not anemic. No further recommendations at this time.    Dr. GRIFFITHS

## 2023-10-18 NOTE — PROGRESS NOTES
[facility] Department of Radiation Oncology  Follow Up Visit Note    Diagnosis:  Yoana Thomas is a 28 y.o. female with T2N1M0, group stage III, mucoepidermoid carcinoma of the left parotid s/p left parotid excision (3/21/22) and left neck dissection (4/20/22). She was treated with adjuvant radiation to 60 Gy in 30 fractions to the parotid operative bed and 54 Gy in 30 fractions to the left neck, completed 7/18/22.     Oncologic History:  She has a history of a keloid of the left earlobe treated with RT in approximately 2016 in [facility] She presented with a left neck mass.  2/16/22: CT neck with a 2.1cm mixed sold and cystic mass in the left superficial parotid with an additional enlarged 9 mm parotid node and prominent left neck adenopathy  2/16/22: FNA in clinic with atypical cells  3/21/22: left parotid gland excision with muscle rotation flap.  Path: 2.2 cm intermediate grade mucoepidermoid carcinoma with no extraparenchymal extension. - SM at 1mm, -PNI, +LVSI. 1/4 LN + for metastatic carcinoma (largest deposit 2mm), no CHARLES  4/8/22: PET/CT with no FDG avid tumor. Mild uptake in the left parotid gland possibly from prior surgery. No FDG avid lymphadenopathy.  4/20/22: left neck level II-IV dissection, with 0-29 LN+ for carcinoma  6/6/22-7/18/22: 60 Gy in 30 fractions to the parotid operative bed and 54 Gy in 30 fractions to the left neck (level IB-IV).    Interval History  The patient presents today for follow up.    Since last visit, she met with Dr. Hall on 9/25/23 with HORACIO on exam.     Had a week of inability to clear her left inner ear which self resolved. Reports slight decreased hearing on left compared to right. Also has tightness in left paraspinal muscles for past few months. No otalgia, throat pain, facial numbness or weakness neck masses.     Review of Systems   ROS as above    Social History:  Social History     Tobacco Use    Smoking status: Never     Passive exposure:  "Never    Smokeless tobacco: Never   Substance Use Topics    Alcohol use: Yes     Comment: once every couple mos.     Drug use: Never       Exam:  Vitals:    10/19/23 1108   BP: 120/72   BP Location: Right arm   Patient Position: Sitting   BP Method: Large (Manual)   Pulse: 87   SpO2: 97%   Height: 5' 2" (1.575 m)       Constitutional: Pleasant 28 y.o. female in no acute distress.  Well nourished. Well groomed.   HEENT: no facial asymmetry, sensation intact to light touch in V1-3 bilaterally. no appreciated OC or opx lesions on direct exam. Moist mucus membranes. No appreciated OC or OPX scans. Good dentition  Lymph: no appreciated pre-auricular or cervical adenopathy. No paraspinal masses appreciated.   Lungs: No audible wheezing.  Normal effort.   Musculoskeletal: No gross MSK deformities.  Skin: No rashes appreciated.   Psych: Alert and oriented with appropriate mood and affect.  Neuro: Grossly normal.      Data Review:    Information obtained via chart review and discussion with Ms. Thomas      Assessment:  T2N1M0, group stage III, mucoepidermoid carcinoma of the left parotid s/p left parotid excision (3/21/22) and left neck dissection (4/20/22). She was treated with adjuvant radiation to 60 Gy in 30 fractions to the parotid operative bed and 54 Gy in 30 fractions to the left neck, completed 7/18/22.  She has a history of prior left ear radiation for a keloid (unable to obtain prior records)  Doing well, with HORACIO on exam.   HORACIO on CT neck and chest 4/2023.  TSH WNL 10/3/23 with PCP.  ECOG: (0) Fully active, able to carry on all predisease performance without restriction    Plan:  Left paraspinal neck tightness. She will try stretching exercises at home. If persists with re-discuss with H&N surgery and will consider re imaging and/or PT  Recommend she have close dental follow up and use fluoride toothpaste.   Will see H&N surgery in 3 months.   RTC 6 months or earlier FELECIA Blanco MD  Radiation " Oncology

## 2023-10-19 ENCOUNTER — OFFICE VISIT (OUTPATIENT)
Dept: RADIATION ONCOLOGY | Facility: CLINIC | Age: 28
End: 2023-10-19
Payer: COMMERCIAL

## 2023-10-19 VITALS
HEART RATE: 87 BPM | DIASTOLIC BLOOD PRESSURE: 72 MMHG | OXYGEN SATURATION: 97 % | SYSTOLIC BLOOD PRESSURE: 120 MMHG | BODY MASS INDEX: 33.78 KG/M2 | HEIGHT: 62 IN | WEIGHT: 183.56 LBS

## 2023-10-19 DIAGNOSIS — C08.9 MUCOEPIDERMOID CARCINOMA OF SALIVARY GLAND: Primary | ICD-10-CM

## 2023-10-19 PROCEDURE — 3074F PR MOST RECENT SYSTOLIC BLOOD PRESSURE < 130 MM HG: ICD-10-PCS | Mod: CPTII,S$GLB,, | Performed by: STUDENT IN AN ORGANIZED HEALTH CARE EDUCATION/TRAINING PROGRAM

## 2023-10-19 PROCEDURE — 3008F BODY MASS INDEX DOCD: CPT | Mod: CPTII,S$GLB,, | Performed by: STUDENT IN AN ORGANIZED HEALTH CARE EDUCATION/TRAINING PROGRAM

## 2023-10-19 PROCEDURE — 3078F PR MOST RECENT DIASTOLIC BLOOD PRESSURE < 80 MM HG: ICD-10-PCS | Mod: CPTII,S$GLB,, | Performed by: STUDENT IN AN ORGANIZED HEALTH CARE EDUCATION/TRAINING PROGRAM

## 2023-10-19 PROCEDURE — 3008F PR BODY MASS INDEX (BMI) DOCUMENTED: ICD-10-PCS | Mod: CPTII,S$GLB,, | Performed by: STUDENT IN AN ORGANIZED HEALTH CARE EDUCATION/TRAINING PROGRAM

## 2023-10-19 PROCEDURE — 99999 PR PBB SHADOW E&M-EST. PATIENT-LVL III: ICD-10-PCS | Mod: PBBFAC,,, | Performed by: STUDENT IN AN ORGANIZED HEALTH CARE EDUCATION/TRAINING PROGRAM

## 2023-10-19 PROCEDURE — 1159F PR MEDICATION LIST DOCUMENTED IN MEDICAL RECORD: ICD-10-PCS | Mod: CPTII,S$GLB,, | Performed by: STUDENT IN AN ORGANIZED HEALTH CARE EDUCATION/TRAINING PROGRAM

## 2023-10-19 PROCEDURE — 99213 PR OFFICE/OUTPT VISIT, EST, LEVL III, 20-29 MIN: ICD-10-PCS | Mod: S$GLB,,, | Performed by: STUDENT IN AN ORGANIZED HEALTH CARE EDUCATION/TRAINING PROGRAM

## 2023-10-19 PROCEDURE — 3074F SYST BP LT 130 MM HG: CPT | Mod: CPTII,S$GLB,, | Performed by: STUDENT IN AN ORGANIZED HEALTH CARE EDUCATION/TRAINING PROGRAM

## 2023-10-19 PROCEDURE — 99999 PR PBB SHADOW E&M-EST. PATIENT-LVL III: CPT | Mod: PBBFAC,,, | Performed by: STUDENT IN AN ORGANIZED HEALTH CARE EDUCATION/TRAINING PROGRAM

## 2023-10-19 PROCEDURE — 1159F MED LIST DOCD IN RCRD: CPT | Mod: CPTII,S$GLB,, | Performed by: STUDENT IN AN ORGANIZED HEALTH CARE EDUCATION/TRAINING PROGRAM

## 2023-10-19 PROCEDURE — 99213 OFFICE O/P EST LOW 20 MIN: CPT | Mod: S$GLB,,, | Performed by: STUDENT IN AN ORGANIZED HEALTH CARE EDUCATION/TRAINING PROGRAM

## 2023-10-19 PROCEDURE — 3078F DIAST BP <80 MM HG: CPT | Mod: CPTII,S$GLB,, | Performed by: STUDENT IN AN ORGANIZED HEALTH CARE EDUCATION/TRAINING PROGRAM

## 2023-10-22 NOTE — PROGRESS NOTES
[facility] Note    Chief Complaint      Chief Complaint   Patient presents with    Follow-up       History of Present Illness      Yoana Thomas is a 28 y.o.  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  Referred by Prev PCP, Dr. Heike Pimentel. 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 Castro 2/7/22. Fu by ENT, Dr. Hall. CT neck - 3/4/22. S/p FNA - consistent with parotid low-grade neoplasm. S/p parotidectomy 3/21/22 - Mucoepidermoid carcinoma of salivary gland. PET CT 4/8/22.  S/P Left Neck Dissection 4/20/22. Fu by Rad/Onc. S/p adjuvant radiation which she completed 7/18/22.  Fu by Speech Tx.   CTA neck 9/17/22 - Postoperative change of left parotid mucoepidermoid carcinoma resection.  Superficial thickening and heterogeneous enhancement of the gland which may be related to postoperative or radiation changes.  5 mm soft tissue density in the superficial left parotid that may represent an intraparotid lymph node, however attention on follow-up exams is recommended. Abscess drained 9/20/22  CT chest - 4/20/23 - No evidence of metastatic disease. Fu with Dr. Hall in Dec. and Dr. Blanco in Apr.  Thyroid functions are normal. Fu TSH Q 6 mos. Left paraspinal neck tightness. She will try stretching exercises at home. If persists rec consider PT     Borderline HLD - cholesterol was borderline high. She does not require medication for this at this time but I do recommend you follow a low cholesterol diet and exercise.  She gave up red meat Lent.      RT lung nodule - Fu by CT q 6 mos.       Anxiety - Pt on Paxil 40 mg/d for the past 2-3 yrs. She is doing well on this.      ADHD - Pt not currently on meds for this.      Obesity - BMI - 33.87- down 9 lb since last visit. Sh harika is eating better and changed jobs and is exercising more. She is less stressed.   She was 89 at beg.  of pandemic.  We discussed pharmacotherapy. Given RadTx to the neck I would be hesitant to start Ozempic or mounjaro due to potential for inc risk of MTC. Will try lifestyle modification. Could consider adipex, contrave, Qsymia. She  cut out red meat again which helped with wt loss in past. She plans to cut back on gluten because she feels better off of it.       Lab review: 1/26/21 HA1c - 5.5;  TSH - 2.76;   HDL 51 ; BUN/Cr - 12/0.77; H/H - 14.2/41.5     HCM - Flu - 10/19/22 - defers;  Tdap -  4/19/23; HPV vaccine - # 1 8/2/22 and # 2;   COVID - 19 Vaccine (Pfizer) #1 - 3/30/21; #2 - 4/23/21; #3 ; MGM - none;  DEXA - none;  PAP -8/2/22- neg ; Hep C Screen - 9/16/22 - neg.;  HIV Screen -  9/16/22 - neg.; C-scope - none; Prev PCP - Dr. Heike Pimentel; Prev OB/GYN - in [place]; well visit - 10/25/23    Patient Care Team:  Abbey Leyva MD as PCP - General (Internal Medicine)     Health Maintenance:  Immunization History   Administered Date(s) Administered    COVID-19 Vaccine 04/01/2021    COVID-19, MRNA, LN-S, PF (Pfizer) (Purple Cap) 03/30/2021, 04/23/2021    HPV 9-Valent 08/02/2022, 10/19/2022    Influenza - Quadrivalent 10/01/2020    Influenza - Quadrivalent - PF *Preferred* (6 months and older) 10/19/2022    Tdap 04/19/2023      Health Maintenance   Topic Date Due    Pap Smear  08/02/2025    TETANUS VACCINE  04/19/2033    Hepatitis C Screening  Completed    Lipid Panel  Completed        Past Medical History:  Past Medical History:   Diagnosis Date    Anxiety     Attention deficit hyperactivity disorder (ADHD), predominantly inattentive type 06/14/2021    Hx of psychiatric care     Therapy        Past Surgical History:   has a past surgical history that includes Keloid excision; [place] tooth extraction; Excision of parotid gland (Left, 3/21/2022); Creation of muscle rotational flap (Left, 3/21/2022); and Dissection of neck (Left, 4/20/2022).    Family History:  family history includes  Diabetes type I in her maternal grandmother; Diabetes type II in her maternal grandfather; Heart disease in her maternal grandmother; Hypertension in her father; Infertility in her sister; Kidney failure in her maternal grandmother; Lymphoma in her paternal grandfather; Skin cancer in her father.     Social History:  Social History     Tobacco Use    Smoking status: Never     Passive exposure: Never    Smokeless tobacco: Never   Substance Use Topics    Alcohol use: Yes     Comment: once every couple mos.     Drug use: Never       Review of Systems   Constitutional:  Negative for chills, diaphoresis and fever.   HENT:  Negative for hearing loss.    Eyes:  Negative for visual disturbance.   Respiratory:  Negative for chest tightness and shortness of breath.    Cardiovascular:  Negative for chest pain and palpitations.   Gastrointestinal:  Negative for abdominal pain, blood in stool, constipation, diarrhea, nausea and vomiting.   Endocrine: Positive for polydipsia. Negative for cold intolerance and heat intolerance.        Dry mouth   Genitourinary:  Negative for dysuria and frequency.   Musculoskeletal:  Positive for neck pain. Negative for arthralgias and myalgias.   Neurological:  Negative for dizziness and headaches.   Psychiatric/Behavioral:  Negative for dysphoric mood and suicidal ideas. The patient is not nervous/anxious.         Medications:    Current Outpatient Medications:     loratadine (CLARITIN) 10 mg tablet, Take 1 tablet (10 mg total) by mouth once daily. (Patient not taking: Reported on 9/25/2023), Disp: 30 tablet, Rfl: 2    nystatin (MYCOSTATIN) 100,000 unit/mL suspension, Take 4 mLs (400,000 Units total) by mouth 4 (four) times daily. for 10 days, Disp: 160 mL, Rfl: 0    paroxetine (PAXIL) 40 MG tablet, Take 1 tablet (40 mg total) by mouth once daily., Disp: 90 tablet, Rfl: 3     Allergies:  Review of patient's allergies indicates:  No Known Allergies    Physical Exam      Vital Signs  Temp: 98.4 °F  "(36.9 °C)  Temp Source: Oral  Pulse: 79  SpO2: 97 %  BP: 108/80  BP Location: Left arm  Patient Position: Sitting  Pain Score: 0-No pain  Height and Weight  Height: 5' 2" (157.5 cm)  Weight: 84 kg (185 lb 3 oz)  BSA (Calculated - sq m): 1.92 sq meters  BMI (Calculated): 33.9  Weight in (lb) to have BMI = 25: 136.4      Patient Position: Sitting      Physical Exam  Vitals reviewed.   Constitutional:       General: She is not in acute distress.     Appearance: Normal appearance. She is not ill-appearing, toxic-appearing or diaphoretic.   HENT:      Head: Normocephalic and atraumatic.      Right Ear: Tympanic membrane normal.      Left Ear: Tympanic membrane normal.      Mouth/Throat:      Mouth: Mucous membranes are moist.      Comments: White plaque to lateral tongue and buccal mucosa  Eyes:      Extraocular Movements: Extraocular movements intact.      Conjunctiva/sclera: Conjunctivae normal.      Pupils: Pupils are equal, round, and reactive to light.   Neck:      Vascular: No carotid bruit.      Comments: Healed scar to Left neck  Cardiovascular:      Rate and Rhythm: Normal rate and regular rhythm.      Pulses: Normal pulses.      Heart sounds: Normal heart sounds.   Pulmonary:      Effort: Pulmonary effort is normal. No respiratory distress.      Breath sounds: Normal breath sounds.   Abdominal:      General: Bowel sounds are normal. There is no distension.      Palpations: Abdomen is soft.      Tenderness: There is no abdominal tenderness. There is no guarding or rebound.   Musculoskeletal:      Cervical back: Neck supple. No tenderness.   Neurological:      General: No focal deficit present.      Mental Status: She is alert and oriented to person, place, and time.   Psychiatric:         Mood and Affect: Mood normal.         Behavior: Behavior normal.          Laboratory:  CBC:  Recent Labs   Lab 02/07/22  1620 09/17/22  0210 09/17/22  0220 10/03/23  0908   WBC 8.87 7.70  --  5.05   RBC 4.90 4.56  --  5.12 "   Hemoglobin 14.2 14.0  --  15.0   POC Hematocrit  --   --    < >  --    Hematocrit 43.6 40.4  --  45.3   Platelets 306 274  --  281   MCV 89 89  --  89   MCH 29.0 30.7  --  29.3   MCHC 32.6 34.7  --  33.1    < > = values in this interval not displayed.       CMP:  Recent Labs   Lab 09/17/22  0210 10/03/23  0908   Glucose 104 101   Calcium 9.7 9.7   Albumin 3.8 4.1   Total Protein 7.2 7.5   Sodium 137 141   Potassium 3.6 4.2   CO2 24 25   Chloride 104 106   BUN 16 10   Creatinine 0.8 0.8   Alkaline Phosphatase 81 72   ALT 15 21   AST 13 21   Total Bilirubin 0.2 0.5     LIPIDS:  Recent Labs   Lab 08/29/22  1345 10/19/22  0842 04/19/23  0906 10/03/23  0908   TSH 1.112  --  2.226 3.773   HDL  --  59  --  45   Cholesterol  --  218 H  --  226 H   Triglycerides  --  100  --  145   LDL Cholesterol  --  139.0  --  152.0   HDL/Cholesterol Ratio  --  27.1  --  19.9 L   Non-HDL Cholesterol  --  159  --  181   Total Cholesterol/HDL Ratio  --  3.7  --  5.0       TSH:  Recent Labs   Lab 08/29/22  1345 04/19/23  0906 10/03/23  0908   TSH 1.112 2.226 3.773       Other:   Recent Labs   Lab 08/29/22  1345   Follicle Stimulating Hormone 5.08   Estradiol 54     Recent Labs   Lab 09/17/22  0210   Hepatitis C Ab Non-reactive       Assessment/Plan     Yoana Thomas is a 28 y.o.female with:    Normal physical exam, routine  -     CBC Auto Differential; Future; Expected date: 10/25/2024  -     Comprehensive Metabolic Panel; Future; Expected date: 10/25/2024  -     TSH; Future; Expected date: 10/25/2024  -     Hemoglobin A1C; Future; Expected date: 10/25/2024  - Performed today.  Will check Basic labs.  RTC in 1 yr for fu or sooner if needed    Anxiety  -     paroxetine (PAXIL) 40 MG tablet; Take 1 tablet (40 mg total) by mouth once daily.  Dispense: 90 tablet; Refill: 3  - Controlled.  Cont current.     Mucoepidermoid carcinoma of salivary gland  -     TSH; Future; Expected date: 04/25/2024  - Stable.  Cont current regimen.    Class  1 obesity with body mass index (BMI) of 33.0 to 33.9 in adult, unspecified obesity type, unspecified whether serious comorbidity present  - Congratulated on wt loss.  Rec continued diet and exercise   for wt loss.      Thrush  -     nystatin (MYCOSTATIN) 100,000 unit/mL suspension; Take 4 mLs (400,000 Units total) by mouth 4 (four) times daily. for 10 days  Dispense: 160 mL; Refill: 0    History of head and neck radiation  -     TSH; Future; Expected date: 04/25/2024    Screening for lipoid disorders  -     Lipid Panel; Future; Expected date: 10/25/2024         Chronic conditions status updated as per HPI.  Other than changes above, cont current medications and maintain follow up with specialists.  Follow up in about 1 year (around 10/25/2024) for well visit or sooner if needed.      Abbey Leyva MD  [facility]

## 2023-10-25 ENCOUNTER — OFFICE VISIT (OUTPATIENT)
Dept: PRIMARY CARE CLINIC | Facility: CLINIC | Age: 28
End: 2023-10-25
Payer: COMMERCIAL

## 2023-10-25 VITALS
DIASTOLIC BLOOD PRESSURE: 80 MMHG | OXYGEN SATURATION: 97 % | HEIGHT: 62 IN | SYSTOLIC BLOOD PRESSURE: 108 MMHG | TEMPERATURE: 98 F | BODY MASS INDEX: 34.08 KG/M2 | HEART RATE: 79 BPM | WEIGHT: 185.19 LBS

## 2023-10-25 DIAGNOSIS — F41.9 ANXIETY: ICD-10-CM

## 2023-10-25 DIAGNOSIS — B37.0 THRUSH: ICD-10-CM

## 2023-10-25 DIAGNOSIS — Z92.3 HISTORY OF HEAD AND NECK RADIATION: ICD-10-CM

## 2023-10-25 DIAGNOSIS — C08.9 MUCOEPIDERMOID CARCINOMA OF SALIVARY GLAND: ICD-10-CM

## 2023-10-25 DIAGNOSIS — Z00.00 NORMAL PHYSICAL EXAM, ROUTINE: Primary | ICD-10-CM

## 2023-10-25 DIAGNOSIS — Z13.220 SCREENING FOR LIPOID DISORDERS: ICD-10-CM

## 2023-10-25 DIAGNOSIS — E66.9 CLASS 1 OBESITY WITH BODY MASS INDEX (BMI) OF 33.0 TO 33.9 IN ADULT, UNSPECIFIED OBESITY TYPE, UNSPECIFIED WHETHER SERIOUS COMORBIDITY PRESENT: ICD-10-CM

## 2023-10-25 PROCEDURE — 1159F PR MEDICATION LIST DOCUMENTED IN MEDICAL RECORD: ICD-10-PCS | Mod: CPTII,S$GLB,, | Performed by: INTERNAL MEDICINE

## 2023-10-25 PROCEDURE — 3008F PR BODY MASS INDEX (BMI) DOCUMENTED: ICD-10-PCS | Mod: CPTII,S$GLB,, | Performed by: INTERNAL MEDICINE

## 2023-10-25 PROCEDURE — 1160F PR REVIEW ALL MEDS BY PRESCRIBER/CLIN PHARMACIST DOCUMENTED: ICD-10-PCS | Mod: CPTII,S$GLB,, | Performed by: INTERNAL MEDICINE

## 2023-10-25 PROCEDURE — 99999 PR PBB SHADOW E&M-EST. PATIENT-LVL III: CPT | Mod: PBBFAC,,, | Performed by: INTERNAL MEDICINE

## 2023-10-25 PROCEDURE — 3079F DIAST BP 80-89 MM HG: CPT | Mod: CPTII,S$GLB,, | Performed by: INTERNAL MEDICINE

## 2023-10-25 PROCEDURE — 1160F RVW MEDS BY RX/DR IN RCRD: CPT | Mod: CPTII,S$GLB,, | Performed by: INTERNAL MEDICINE

## 2023-10-25 PROCEDURE — 3079F PR MOST RECENT DIASTOLIC BLOOD PRESSURE 80-89 MM HG: ICD-10-PCS | Mod: CPTII,S$GLB,, | Performed by: INTERNAL MEDICINE

## 2023-10-25 PROCEDURE — 3008F BODY MASS INDEX DOCD: CPT | Mod: CPTII,S$GLB,, | Performed by: INTERNAL MEDICINE

## 2023-10-25 PROCEDURE — 3074F PR MOST RECENT SYSTOLIC BLOOD PRESSURE < 130 MM HG: ICD-10-PCS | Mod: CPTII,S$GLB,, | Performed by: INTERNAL MEDICINE

## 2023-10-25 PROCEDURE — 99395 PREV VISIT EST AGE 18-39: CPT | Mod: S$GLB,,, | Performed by: INTERNAL MEDICINE

## 2023-10-25 PROCEDURE — 3074F SYST BP LT 130 MM HG: CPT | Mod: CPTII,S$GLB,, | Performed by: INTERNAL MEDICINE

## 2023-10-25 PROCEDURE — 99395 PR PREVENTIVE VISIT,EST,18-39: ICD-10-PCS | Mod: S$GLB,,, | Performed by: INTERNAL MEDICINE

## 2023-10-25 PROCEDURE — 99999 PR PBB SHADOW E&M-EST. PATIENT-LVL III: ICD-10-PCS | Mod: PBBFAC,,, | Performed by: INTERNAL MEDICINE

## 2023-10-25 PROCEDURE — 1159F MED LIST DOCD IN RCRD: CPT | Mod: CPTII,S$GLB,, | Performed by: INTERNAL MEDICINE

## 2023-10-25 RX ORDER — PAROXETINE HYDROCHLORIDE 40 MG/1
40 TABLET, FILM COATED ORAL DAILY
Qty: 90 TABLET | Refills: 3 | Status: SHIPPED | OUTPATIENT
Start: 2023-10-25 | End: 2023-11-17

## 2023-10-25 RX ORDER — NYSTATIN 100000 [USP'U]/ML
4 SUSPENSION ORAL 4 TIMES DAILY
Qty: 160 ML | Refills: 0 | Status: SHIPPED | OUTPATIENT
Start: 2023-10-25 | End: 2023-11-04

## 2023-11-17 DIAGNOSIS — F41.9 ANXIETY: ICD-10-CM

## 2023-11-17 RX ORDER — PAROXETINE HYDROCHLORIDE 40 MG/1
40 TABLET, FILM COATED ORAL
Qty: 90 TABLET | Refills: 3 | Status: SHIPPED | OUTPATIENT
Start: 2023-11-17

## 2023-11-17 NOTE — TELEPHONE ENCOUNTER
Refill Decision Note   Yoana William  is requesting a refill authorization.  Brief Assessment and Rationale for Refill:  Approve     Medication Therapy Plan:         Comments:     Note composed:1:31 PM 11/17/2023

## 2023-11-17 NOTE — TELEPHONE ENCOUNTER
No care due was identified.  [facility] Embedded Care Due Messages. Reference number: [id number].   11/17/2023 3:27:06 AM CST

## 2024-01-03 ENCOUNTER — OFFICE VISIT (OUTPATIENT)
Dept: OTOLARYNGOLOGY | Facility: CLINIC | Age: 29
End: 2024-01-03
Payer: COMMERCIAL

## 2024-01-03 VITALS
HEART RATE: 77 BPM | DIASTOLIC BLOOD PRESSURE: 71 MMHG | SYSTOLIC BLOOD PRESSURE: 120 MMHG | WEIGHT: 182.31 LBS | BODY MASS INDEX: 33.35 KG/M2

## 2024-01-03 DIAGNOSIS — C08.9 MUCOEPIDERMOID CARCINOMA OF SALIVARY GLAND: Primary | ICD-10-CM

## 2024-01-03 PROCEDURE — 3074F SYST BP LT 130 MM HG: CPT | Mod: CPTII,S$GLB,, | Performed by: OTOLARYNGOLOGY

## 2024-01-03 PROCEDURE — 3008F BODY MASS INDEX DOCD: CPT | Mod: CPTII,S$GLB,, | Performed by: OTOLARYNGOLOGY

## 2024-01-03 PROCEDURE — 99999 PR PBB SHADOW E&M-EST. PATIENT-LVL II: CPT | Mod: PBBFAC,,, | Performed by: OTOLARYNGOLOGY

## 2024-01-03 PROCEDURE — 3078F DIAST BP <80 MM HG: CPT | Mod: CPTII,S$GLB,, | Performed by: OTOLARYNGOLOGY

## 2024-01-03 PROCEDURE — 99214 OFFICE O/P EST MOD 30 MIN: CPT | Mod: S$GLB,,, | Performed by: OTOLARYNGOLOGY

## 2024-01-03 PROCEDURE — 1159F MED LIST DOCD IN RCRD: CPT | Mod: CPTII,S$GLB,, | Performed by: OTOLARYNGOLOGY

## 2024-01-03 NOTE — PROGRESS NOTES
Subjective:       Patient ID: Yoana Thomas is a 28 y.o. female.    Oncology History   Mucoepidermoid carcinoma of salivary gland   3/21/2022 Surgery    EXCISION, PAROTID GLAND-nerve monitoring (Left)  CREATION, FLAP, MUSCLE ROTATION-SCM (Left)     4/4/2022 Initial Diagnosis    Mucoepidermoid carcinoma of salivary gland     4/4/2022 Cancer Staged    Staging form: Major Salivary Glands, AJCC 8th Edition  - Pathologic stage from 4/4/2022: Stage III (pT2, pN1, cM0)      Radiation Therapy    Treating physician: Alli Blanco  Total Dose: 60 Gy  Fractions: 30    Treatment Summary  Course: C1 H&N 2022  Treatment Site Ref. ID Energy Dose/Fx (Gy) #Fx Dose Correction (Gy) Total Dose (Gy) Start Date End Date Elapsed Days   IM HeadNe Head&Neck 6X 2 30 / 30 0 60 6/6/2022 7/18/2022 42          Chief Complaint: post op  HPI     Yoana Thomas returns for surveillance. Completed XRT 7/18/22. Doing well. No issues with scar.  No new neck masses. Some intermittent neck pain, none currently. Likely related to positioning.       Past Medical History:   Diagnosis Date    Anxiety     Attention deficit hyperactivity disorder (ADHD), predominantly inattentive type 06/14/2021    Hx of psychiatric care     Therapy        Past Surgical History:   Procedure Laterality Date    CREATION OF MUSCLE ROTATIONAL FLAP Left 3/21/2022    Procedure: CREATION, FLAP, MUSCLE ROTATION-SCM;  Surgeon: Edel Hall MD;  Location: [address];  Service: ENT;  Laterality: Left;    DISSECTION OF NECK Left 4/20/2022    Procedure: DISSECTION, NECK;  Surgeon: Edel Hall MD;  Location: [address];  Service: ENT;  Laterality: Left;    EXCISION OF PAROTID GLAND Left 3/21/2022    Procedure: EXCISION, PAROTID GLAND-nerve monitoring;  Surgeon: Edel Hall MD;  Location: [address];  Service: ENT;  Laterality: Left;    KELOID EXCISION      Left ear and she also had radiation Tx    WISDOM TOOTH EXTRACTION            Current Outpatient Medications:     loratadine (CLARITIN) 10 mg tablet, Take 1 tablet (10 mg total) by mouth once daily. (Patient not taking: Reported on 9/25/2023), Disp: 30 tablet, Rfl: 2    paroxetine (PAXIL) 40 MG tablet, TAKE 1 TABLET(40 MG) BY MOUTH EVERY DAY, Disp: 90 tablet, Rfl: 3    Review of patient's allergies indicates:  No Known Allergies    Social History     Socioeconomic History    Marital status: Single   Tobacco Use    Smoking status: Never     Passive exposure: Never    Smokeless tobacco: Never   Substance and Sexual Activity    Alcohol use: Yes     Comment: once every couple mos.     Drug use: Never    Sexual activity: Not Currently     Partners: Male     Birth control/protection: Condom   Social History Narrative    Works for [employer] ([employer])    Originally from [place]       Family History   Problem Relation Age of Onset    Skin cancer Father     Hypertension Father     Infertility Sister     Diabetes type II Maternal Grandfather     Diabetes type I Maternal Grandmother     Heart disease Maternal Grandmother     Kidney failure Maternal Grandmother         ESRD    Lymphoma Paternal Grandfather        Review of Systems   Constitutional: Negative.    HENT: Negative.    Eyes: Negative.    Respiratory: Negative.    Cardiovascular: Negative.    Gastrointestinal: Negative.    Endocrine: Negative.    Genitourinary: Negative.    Musculoskeletal: Negative.    Integumentary:  Negative.   Allergic/Immunologic: Negative.    Neurological: Negative.    Hematological: Negative.    Psychiatric/Behavioral: Negative.          Objective:      Physical Exam  Constitutional:       Appearance: Normal appearance.   HENT:      Head: Normocephalic and atraumatic.      Right Ear: External ear normal.      Left Ear: External ear normal.   Neck:      Comments: Modified Jose and neck dissection incision CDI.  No redness, drainage, or fluid collection noted. No parotid or neck mass to palpation. No  evidence of keloid.  Pulmonary:      Effort: Pulmonary effort is normal. No respiratory distress.   Neurological:      General: No focal deficit present.      Mental Status: She is alert.   Psychiatric:         Mood and Affect: Mood normal.         Behavior: Behavior normal.         Thought Content: Thought content normal.     CT Neck 4/20/23:  Postoperative changes of left parotidectomy and neck dissection for mucoid epidermoid carcinoma.  No new enhancement or nodular growth to suggest recurrent disease.  No evidence of metastatic disease.        Assessment:       Problem List Items Addressed This Visit          Oncology    Mucoepidermoid carcinoma of salivary gland - Primary                       Plan:       Problem List Items Addressed This Visit          Oncology    Mucoepidermoid carcinoma of salivary gland - Primary                     T2N1M0 mucoepidermoid carcinoma, intermediate grade, of the left parotid. Completed postoperative XRT 7/18/22. Doing well, HORACIO. Surveillance Neck CT HORACIO. Return to clinic in 3 months for surveillance or sooner as needed.

## 2024-04-17 ENCOUNTER — OFFICE VISIT (OUTPATIENT)
Dept: OTOLARYNGOLOGY | Facility: CLINIC | Age: 29
End: 2024-04-17
Payer: COMMERCIAL

## 2024-04-17 VITALS
WEIGHT: 167.75 LBS | HEIGHT: 62 IN | HEART RATE: 73 BPM | DIASTOLIC BLOOD PRESSURE: 74 MMHG | SYSTOLIC BLOOD PRESSURE: 111 MMHG | BODY MASS INDEX: 30.87 KG/M2

## 2024-04-17 DIAGNOSIS — C08.9 MUCOEPIDERMOID CARCINOMA OF SALIVARY GLAND: Primary | ICD-10-CM

## 2024-04-17 PROCEDURE — 3008F BODY MASS INDEX DOCD: CPT | Mod: CPTII,S$GLB,, | Performed by: OTOLARYNGOLOGY

## 2024-04-17 PROCEDURE — 3074F SYST BP LT 130 MM HG: CPT | Mod: CPTII,S$GLB,, | Performed by: OTOLARYNGOLOGY

## 2024-04-17 PROCEDURE — 99999 PR PBB SHADOW E&M-EST. PATIENT-LVL III: CPT | Mod: PBBFAC,,, | Performed by: OTOLARYNGOLOGY

## 2024-04-17 PROCEDURE — 1159F MED LIST DOCD IN RCRD: CPT | Mod: CPTII,S$GLB,, | Performed by: OTOLARYNGOLOGY

## 2024-04-17 PROCEDURE — 3078F DIAST BP <80 MM HG: CPT | Mod: CPTII,S$GLB,, | Performed by: OTOLARYNGOLOGY

## 2024-04-17 PROCEDURE — 99214 OFFICE O/P EST MOD 30 MIN: CPT | Mod: S$GLB,,, | Performed by: OTOLARYNGOLOGY

## 2024-04-17 NOTE — PROGRESS NOTES
Subjective:       Patient ID: Yoana Thomas is a 29 y.o. female.    Oncology History   Mucoepidermoid carcinoma of salivary gland   3/21/2022 Surgery    EXCISION, PAROTID GLAND-nerve monitoring (Left)  CREATION, FLAP, MUSCLE ROTATION-SCM (Left)     4/4/2022 Initial Diagnosis    Mucoepidermoid carcinoma of salivary gland     4/4/2022 Cancer Staged    Staging form: Major Salivary Glands, AJCC 8th Edition  - Pathologic stage from 4/4/2022: Stage III (pT2, pN1, cM0)      Radiation Therapy    Treating physician: Alli Blanco  Total Dose: 60 Gy  Fractions: 30    Treatment Summary  Course: C1 H&N 2022  Treatment Site Ref. ID Energy Dose/Fx (Gy) #Fx Dose Correction (Gy) Total Dose (Gy) Start Date End Date Elapsed Days   [facility] / 30 0 60 6/6/2022 7/18/2022 42            Chief Complaint: post op  HPI     Yoana Thomas returns for surveillance. Completed XRT 7/18/22. Doing well. No issues with scar.  No new neck masses.  Feels that she's able to take care of her health better with her new job, eating healthy, exercising regularlly.        Past Medical History:   Diagnosis Date    Anxiety     Attention deficit hyperactivity disorder (ADHD), predominantly inattentive type 06/14/2021    Hx of psychiatric care     Therapy        Past Surgical History:   Procedure Laterality Date    CREATION OF MUSCLE ROTATIONAL FLAP Left 3/21/2022    Procedure: CREATION, FLAP, MUSCLE ROTATION-SCM;  Surgeon: Edel Hall MD;  Location: [facility] OR [address] [place] [address];  Service: ENT;  Laterality: Left;    DISSECTION OF NECK Left 4/20/2022    Procedure: DISSECTION, NECK;  Surgeon: Edel Hall MD;  Location: [facility] OR [address] [place] [address];  Service: ENT;  Laterality: Left;    EXCISION OF PAROTID GLAND Left 3/21/2022    Procedure: EXCISION, PAROTID GLAND-nerve monitoring;  Surgeon: Edel Hall MD;  Location: [facility] OR [address] [place] [address];  Service: ENT;  Laterality: Left;    KELOID EXCISION      Left ear and she also had radiation Tx     WISDOM TOOTH EXTRACTION           Current Outpatient Medications:     paroxetine (PAXIL) 40 MG tablet, TAKE 1 TABLET(40 MG) BY MOUTH EVERY DAY, Disp: 90 tablet, Rfl: 3    loratadine (CLARITIN) 10 mg tablet, Take 1 tablet (10 mg total) by mouth once daily. (Patient not taking: Reported on 9/25/2023), Disp: 30 tablet, Rfl: 2    Review of patient's allergies indicates:  No Known Allergies    Social History     Socioeconomic History    Marital status: Single   Tobacco Use    Smoking status: Never     Passive exposure: Never    Smokeless tobacco: Never   Substance and Sexual Activity    Alcohol use: Yes     Comment: once every couple mos.     Drug use: Never    Sexual activity: Not Currently     Partners: Male     Birth control/protection: Condom   Social History Narrative    Works for [employer] ([employer])    Originally from [place]       Family History   Problem Relation Name Age of Onset    Skin cancer Father      Hypertension Father      Infertility Sister x2     Diabetes type II Maternal Grandfather      Diabetes type I Maternal Grandmother      Heart disease Maternal Grandmother      Kidney failure Maternal Grandmother          ESRD    Lymphoma Paternal Grandfather         Review of Systems   Constitutional: Negative.    HENT: Negative.    Eyes: Negative.    Respiratory: Negative.    Cardiovascular: Negative.    Gastrointestinal: Negative.    Endocrine: Negative.    Genitourinary: Negative.    Musculoskeletal: Negative.    Integumentary:  Negative.   Allergic/Immunologic: Negative.    Neurological: Negative.    Hematological: Negative.    Psychiatric/Behavioral: Negative.          Objective:      Physical Exam  Constitutional:       Appearance: Normal appearance.   HENT:      Head: Normocephalic and atraumatic.      Right Ear: External ear normal.      Left Ear: External ear normal.   Neck:      Comments: Modified Jose and neck dissection incision CDI.  No redness, drainage, or fluid collection noted. No  parotid or neck mass to palpation. No evidence of keloid.  Pulmonary:      Effort: Pulmonary effort is normal. No respiratory distress.   Neurological:      General: No focal deficit present.      Mental Status: She is alert.   Psychiatric:         Mood and Affect: Mood normal.         Behavior: Behavior normal.         Thought Content: Thought content normal.     CT Neck 4/20/23:  Postoperative changes of left parotidectomy and neck dissection for mucoid epidermoid carcinoma.  No new enhancement or nodular growth to suggest recurrent disease.  No evidence of metastatic disease.        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 Completed postoperative XRT 7/18/22. Doing well, HORACIO.  Return to clinic in 3 months for surveillance or sooner as needed.

## 2024-07-17 ENCOUNTER — OFFICE VISIT (OUTPATIENT)
Dept: OTOLARYNGOLOGY | Facility: CLINIC | Age: 29
End: 2024-07-17
Payer: COMMERCIAL

## 2024-07-17 DIAGNOSIS — C08.9 MUCOEPIDERMOID CARCINOMA OF SALIVARY GLAND: Primary | ICD-10-CM

## 2024-07-17 PROCEDURE — 99999 PR PBB SHADOW E&M-EST. PATIENT-LVL II: CPT | Mod: PBBFAC,,, | Performed by: OTOLARYNGOLOGY

## 2024-07-17 PROCEDURE — 1159F MED LIST DOCD IN RCRD: CPT | Mod: CPTII,S$GLB,, | Performed by: OTOLARYNGOLOGY

## 2024-07-17 PROCEDURE — 99214 OFFICE O/P EST MOD 30 MIN: CPT | Mod: S$GLB,,, | Performed by: OTOLARYNGOLOGY

## 2024-07-17 NOTE — PROGRESS NOTES
Subjective:       Patient ID: Yoana Thomas is a 29 y.o. female.    Oncology History   Mucoepidermoid carcinoma of salivary gland   3/21/2022 Surgery    EXCISION, PAROTID GLAND-nerve monitoring (Left)  CREATION, FLAP, MUSCLE ROTATION-SCM (Left)     4/4/2022 Initial Diagnosis    Mucoepidermoid carcinoma of salivary gland     4/4/2022 Cancer Staged    Staging form: Major Salivary Glands, AJCC 8th Edition  - Pathologic stage from 4/4/2022: Stage III (pT2, pN1, cM0)      Radiation Therapy    Treating physician: Alli Blanco  Total Dose: 60 Gy  Fractions: 30    Treatment Summary  Course: C1 H&N 2022  Treatment Site Ref. ID Energy Dose/Fx (Gy) #Fx Dose Correction (Gy) Total Dose (Gy) Start Date End Date Elapsed Days   [facility] / 30 0 60 6/6/2022 7/18/2022 42            Chief Complaint: post op  HPI     Yoana Thomas returns for surveillance. Completed XRT 7/18/22. Doing well. No issues with scar.  No new neck masses.  Feels that she's able to take care of her health better with her new job, eating healthy, exercising regularlly. Some soreness when touching her scar.         Past Medical History:   Diagnosis Date    Anxiety     Attention deficit hyperactivity disorder (ADHD), predominantly inattentive type 06/14/2021    Hx of psychiatric care     Therapy        Past Surgical History:   Procedure Laterality Date    CREATION OF MUSCLE ROTATIONAL FLAP Left 3/21/2022    Procedure: CREATION, FLAP, MUSCLE ROTATION-SCM;  Surgeon: Edel Hall MD;  Location: [facility] OR [address] [place] [address];  Service: ENT;  Laterality: Left;    DISSECTION OF NECK Left 4/20/2022    Procedure: DISSECTION, NECK;  Surgeon: Edel Hall MD;  Location: [facility] OR [address] [place] [address];  Service: ENT;  Laterality: Left;    EXCISION OF PAROTID GLAND Left 3/21/2022    Procedure: EXCISION, PAROTID GLAND-nerve monitoring;  Surgeon: Edel Hall MD;  Location: [facility] OR [address] [place] [address];  Service: ENT;  Laterality: Left;    KELOID EXCISION       Left ear and she also had radiation Tx    WISDOM TOOTH EXTRACTION           Current Outpatient Medications:     loratadine (CLARITIN) 10 mg tablet, Take 1 tablet (10 mg total) by mouth once daily. (Patient not taking: Reported on 9/25/2023), Disp: 30 tablet, Rfl: 2    paroxetine (PAXIL) 40 MG tablet, TAKE 1 TABLET(40 MG) BY MOUTH EVERY DAY, Disp: 90 tablet, Rfl: 3    Review of patient's allergies indicates:  No Known Allergies    Social History     Socioeconomic History    Marital status: Single   Tobacco Use    Smoking status: Never     Passive exposure: Never    Smokeless tobacco: Never   Substance and Sexual Activity    Alcohol use: Yes     Comment: once every couple mos.     Drug use: Never    Sexual activity: Not Currently     Partners: Male     Birth control/protection: Condom   Social History Narrative    Works for [employer] ([employer])    Originally from [place]       Family History   Problem Relation Name Age of Onset    Skin cancer Father      Hypertension Father      Infertility Sister x2     Diabetes type II Maternal Grandfather      Diabetes type I Maternal Grandmother      Heart disease Maternal Grandmother      Kidney failure Maternal Grandmother          ESRD    Lymphoma Paternal Grandfather         Review of Systems   Constitutional: Negative.    HENT: Negative.    Eyes: Negative.    Respiratory: Negative.    Cardiovascular: Negative.    Gastrointestinal: Negative.    Endocrine: Negative.    Genitourinary: Negative.    Musculoskeletal: Negative.    Integumentary:  Negative.   Allergic/Immunologic: Negative.    Neurological: Negative.    Hematological: Negative.    Psychiatric/Behavioral: Negative.          Objective:      Physical Exam  Constitutional:       Appearance: Normal appearance.   HENT:      Head: Normocephalic and atraumatic.      Right Ear: External ear normal.      Left Ear: External ear normal.   Neck:      Comments: Modified Jose and neck dissection incision CDI.  No  redness, drainage, or fluid collection noted. No parotid or neck mass to palpation. No evidence of keloid.  Pulmonary:      Effort: Pulmonary effort is normal. No respiratory distress.   Neurological:      General: No focal deficit present.      Mental Status: She is alert.   Psychiatric:         Mood and Affect: Mood normal.         Behavior: Behavior normal.         Thought Content: Thought content normal.     CT Neck 4/20/23:  Postoperative changes of left parotidectomy and neck dissection for mucoid epidermoid carcinoma.  No new enhancement or nodular growth to suggest recurrent disease.  No evidence of metastatic disease.        Assessment:       Problem List Items Addressed This Visit          Oncology    Mucoepidermoid carcinoma of salivary gland - Primary                           Plan:       Problem List Items Addressed This Visit          Oncology    Mucoepidermoid carcinoma of salivary gland - Primary                         T2N1M0 mucoepidermoid carcinoma, intermediate grade, of the left parotid. Completed postoperative XRT 7/18/22. Doing well, HORACIO.  Return to clinic in 4 months for surveillance or sooner as needed.

## 2024-07-25 ENCOUNTER — NURSE TRIAGE (OUTPATIENT)
Dept: ADMINISTRATIVE | Facility: CLINIC | Age: 29
End: 2024-07-25
Payer: COMMERCIAL

## 2024-07-25 ENCOUNTER — OFFICE VISIT (OUTPATIENT)
Dept: URGENT CARE | Facility: CLINIC | Age: 29
End: 2024-07-25
Payer: COMMERCIAL

## 2024-07-25 ENCOUNTER — TELEPHONE (OUTPATIENT)
Dept: PRIMARY CARE CLINIC | Facility: CLINIC | Age: 29
End: 2024-07-25
Payer: COMMERCIAL

## 2024-07-25 VITALS
RESPIRATION RATE: 14 BRPM | TEMPERATURE: 98 F | BODY MASS INDEX: 28.52 KG/M2 | HEIGHT: 62 IN | SYSTOLIC BLOOD PRESSURE: 120 MMHG | OXYGEN SATURATION: 99 % | WEIGHT: 155 LBS | HEART RATE: 73 BPM | DIASTOLIC BLOOD PRESSURE: 83 MMHG

## 2024-07-25 DIAGNOSIS — Z11.59 SCREENING FOR VIRAL DISEASE: ICD-10-CM

## 2024-07-25 DIAGNOSIS — U07.1 COVID: Primary | ICD-10-CM

## 2024-07-25 LAB
CTP QC/QA: YES
SARS-COV-2 AG RESP QL IA.RAPID: POSITIVE

## 2024-07-25 PROCEDURE — 87811 SARS-COV-2 COVID19 W/OPTIC: CPT | Mod: QW,S$GLB,, | Performed by: FAMILY MEDICINE

## 2024-07-25 PROCEDURE — 99213 OFFICE O/P EST LOW 20 MIN: CPT | Mod: S$GLB,,, | Performed by: FAMILY MEDICINE

## 2024-07-25 RX ORDER — PROMETHAZINE HYDROCHLORIDE AND DEXTROMETHORPHAN HYDROBROMIDE 6.25; 15 MG/5ML; MG/5ML
5 SYRUP ORAL EVERY 8 HOURS PRN
Qty: 180 ML | Refills: 0 | Status: SHIPPED | OUTPATIENT
Start: 2024-07-25 | End: 2024-08-04

## 2024-07-25 NOTE — TELEPHONE ENCOUNTER
----- Message from Day Anderson sent at 7/25/2024 10:25 AM CDT -----  Contact: [phone]  Good morning,     Pt called to share she tested POSITIVE for COVID, and in nicole of advice from your clinical staff.  Pt is requesting a call back, concerned that she may need prescribed medication   Pt was transferred to [facility] in need of what she can or should do at this time, while quarantining.    Thank you,   Lorena Camarena Navigator

## 2024-07-25 NOTE — TELEPHONE ENCOUNTER
Per triage nurse's note/message from transfer of this call. Pt was adv to to to ED and agreed to do so

## 2024-07-25 NOTE — PROGRESS NOTES
"Subjective:      Patient ID: Yoana Thomas is a 29 y.o. female.    Vitals:  height is 5' 2" (1.575 m) and weight is 70.3 kg (155 lb). Her temperature is 98.1 °F (36.7 °C). Her blood pressure is 120/83 and her pulse is 73. Her respiration is 14 and oxygen saturation is 99%.     Chief Complaint: COVID-19 Concerns    29 year old female presents today with COVID Concerns. Home test was positive on 07/22/2024 and wants to retest. Symptoms started 07/21/2024. Treatments at home includes Vicks night time and Advil with mild relief. Sweats, chills, congestion, sinuses, sore throat, cough, HA.     Sinus Problem  Episode onset: 07/21/2024. Maximum temperature: 99. Associated symptoms include chills, congestion, coughing, diaphoresis, headaches, neck pain, sinus pressure and a sore throat. Past treatments include oral decongestants.       Constitution: Positive for chills and sweating.   HENT:  Positive for congestion, sinus pressure and sore throat.    Neck: Positive for neck pain.   Respiratory:  Positive for cough.    Neurological:  Positive for headaches.      Objective:     Physical Exam   Constitutional: She is oriented to person, place, and time. She appears well-developed. She is cooperative.  Non-toxic appearance. She does not appear ill. No distress.   HENT:   Head: Normocephalic and atraumatic.   Ears:   Right Ear: Hearing, tympanic membrane and external ear normal.   Left Ear: Hearing, tympanic membrane and external ear normal.   Nose: Nose normal. No mucosal edema, rhinorrhea or nasal deformity. No epistaxis. Right sinus exhibits no maxillary sinus tenderness and no frontal sinus tenderness. Left sinus exhibits no maxillary sinus tenderness and no frontal sinus tenderness.   Mouth/Throat: Uvula is midline, oropharynx is clear and moist and mucous membranes are normal. No trismus in the jaw. Normal dentition. No uvula swelling. No oropharyngeal exudate, posterior oropharyngeal edema or posterior " oropharyngeal erythema.   Eyes: Conjunctivae and lids are normal. No scleral icterus.   Neck: Trachea normal and phonation normal. Neck supple. No edema present. No erythema present. No neck rigidity present.   Cardiovascular: Normal rate, regular rhythm, normal heart sounds and normal pulses.   Pulmonary/Chest: Effort normal and breath sounds normal. No respiratory distress. She has no decreased breath sounds. She has no rhonchi.   Abdominal: Normal appearance.   Musculoskeletal: Normal range of motion.         General: No deformity. Normal range of motion.   Neurological: She is alert and oriented to person, place, and time. She exhibits normal muscle tone. Coordination normal.   Skin: Skin is warm, dry, intact, not diaphoretic and not pale.   Psychiatric: Her speech is normal and behavior is normal. Judgment and thought content normal.   Nursing note and vitals reviewed.    Results for orders placed or performed in visit on 07/25/24   SARS Coronavirus 2 Antigen, POCT Manual Read   Result Value Ref Range    SARS Coronavirus 2 Antigen Positive (A) Negative     Acceptable Yes          Assessment:     1. COVID    2. Screening for viral disease        Plan:       COVID  -     promethazine-dextromethorphan (PROMETHAZINE-DM) 6.25-15 mg/5 mL Syrp; Take 5 mLs by mouth every 8 (eight) hours as needed.  Dispense: 180 mL; Refill: 0    Screening for viral disease  -     SARS Coronavirus 2 Antigen, POCT Manual Read        Thank you for choosing [facility]!     Our goal in the Urgent Care is to always provide outstanding medical care. You may receive a survey by mail or e-mail in the next week regarding your experience today. We would greatly appreciate you completing and returning the survey. Your feedback provides us with a way to recognize our staff who provide very good care, and it helps us learn how to improve when your experience was below our aspiration of excellence.       We appreciate you  trusting us with your medical care. We hope you feel better soon. We will be happy to take care of you for all of your future medical needs.  You must understand that you've received an Urgent Care treatment only and that you may be released before all your medical problems are known or treated. You, the patient, will arrange for follow up care as instructed.  Follow up with your PCP or specialty clinic as directed in the next 1-2 weeks if not improved or as needed.  You can call [phone] to schedule an appointment with the appropriate provider.  Another option is to follow up with [facility] ([email or web address]) virtually for quick simple medical advice.  If your condition worsens we recommend that you receive another evaluation at the emergency room immediately or contact your primary medical clinics after hours call service to discuss your concerns.  Please return here or go to the Emergency Department for any concerns or worsening of condition.      *If you were prescribed a narcotic or controlled medication, do not drive or operate heavy equipment or machinery while taking these medications.

## 2024-07-25 NOTE — TELEPHONE ENCOUNTER
Pt test positive for covid at home on Friday reports no improvement since Monday. Per protocol instructed pt to go to ED now.   Reason for Disposition   MODERATE difficulty breathing (e.g., speaks in phrases, SOB even at rest, pulse 100-120)    Additional Information   Negative: SEVERE difficulty breathing (e.g., struggling for each breath, speaks in single words)   Negative: Difficult to awaken or acting confused (e.g., disoriented, slurred speech)   Negative: Bluish (or gray) lips or face now   Negative: Shock suspected (e.g., cold/pale/clammy skin, too weak to stand, low BP, rapid pulse)   Negative: Sounds like a life-threatening emergency to the triager   Negative: SEVERE or constant chest pain or pressure  (Exception: Mild central chest pain, present only when coughing.)    Protocols used: Coronavirus (COVID-19) Diagnosed or [id number]

## 2024-09-30 ENCOUNTER — PATIENT MESSAGE (OUTPATIENT)
Dept: PRIMARY CARE CLINIC | Facility: CLINIC | Age: 29
End: 2024-09-30
Payer: COMMERCIAL

## 2024-10-20 NOTE — PROGRESS NOTES
REFERRING PHYSICIAN:  Aly Blanco M.D., Radiationl Oncologist  REASON FOR REFERRAL: Dysphagia  HISTORY OF PRESENT ILLNESS:  Yoana Thomas, age 27 y.o., was referred by Dr. Blanco for pre-RT assessment and counseling in anticipation of treatment of T2N1M0, group stage III, mucoepidermoid carcinoma of the left parotid s/p left parotid excision (3/21/22) and left neck dissection (4/20/22). She denies any difficulty swallowing, no trismus, no pain. She is employed in [employer], feels very well supported at work. Employees are provided food at work, they have already said they will accommodate with smoothies and soup as necessary into her treatment. She states L facial weakness has essentially resolved, no longer biting lip. She still notices her L eyeblink is off in pictures. She was seen by dentist today, cleared for radiation and given sensitive fluoride toothpaste and rec for Biotene mouthwash. Medication and problem lists were reviewed.     Patient discussed at Tumor Board on 5/13/22, with recommendation for adjuvant RT    Oncology History   Mucoepidermoid carcinoma of salivary gland   3/21/2022 Surgery    EXCISION, PAROTID GLAND-nerve monitoring (Left)  CREATION, FLAP, MUSCLE ROTATION-SCM (Left)     4/4/2022 Initial Diagnosis    Mucoepidermoid carcinoma of salivary gland     4/4/2022 Cancer Staged    Staging form: Major Salivary Glands, AJCC 8th Edition  - Pathologic stage from 4/4/2022: Stage III (pT2, pN1, cM0)         PERTINENT MEDICAL HISTORY:  Past Medical History:   Diagnosis Date    Anxiety     Attention deficit hyperactivity disorder (ADHD), predominantly inattentive type 6/14/2021       SURGICAL HISTORY:  Past Surgical History:   Procedure Laterality Date    CREATION OF MUSCLE ROTATIONAL FLAP Left 3/21/2022    Procedure: CREATION, FLAP, MUSCLE ROTATION-SCM;  Surgeon: Edel Hall MD;  Location: [facility] OR [address] [place] [address];  Service: ENT;  Laterality: Left;    DISSECTION OF NECK  Left 2022    Procedure: DISSECTION, NECK;  Surgeon: Edel Hall MD;  Location: [facility] OR 2ND FLR;  Service: ENT;  Laterality: Left;    EXCISION OF PAROTID GLAND Left 3/21/2022    Procedure: EXCISION, PAROTID GLAND-nerve monitoring;  Surgeon: Edel Hall MD;  Location: [facility] OR 2ND FLR;  Service: ENT;  Laterality: Left;    KELOID EXCISION      Left ear and she also had radiation Tx    WISDOM TOOTH EXTRACTION         IMAGIN22 PET  Impression:   No definite evidence of hypermetabolic tumor.  Mild increased uptake in the region of the left parotid gland adjacent to and surrounding surgical clips without lesion on CT correlate.  Finding may represent residual inflammatory changes from recent surgery.  Recommend correlation with surgical margin status.   No hypermetabolic lymphadenopathy.    3/4/22 CT Soft Tissue Neck  Impression:   Nonspecific irregular mixed cystic and solid  superficial left parotid mass.  The lesion enhances prominently and may be somewhat vascular in nature.  Adjacent prominent intraparotid lymph nodes suggested.  Borderline in size nonspecific left upper jugular chain lymph node with smaller clustered adjacent nodes.    SWALLOW HISTORY:  No prior swallow difficulty.    FAMILY HISTORY:  Family History   Problem Relation Age of Onset    Skin cancer Father     Hypertension Father     Infertility Sister     Diabetes type I Maternal Grandmother     Heart disease Maternal Grandmother     Kidney failure Maternal Grandmother         ESRD    Diabetes type II Maternal Grandfather     Lung cancer Paternal Grandfather        Tobacco use:  Non smoker  ETOH use:  Yes    Clinical Evaluation/Treatment: Yoana Thomas participated in discussion of normal swallow function, possible effects of RT on swalloShe was fully cooperative for the assessment.     HEARING:  Subjectively, within normal limits.    Oral Mechanism Examination:   Oral opening: WNL   Lips:  WNL for rounding,  spreading, lip closure   Tongue:  WNL for protrusion, lateralization, elevation, retroflexion.     Reflexes:  Gag: WNL   Dentition:  WNL  Oral mucosa: moist    Speech:  100% intelligible with no distortions.  DDK  Voice/Resonance: WNL    Swallow:    Oral: WNL  Pharyngeal: WNL  Esophageal: clinically judged to be WNL    Functional Oral Intake Scale:   7 - Total oral intake with no restrictions    Eating Assessment Tool (EAT-10)  0 = No Problem 4 = Severe Problem   My swallowing problem has caused me to lose weight. 0 1 2 3 4   My swallowing problem interferes with my ability to go out for meals. 0 1 2 3 4   Swallowing liquids takes extra effort. 0 1 2 3 4   Swallowing solids takes extra effort. 0 1 2 3 4   Swallowing pills takes extra effort. 0 1 2 3 4   Swallowing is painful. 0 1 2 3 4   The pleasure of eating is affected by my swallowing. 0 1 2 3 4   When I swallow food sticks in my throat. 0 1 2 3 4   I cough when I eat. 0 1 2 3 4   Swallowing is stressful. 0 1 2 3 4   Score: 0  EAT-10 score of 3 or higher may indicate problems swallowing efficiently and safely.     COUNSELING:  Yoana Thomas participated in discussion of normal swallow function vs disordered, possible effects of RT, and therapeutic interventions. Following discussion of normal swallow, pt relayed good understanding of basic swallow mechanics. Pt was educated in changes to swallow function during RT to include dry mouth which will contribute to dysphagia, mucositis, loss of taste, pain with swallow, changes in oral intake to possibly include softer foods and added moisture, possible pain and desquamation of skin to neck. Pt urged to practice frequent oral care with mouthwash and brushing teeth and tongue, particularly following meals and any items high in sugar, increase daily hydration with frequent sips of water.     Discussed the goal of maintaining functional swallow throughout  treatment for the best possible outcome. Discussed that patient  may need to alter food consistencies (e.g. added moisture, supplementing with protein powders in blended drinks, etc) all of which are acceptable with the goal of continuing to swallow throughout the treatment and beyond. Advised that many patients begin to experience swallow difficulties half-way through RT.  Should patient begin to exhibit any swallowing problems such as foods/pills sticking, signs/symptoms of aspiration, she will contact this clinician.  Extensive discussion given to importance of swallow exercises in effort to prevent or limit swallowing function problems and disuse atrophy during or after RT, encouraged patient to continue with exercises and oral care even if not experiencing difficulties.    Reviewed the following possible side effects related to RT:  Xerostomia:   Provided written resources for OTC and homemade (baking soda rinses) treatments for dry mouth.    Irritation:  Discussed that the treatment can cause irritation to the tissues plus certain tastes and textures may be found to be irritating as well. Described mucositis and advised that should it occur during XRT, the Radiation Oncologist would prescribe appropriate treatment.  Changes in taste: Discussed this possibility and the resource of Nutrition to assist if this is contributing to decreased oral intake.  Radiation fibrosis: Described this effect and the possible need for continued use of swallowing exercises for an extended time (including lifelong) after completion of XRT to keep the structures involved in swallowing functioning at their maximum potential for the safest and most efficient swallow, as well as myofascial release strategies to maintain mobility of structures.    Lymphedema:  Discussed possible emergence following surgery and/or XRT which can be addressed therapeutically.        IMPRESSION: Yoana Thomas presents with oropharyngeal swallow WNL with known dx T2N1M0, group stage III, mucoepidermoid carcinoma of the  left parotid s/p left parotid excision (3/21/22) and left neck dissection (4/20/22). Patient will continue with regualr diet. She understands anticipated changes in swallow with RT. She will practice daily oral care as trained. This clinician will f/u with pt -3 weeks into RT, or sooner should he have any decline and at conclusion of RT when returning to see Dr Hood. She is in agreement with this poc.    PLAN OF CARE:  1.  Continuation of current regular consistency diet with thin liquids using trained strategies of effortful swallow, follow aspiration precautions, including, but not limited to monitoring for any signs/symptoms of aspiration (such as wet/gurgly voice that does not clear with coughing, inability to make any voice sounds, any persistent coughing with oral intake, otherwise unexplained fever, unexplained increased or new difficulty or discomfort breathing, unexplained increase in  sleepiness/lethargy/significant fatigue, unexplained increase or new onset confusion or change in cognitive functioning, or any other unexplained change in health or well-being that could be related to swallowing).  2.  Change diet consistency as necessary to facilitate safe and efficient swallow, including added moisture to foods, softer foods.  3.  Begin swallow exercises now and continue 3-4x/day through and post treatment.  4.  Practice 3-5x daily oral hygiene with dry mouth products, teeth brushing, steam, hydration, nasal saline - several OTC products were discussed and written.  5.  Consider audiologic evaluation if a potentially ototoxic chemotherapy agent will be used.  6.  Monitor weight and hydration.  7.  Return to my clinic at mid-point of radiation and again 1 month after completion of treatment in conjunction with return to Dr. Hall's clinic.  8. Consider Medihoney as mouth rinse daily.    Long-term goals:  Yoana Thomas will continue with oral intake of regular or slightly modified diet with minimal  to no sign/symptoms of dysphagia while maintaining weight and hydration needs.    Short-term objectives:  Pt leaves with home program to include:  1. Oral care program with steam, gargle, use of dry mouth products in effort to reduce oral dryness and reduce effects of mucositis  2. Practice daily swallow exercises to maintain swallow function through and post RT    Tobacco use: Pt is not a tobacco user.        Oriented - self; Oriented - place; Oriented - time/Age appropriate behavior

## 2024-11-18 ENCOUNTER — PATIENT MESSAGE (OUTPATIENT)
Dept: PRIMARY CARE CLINIC | Facility: CLINIC | Age: 29
End: 2024-11-18
Payer: COMMERCIAL

## 2024-11-19 ENCOUNTER — LAB VISIT (OUTPATIENT)
Dept: LAB | Facility: HOSPITAL | Age: 29
End: 2024-11-19
Attending: INTERNAL MEDICINE
Payer: COMMERCIAL

## 2024-11-19 DIAGNOSIS — Z13.220 SCREENING FOR LIPOID DISORDERS: ICD-10-CM

## 2024-11-19 DIAGNOSIS — Z00.00 NORMAL PHYSICAL EXAM, ROUTINE: ICD-10-CM

## 2024-11-19 LAB
ALBUMIN SERPL BCP-MCNC: 3.6 G/DL (ref 3.5–5.2)
ALP SERPL-CCNC: 63 U/L (ref 40–150)
ALT SERPL W/O P-5'-P-CCNC: 15 U/L (ref 10–44)
ANION GAP SERPL CALC-SCNC: 10 MMOL/L (ref 8–16)
AST SERPL-CCNC: 16 U/L (ref 10–40)
BASOPHILS # BLD AUTO: 0.03 K/UL (ref 0–0.2)
BASOPHILS NFR BLD: 0.6 % (ref 0–1.9)
BILIRUB SERPL-MCNC: 0.4 MG/DL (ref 0.1–1)
BUN SERPL-MCNC: 13 MG/DL (ref 6–20)
CALCIUM SERPL-MCNC: 8.9 MG/DL (ref 8.7–10.5)
CHLORIDE SERPL-SCNC: 105 MMOL/L (ref 95–110)
CHOLEST SERPL-MCNC: 196 MG/DL (ref 120–199)
CHOLEST/HDLC SERPL: 3.5 {RATIO} (ref 2–5)
CO2 SERPL-SCNC: 23 MMOL/L (ref 23–29)
CREAT SERPL-MCNC: 0.7 MG/DL (ref 0.5–1.4)
DIFFERENTIAL METHOD BLD: ABNORMAL
EOSINOPHIL # BLD AUTO: 0.1 K/UL (ref 0–0.5)
EOSINOPHIL NFR BLD: 1.7 % (ref 0–8)
ERYTHROCYTE [DISTWIDTH] IN BLOOD BY AUTOMATED COUNT: 12 % (ref 11.5–14.5)
EST. GFR  (NO RACE VARIABLE): >60 ML/MIN/1.73 M^2
ESTIMATED AVG GLUCOSE: 100 MG/DL (ref 68–131)
GLUCOSE SERPL-MCNC: 95 MG/DL (ref 70–110)
HBA1C MFR BLD: 5.1 % (ref 4–5.6)
HCT VFR BLD AUTO: 42.4 % (ref 37–48.5)
HDLC SERPL-MCNC: 56 MG/DL (ref 40–75)
HDLC SERPL: 28.6 % (ref 20–50)
HGB BLD-MCNC: 14 G/DL (ref 12–16)
IMM GRANULOCYTES # BLD AUTO: 0.04 K/UL (ref 0–0.04)
IMM GRANULOCYTES NFR BLD AUTO: 0.8 % (ref 0–0.5)
LDLC SERPL CALC-MCNC: 120.2 MG/DL (ref 63–159)
LYMPHOCYTES # BLD AUTO: 1 K/UL (ref 1–4.8)
LYMPHOCYTES NFR BLD: 20.8 % (ref 18–48)
MCH RBC QN AUTO: 29.3 PG (ref 27–31)
MCHC RBC AUTO-ENTMCNC: 33 G/DL (ref 32–36)
MCV RBC AUTO: 89 FL (ref 82–98)
MONOCYTES # BLD AUTO: 0.5 K/UL (ref 0.3–1)
MONOCYTES NFR BLD: 10.7 % (ref 4–15)
NEUTROPHILS # BLD AUTO: 3.1 K/UL (ref 1.8–7.7)
NEUTROPHILS NFR BLD: 65.4 % (ref 38–73)
NONHDLC SERPL-MCNC: 140 MG/DL
NRBC BLD-RTO: 0 /100 WBC
PLATELET # BLD AUTO: 257 K/UL (ref 150–450)
PMV BLD AUTO: 10.4 FL (ref 9.2–12.9)
POTASSIUM SERPL-SCNC: 4.4 MMOL/L (ref 3.5–5.1)
PROT SERPL-MCNC: 6.6 G/DL (ref 6–8.4)
RBC # BLD AUTO: 4.78 M/UL (ref 4–5.4)
SODIUM SERPL-SCNC: 138 MMOL/L (ref 136–145)
TRIGL SERPL-MCNC: 99 MG/DL (ref 30–150)
TSH SERPL DL<=0.005 MIU/L-ACNC: 2.63 UIU/ML (ref 0.4–4)
WBC # BLD AUTO: 4.75 K/UL (ref 3.9–12.7)

## 2024-11-19 PROCEDURE — 36415 COLL VENOUS BLD VENIPUNCTURE: CPT | Performed by: INTERNAL MEDICINE

## 2024-11-19 PROCEDURE — 83036 HEMOGLOBIN GLYCOSYLATED A1C: CPT | Performed by: INTERNAL MEDICINE

## 2024-11-19 PROCEDURE — 80053 COMPREHEN METABOLIC PANEL: CPT | Performed by: INTERNAL MEDICINE

## 2024-11-19 PROCEDURE — 80061 LIPID PANEL: CPT | Performed by: INTERNAL MEDICINE

## 2024-11-19 PROCEDURE — 85025 COMPLETE CBC W/AUTO DIFF WBC: CPT | Performed by: INTERNAL MEDICINE

## 2024-11-19 PROCEDURE — 84443 ASSAY THYROID STIM HORMONE: CPT | Performed by: INTERNAL MEDICINE

## 2024-11-20 NOTE — PROGRESS NOTES
I sent pt a my chart message -  I reviewed your labs.  Your Ha1c was normal at 5.1.  Your thyroid functions are normal.  Your Cholesterol looked good.  Your kidney function and liver functions looked good.  You are not anemic. No further recommendations at this time.  Dr. GRIFFITHS

## 2024-11-25 ENCOUNTER — OFFICE VISIT (OUTPATIENT)
Dept: OTOLARYNGOLOGY | Facility: CLINIC | Age: 29
End: 2024-11-25
Payer: COMMERCIAL

## 2024-11-25 VITALS
SYSTOLIC BLOOD PRESSURE: 114 MMHG | BODY MASS INDEX: 32.22 KG/M2 | DIASTOLIC BLOOD PRESSURE: 81 MMHG | WEIGHT: 176.13 LBS

## 2024-11-25 DIAGNOSIS — C08.9 MUCOEPIDERMOID CARCINOMA OF SALIVARY GLAND: Primary | ICD-10-CM

## 2024-11-25 PROCEDURE — 99999 PR PBB SHADOW E&M-EST. PATIENT-LVL II: CPT | Mod: PBBFAC,,, | Performed by: OTOLARYNGOLOGY

## 2024-11-25 PROCEDURE — 3008F BODY MASS INDEX DOCD: CPT | Mod: CPTII,S$GLB,, | Performed by: OTOLARYNGOLOGY

## 2024-11-25 PROCEDURE — 99213 OFFICE O/P EST LOW 20 MIN: CPT | Mod: S$GLB,,, | Performed by: OTOLARYNGOLOGY

## 2024-11-25 PROCEDURE — 3079F DIAST BP 80-89 MM HG: CPT | Mod: CPTII,S$GLB,, | Performed by: OTOLARYNGOLOGY

## 2024-11-25 PROCEDURE — 3044F HG A1C LEVEL LT 7.0%: CPT | Mod: CPTII,S$GLB,, | Performed by: OTOLARYNGOLOGY

## 2024-11-25 PROCEDURE — 1159F MED LIST DOCD IN RCRD: CPT | Mod: CPTII,S$GLB,, | Performed by: OTOLARYNGOLOGY

## 2024-11-25 PROCEDURE — 1160F RVW MEDS BY RX/DR IN RCRD: CPT | Mod: CPTII,S$GLB,, | Performed by: OTOLARYNGOLOGY

## 2024-11-25 PROCEDURE — 3074F SYST BP LT 130 MM HG: CPT | Mod: CPTII,S$GLB,, | Performed by: OTOLARYNGOLOGY

## 2024-11-25 NOTE — PROGRESS NOTES
CC: Surveillance    Oncology History   Mucoepidermoid carcinoma of salivary gland   3/21/2022 Surgery    EXCISION, PAROTID GLAND-nerve monitoring (Left)  CREATION, FLAP, MUSCLE ROTATION-SCM (Left)     4/4/2022 Initial Diagnosis    Mucoepidermoid carcinoma of salivary gland     4/4/2022 Cancer Staged    Staging form: Major Salivary Glands, AJCC 8th Edition  - Pathologic stage from 4/4/2022: Stage III (pT2, pN1, cM0)      Radiation Therapy    Treating physician: Alli Blanco  Total Dose: 60 Gy  Fractions: 30    Treatment Summary  Course: C1 H&N 2022  Treatment Site Ref. ID Energy Dose/Fx (Gy) #Fx Dose Correction (Gy) Total Dose (Gy) Start Date End Date Elapsed Days   IM HeadNe Head&Neck 6X 2 30 / 30 0 60 6/6/2022 7/18/2022 42              HPI   29 y.o. female presents with the above treatment history. No complaints.    Review of Systems   Constitutional: Negative for fatigue and unexpected weight change.   HENT: Per HPI.  Eyes: Negative for visual disturbance.   Respiratory: Negative for shortness of breath, hemoptysis   Cardiovascular: Negative for chest pain and palpitations.   Musculoskeletal: Negative for decreased ROM, back pain. :  Skin: Negative for rash, sunburn, itching.   Neurological: Negative for dizziness and seizures.   Hematological: Negative for adenopathy. Does not bruise/bleed easily.   Endocrine: Negative for rapid weight loss/weight gain, heat/cold intolerance.     Past Medical History   Patient Active Problem List   Diagnosis    Generalized anxiety disorder    Attention deficit hyperactivity disorder (ADHD), predominantly inattentive type    Mucoepidermoid carcinoma of salivary gland           Past Surgical History   Past Surgical History:   Procedure Laterality Date    CREATION OF MUSCLE ROTATIONAL FLAP Left 3/21/2022    Procedure: CREATION, FLAP, MUSCLE ROTATION-SCM;  Surgeon: Edel Hall MD;  Location: [facility] OR [address] [place] [address];  Service: ENT;  Laterality: Left;    DISSECTION OF NECK Left  4/20/2022    Procedure: DISSECTION, NECK;  Surgeon: Edel Hall MD;  Location: [facility] OR 2ND FLR;  Service: ENT;  Laterality: Left;    EXCISION OF PAROTID GLAND Left 3/21/2022    Procedure: EXCISION, PAROTID GLAND-nerve monitoring;  Surgeon: Edel Hall MD;  Location: [facility] OR 2ND FLR;  Service: ENT;  Laterality: Left;    KELOID EXCISION      Left ear and she also had radiation Tx    WISDOM TOOTH EXTRACTION           Family History   Family History   Problem Relation Name Age of Onset    Skin cancer Father      Hypertension Father      Infertility Sister x2     Diabetes type II Maternal Grandfather      Diabetes type I Maternal Grandmother      Heart disease Maternal Grandmother      Kidney failure Maternal Grandmother          ESRD    Lymphoma Paternal Grandfather             Social History   .  Social History     Socioeconomic History    Marital status: Single   Tobacco Use    Smoking status: Never     Passive exposure: Never    Smokeless tobacco: Never   Substance and Sexual Activity    Alcohol use: Yes     Comment: once every couple mos.     Drug use: Never    Sexual activity: Not Currently     Partners: Male     Birth control/protection: Condom   Social History Narrative    Works for [employer]    Originally from [place]         Allergies   Review of patient's allergies indicates:  No Known Allergies        Physical Exam     Vitals:    11/25/24 1141   BP: 114/81         Body mass index is 32.22 kg/m².      General: AOx3, NAD   Respiratory:  Symmetric chest rise, normal effort  Nose: No gross nasal septal deviation. Inferior Turbinates WNL bilaterally. No septal perforation. No masses/lesions.   Oral Cavity:  Oral Tongue mobile, no lesions noted. Hard Palate WNL. No buccal or FOM lesions.  Oropharynx:  No masses/lesions of the posterior pharyngeal wall. Tonsillar fossa without lesions. Soft palate without masses. Midline uvula.   Neck: Well-healed L neck scar. No cervical  lymphadenopathy, thyromegaly or thyroid nodules.  Normal range of motion.    Face: House Brackmann I bilaterally.     Assessment/Plan  Problem List Items Addressed This Visit          Oncology    Mucoepidermoid carcinoma of salivary gland - Primary     HORACIO.  RTC 4 mos. CT neck ordered.         Relevant Orders    CT Soft Tissue Neck With Contrast

## 2024-11-26 ENCOUNTER — HOSPITAL ENCOUNTER (OUTPATIENT)
Dept: RADIOLOGY | Facility: HOSPITAL | Age: 29
Discharge: HOME | End: 2024-11-26
Attending: OTOLARYNGOLOGY
Payer: COMMERCIAL

## 2024-11-26 DIAGNOSIS — C08.9 MUCOEPIDERMOID CARCINOMA OF SALIVARY GLAND: ICD-10-CM

## 2024-11-26 PROCEDURE — 25500020 PHARM REV CODE 255: Performed by: OTOLARYNGOLOGY

## 2024-11-26 PROCEDURE — 70491 CT SOFT TISSUE NECK W/DYE: CPT | Mod: TC

## 2024-11-26 PROCEDURE — 70491 CT SOFT TISSUE NECK W/DYE: CPT | Mod: 26,,, | Performed by: RADIOLOGY

## 2024-11-26 RX ADMIN — IOHEXOL 75 ML: 350 INJECTION, SOLUTION INTRAVENOUS at 09:11

## 2024-12-01 NOTE — PROGRESS NOTES
[facility] Note    Chief Complaint      Chief Complaint   Patient presents with    Annual Exam       History of Present Illness      Yoana Thomas is a 29 y.o.    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  Referred by Prev PCP, Dr. Heike Pimentel. 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 Castro 2/7/22. Fu by ENT, Dr. Hall. CT neck - 3/4/22. S/p FNA - consistent with parotid low-grade neoplasm. S/p parotidectomy 3/21/22 - Mucoepidermoid carcinoma of salivary gland. PET CT 4/8/22.  S/P Left Neck Dissection 4/20/22. Fu by Rad/Onc. S/p adjuvant radiation which she completed 7/18/22.  Fu by Speech Tx.   CTA neck 9/17/22 - Postoperative change of left parotid mucoepidermoid carcinoma resection.  Superficial thickening and heterogeneous enhancement of the gland which may be related to postoperative or radiation changes.  5 mm soft tissue density in the superficial left parotid that may represent an intraparotid lymph node, however attention on follow-up exams is recommended. Abscess drained 9/20/22  CT chest - 4/20/23 - No evidence of metastatic disease. Fu with Dr. Hall in Dec. and Dr. Blanco in Apr.  Thyroid functions are normal. Fu TSH Q 6 mos. Left paraspinal neck tightness. She will try stretching exercises at home. If persists rec consider PT. CT neck - 11/26/24. Remote operative change left parotidectomy and neck dissection. No evidence for enhancing mass or adenopathy to suggest definite worsening residual or recurrent disease. Grade 1 anterolisthesis of C2 on C3 similar to prior.      Borderline HLD -  She does not require medication for this at this time but I do recommend you she cont. a low cholesterol diet and exercise.  Improved. She limits red meat.      RT lung nodule - Seen on CT - 4/20/23 -Unchanged 0.4 cm right lower lobe perifissural nodule, likely a  lymph node.      Anxiety - Pt on Paxil 40 mg/d for the past 2-3 yrs. She is doing well on this.      ADHD - Pt not currently on meds for this.      Obesity - BMI - 33.1- down 4 lb since last visit.   She was 89 at beg. of pandemic.  We discussed pharmacotherapy. Given RadTx to the neck I would be hesitant to start Ozempic or mounjaro due to potential for inc risk of MTC. Plans to work on lifestyle modification. Could consider adipex, contrave, Qsymia. She  cut out red meat again which helped with wt loss in past. She plans to cut back on gluten because she feels better off of it.       Lab review: 1/26/21 HA1c - 5.5;  TSH - 2.76;   HDL 51 ; BUN/Cr - 12/0.77; H/H - 14.2/41.5     HCM - Flu - declines;  Tdap -  4/19/23; HPV vaccine - # 1 8/2/22 and # 2;   COVID - 19 Vaccine (Pfizer) #1 - 3/30/21; #2 - 4/23/21; #3 ; MGM - none;  DEXA - none;  PAP -8/2/22- neg ; Hep C Screen - 9/16/22 - neg.;  HIV Screen -  9/16/22 - neg.; C-scope - none; Prev PCP - Dr. Heike Pimentel; Prev OB/GYN - in [place]; well visit - 12/3/24     Patient Care Team:  Abbey Leyva MD as PCP - General (Internal Medicine)     Health Maintenance:  Immunization History   Administered Date(s) Administered    COVID-19 Vaccine 04/01/2021    COVID-19, MRNA, LN-S, PF (Pfizer) (Purple Cap) 03/30/2021, 04/23/2021    HPV 9-Valent 08/02/2022, 10/19/2022    Influenza - Quadrivalent 10/01/2020    Influenza - Quadrivalent - PF *Preferred* (6 months and older) 10/19/2022    Tdap 04/19/2023      Health Maintenance   Topic Date Due    Pneumococcal Vaccines (Age 0-64) (1 of 2 - PCV) Never done    Influenza Vaccine (1) 09/01/2024    COVID-19 Vaccine (4 - 2024-25 season) 09/01/2024    Pap Smear  08/02/2025    TETANUS VACCINE  04/19/2033    RSV Vaccine (Age 60+ and Pregnant patients) (1 - 1-dose 75+ series) 03/17/2070    Hepatitis C Screening  Completed    HIV Screening  Completed    Lipid Panel  Completed        Past Medical History:  Past Medical  History:   Diagnosis Date    Anxiety     Attention deficit hyperactivity disorder (ADHD), predominantly inattentive type 06/14/2021    COVID-19     7/2024    Hx of psychiatric care     Therapy        Past Surgical History:   has a past surgical history that includes Keloid excision; Stanton tooth extraction; Excision of parotid gland (Left, 3/21/2022); Creation of muscle rotational flap (Left, 3/21/2022); and Dissection of neck (Left, 4/20/2022).    Family History:  family history includes Diabetes type I in her maternal grandmother; Diabetes type II in her maternal grandfather; Heart disease in her maternal grandmother; Hypertension in her father; Infertility in her sister; Kidney failure in her maternal grandmother; Lymphoma in her paternal grandfather; Skin cancer in her father.     Social History:  Social History     Tobacco Use    Smoking status: Never     Passive exposure: Never    Smokeless tobacco: Never   Substance Use Topics    Alcohol use: Yes     Comment: once every couple mos.     Drug use: Never       Review of Systems   Constitutional:  Negative for activity change, chills, diaphoresis, fever and unexpected weight change.   HENT:  Negative for hearing loss, rhinorrhea and trouble swallowing.    Eyes:  Negative for discharge and visual disturbance.        Wears glasses   Respiratory:  Positive for cough. Negative for chest tightness, shortness of breath and wheezing.         Lingering dry cough s/p radiation.    Cardiovascular:  Negative for chest pain and palpitations.   Gastrointestinal:  Negative for blood in stool, constipation, diarrhea and vomiting.   Endocrine: Negative for polydipsia and polyuria.   Genitourinary:  Negative for difficulty urinating, dysuria, hematuria and menstrual problem.   Musculoskeletal:  Negative for arthralgias, joint swelling and neck pain.   Neurological:  Negative for weakness and headaches.   Psychiatric/Behavioral:  Negative for confusion and dysphoric mood.      "    Medications:    Current Outpatient Medications:     paroxetine (PAXIL) 40 MG tablet, Take 1 tablet (40 mg total) by mouth once daily., Disp: 90 tablet, Rfl: 3     Allergies:  Review of patient's allergies indicates:  No Known Allergies    Physical Exam      Vital Signs  Temp: 98.1 °F (36.7 °C)  Pulse: 64  Resp: 18  SpO2: 97 %  BP: 104/76  BP Location: Right arm  Patient Position: Sitting  Pain Score: 0-No pain  Height and Weight  Height: 5' 2" (157.5 cm)  Weight: 82.1 kg (181 lb)  BSA (Calculated - sq m): 1.89 sq meters  BMI (Calculated): 33.1  Weight in (lb) to have BMI = 25: 136.4      Patient Position: Sitting      Physical Exam  Vitals reviewed.   Constitutional:       General: She is not in acute distress.     Appearance: Normal appearance. She is not ill-appearing, toxic-appearing or diaphoretic.   HENT:      Head: Normocephalic and atraumatic.      Right Ear: Tympanic membrane normal.      Left Ear: Tympanic membrane normal.   Eyes:      Extraocular Movements: Extraocular movements intact.      Conjunctiva/sclera: Conjunctivae normal.      Pupils: Pupils are equal, round, and reactive to light.   Neck:      Vascular: No carotid bruit.      Comments: Healed scar to Left neck  Cardiovascular:      Rate and Rhythm: Normal rate and regular rhythm.      Pulses: Normal pulses.      Heart sounds: Normal heart sounds. No murmur heard.  Pulmonary:      Effort: No respiratory distress.      Breath sounds: Normal breath sounds. No wheezing.   Abdominal:      General: Bowel sounds are normal. There is no distension.      Palpations: Abdomen is soft.      Tenderness: There is no abdominal tenderness. There is no guarding.   Neurological:      General: No focal deficit present.      Mental Status: She is alert and oriented to person, place, and time.   Psychiatric:         Mood and Affect: Mood normal.         Behavior: Behavior normal.          Laboratory:  CBC:  Recent Labs   Lab 09/17/22  0210 09/17/22  0220 " 10/03/23  0908 11/19/24  0825   WBC 7.70  --  5.05 4.75   RBC 4.56  --  5.12 4.78   Hemoglobin 14.0  --  15.0 14.0   POC Hematocrit  --    < >  --   --    Hematocrit 40.4  --  45.3 42.4   Platelets 274  --  281 257   MCV 89  --  89 89   MCH 30.7  --  29.3 29.3   MCHC 34.7  --  33.1 33.0    < > = values in this interval not displayed.       CMP:  Recent Labs   Lab 09/17/22  0210 10/03/23  0908 11/19/24  0825   Glucose 104 101 95   Calcium 9.7 9.7 8.9   Albumin 3.8 4.1 3.6   Total Protein 7.2 7.5 6.6   Sodium 137 141 138   Potassium 3.6 4.2 4.4   CO2 24 25 23   Chloride 104 106 105   BUN 16 10 13   Creatinine 0.8 0.8 0.7   Alkaline Phosphatase 81 72 63   ALT 15 21 15   AST 13 21 16   Total Bilirubin 0.2 0.5 0.4        LIPIDS:  Recent Labs   Lab 10/19/22  0842 04/19/23  0906 10/03/23  0908 11/19/24  0825   TSH  --  2.226 3.773 2.626   HDL 59  --  45 56   Cholesterol 218 H  --  226 H 196   Triglycerides 100  --  145 99   LDL Cholesterol 139.0  --  152.0 120.2   HDL/Cholesterol Ratio 27.1  --  19.9 L 28.6   Non-HDL Cholesterol 159  --  181 140   Total Cholesterol/HDL Ratio 3.7  --  5.0 3.5       TSH:  Recent Labs   Lab 04/19/23  0906 10/03/23  0908 11/19/24  0825   TSH 2.226 3.773 2.626       A1C:  Recent Labs   Lab 11/19/24  0825   Hemoglobin A1C 5.1          Other:   Recent Labs   Lab 08/29/22  1345   Follicle Stimulating Hormone 5.08   Estradiol 54     Recent Labs   Lab 09/17/22  0210   Hepatitis C Ab Non-reactive       Assessment/Plan     Yoana Thmoas is a 29 y.o.female with:    Normal physical exam, routine  -     CBC Auto Differential; Future; Expected date: 11/20/2025  -     Comprehensive Metabolic Panel; Future; Expected date: 11/20/2025  -     Hemoglobin A1C; Future; Expected date: 11/20/2025  -     TSH; Future; Expected date: 11/20/2025  - Performed today.  Will check Basic labs.  RTC in 1 yr for fu or sooner if needed    Anxiety  -     paroxetine (PAXIL) 40 MG tablet; Take 1 tablet (40 mg total) by  mouth once daily.  Dispense: 90 tablet; Refill: 3  - Controlled.  Cont current.     Mucoepidermoid carcinoma of salivary gland  - Stable.  Fu by ENT.     Secondary and unspecified malignant neoplasm of lymph nodes of head, face and neck  - Stable.  Fu by ENT.     Class 1 obesity with body mass index (BMI) of 33.0 to 33.9 in adult, unspecified obesity type, unspecified whether serious comorbidity present  Rec diet and exercise for wt loss.      History of head and neck radiation  -     TSH; Future; Expected date: 05/19/2025    Screening for lipoid disorders  -     Lipid Panel; Future; Expected date: 11/20/2025    Chronic conditions status updated as per HPI.  Other than changes above, cont current medications and maintain follow up with specialists. Follow up in about 1 year (around 12/4/2025).      Abbey Leyva MD  [facility]

## 2024-12-03 ENCOUNTER — OFFICE VISIT (OUTPATIENT)
Dept: PRIMARY CARE CLINIC | Facility: CLINIC | Age: 29
End: 2024-12-03
Payer: COMMERCIAL

## 2024-12-03 VITALS
WEIGHT: 181 LBS | TEMPERATURE: 98 F | HEART RATE: 64 BPM | OXYGEN SATURATION: 97 % | SYSTOLIC BLOOD PRESSURE: 104 MMHG | BODY MASS INDEX: 33.31 KG/M2 | HEIGHT: 62 IN | RESPIRATION RATE: 18 BRPM | DIASTOLIC BLOOD PRESSURE: 76 MMHG

## 2024-12-03 DIAGNOSIS — F41.9 ANXIETY: ICD-10-CM

## 2024-12-03 DIAGNOSIS — E66.811 CLASS 1 OBESITY WITH BODY MASS INDEX (BMI) OF 33.0 TO 33.9 IN ADULT, UNSPECIFIED OBESITY TYPE, UNSPECIFIED WHETHER SERIOUS COMORBIDITY PRESENT: ICD-10-CM

## 2024-12-03 DIAGNOSIS — Z92.3 HISTORY OF HEAD AND NECK RADIATION: ICD-10-CM

## 2024-12-03 DIAGNOSIS — C08.9 MUCOEPIDERMOID CARCINOMA OF SALIVARY GLAND: ICD-10-CM

## 2024-12-03 DIAGNOSIS — Z00.00 NORMAL PHYSICAL EXAM, ROUTINE: Primary | ICD-10-CM

## 2024-12-03 DIAGNOSIS — Z13.220 SCREENING FOR LIPOID DISORDERS: ICD-10-CM

## 2024-12-03 DIAGNOSIS — C77.0 SECONDARY AND UNSPECIFIED MALIGNANT NEOPLASM OF LYMPH NODES OF HEAD, FACE AND NECK: ICD-10-CM

## 2024-12-03 PROCEDURE — 99395 PREV VISIT EST AGE 18-39: CPT | Mod: S$GLB,,, | Performed by: INTERNAL MEDICINE

## 2024-12-03 PROCEDURE — 1159F MED LIST DOCD IN RCRD: CPT | Mod: CPTII,S$GLB,, | Performed by: INTERNAL MEDICINE

## 2024-12-03 PROCEDURE — 3078F DIAST BP <80 MM HG: CPT | Mod: CPTII,S$GLB,, | Performed by: INTERNAL MEDICINE

## 2024-12-03 PROCEDURE — 3008F BODY MASS INDEX DOCD: CPT | Mod: CPTII,S$GLB,, | Performed by: INTERNAL MEDICINE

## 2024-12-03 PROCEDURE — 1160F RVW MEDS BY RX/DR IN RCRD: CPT | Mod: CPTII,S$GLB,, | Performed by: INTERNAL MEDICINE

## 2024-12-03 PROCEDURE — 99999 PR PBB SHADOW E&M-EST. PATIENT-LVL III: CPT | Mod: PBBFAC,,, | Performed by: INTERNAL MEDICINE

## 2024-12-03 PROCEDURE — 3044F HG A1C LEVEL LT 7.0%: CPT | Mod: CPTII,S$GLB,, | Performed by: INTERNAL MEDICINE

## 2024-12-03 PROCEDURE — 3074F SYST BP LT 130 MM HG: CPT | Mod: CPTII,S$GLB,, | Performed by: INTERNAL MEDICINE

## 2024-12-03 RX ORDER — PAROXETINE HYDROCHLORIDE 40 MG/1
40 TABLET, FILM COATED ORAL DAILY
Qty: 90 TABLET | Refills: 3 | Status: SHIPPED | OUTPATIENT
Start: 2024-12-03

## 2024-12-12 ENCOUNTER — OFFICE VISIT (OUTPATIENT)
Dept: OBSTETRICS AND GYNECOLOGY | Facility: CLINIC | Age: 29
End: 2024-12-12
Payer: COMMERCIAL

## 2024-12-12 ENCOUNTER — OFFICE VISIT (OUTPATIENT)
Dept: PSYCHIATRY | Facility: CLINIC | Age: 29
End: 2024-12-12
Payer: COMMERCIAL

## 2024-12-12 VITALS
SYSTOLIC BLOOD PRESSURE: 118 MMHG | HEIGHT: 62 IN | DIASTOLIC BLOOD PRESSURE: 80 MMHG | BODY MASS INDEX: 33.13 KG/M2 | HEART RATE: 73 BPM | WEIGHT: 180 LBS

## 2024-12-12 DIAGNOSIS — C77.0 SECONDARY AND UNSPECIFIED MALIGNANT NEOPLASM OF LYMPH NODES OF HEAD, FACE AND NECK: ICD-10-CM

## 2024-12-12 DIAGNOSIS — Z01.419 ENCOUNTER FOR GYNECOLOGICAL EXAMINATION WITHOUT ABNORMAL FINDING: Primary | ICD-10-CM

## 2024-12-12 DIAGNOSIS — F54 PSYCH & BEHAVRL FACTORS ASSOC W DISORD OR DIS CLASSD ELSWHR: Primary | ICD-10-CM

## 2024-12-12 DIAGNOSIS — C08.9 MUCOEPIDERMOID CARCINOMA OF SALIVARY GLAND: ICD-10-CM

## 2024-12-12 DIAGNOSIS — Z11.3 ENCOUNTER FOR SCREENING FOR INFECTIONS WITH A PREDOMINANTLY SEXUAL MODE OF TRANSMISSION: ICD-10-CM

## 2024-12-12 PROCEDURE — 3044F HG A1C LEVEL LT 7.0%: CPT | Mod: CPTII,S$GLB,, | Performed by: PHYSICIAN ASSISTANT

## 2024-12-12 PROCEDURE — 3074F SYST BP LT 130 MM HG: CPT | Mod: CPTII,S$GLB,, | Performed by: PHYSICIAN ASSISTANT

## 2024-12-12 PROCEDURE — 1159F MED LIST DOCD IN RCRD: CPT | Mod: CPTII,S$GLB,, | Performed by: PHYSICIAN ASSISTANT

## 2024-12-12 PROCEDURE — 1160F RVW MEDS BY RX/DR IN RCRD: CPT | Mod: CPTII,S$GLB,, | Performed by: PHYSICIAN ASSISTANT

## 2024-12-12 PROCEDURE — 3008F BODY MASS INDEX DOCD: CPT | Mod: CPTII,S$GLB,, | Performed by: PHYSICIAN ASSISTANT

## 2024-12-12 PROCEDURE — 99999 PR PBB SHADOW E&M-EST. PATIENT-LVL III: CPT | Mod: PBBFAC,,, | Performed by: PHYSICIAN ASSISTANT

## 2024-12-12 PROCEDURE — 99999 PR PBB SHADOW E&M-EST. PATIENT-LVL II: CPT | Mod: PBBFAC,,, | Performed by: PSYCHOLOGIST

## 2024-12-12 PROCEDURE — 87491 CHLMYD TRACH DNA AMP PROBE: CPT | Performed by: PHYSICIAN ASSISTANT

## 2024-12-12 PROCEDURE — 99395 PREV VISIT EST AGE 18-39: CPT | Mod: S$GLB,,, | Performed by: PHYSICIAN ASSISTANT

## 2024-12-12 PROCEDURE — 3079F DIAST BP 80-89 MM HG: CPT | Mod: CPTII,S$GLB,, | Performed by: PHYSICIAN ASSISTANT

## 2024-12-12 NOTE — PROGRESS NOTES
PSYCHO-ONCOLOGY NOTE/ Individual Psychotherapy     Date: 12/12/2024   Site:  [facility]        Therapeutic Intervention: Met with patient.  Outpatient - Behavior modifying psychotherapy 60 min - CPT code 18602  The patient's last visit with me was on 4/18/2023.    Problem list  Patient Active Problem List   Diagnosis    Generalized anxiety disorder    Attention deficit hyperactivity disorder (ADHD), predominantly inattentive type    Mucoepidermoid carcinoma of salivary gland    Secondary and unspecified malignant neoplasm of lymph nodes of head, face and neck    Class 1 obesity with body mass index (BMI) of 33.0 to 33.9 in adult    Normal physical exam, routine    Anxiety       Chief complaint/reason for encounter: adjustment, anxiety   Met with patient to evaluate psychosocial adaptation to diagnosis/treatment/survivorship of parotid cancer    Current Medications  Current Outpatient Medications   Medication    paroxetine (PAXIL) 40 MG tablet     No current facility-administered medications for this visit.       Objective:  Yoana Thomas arrived promptly for the session.  Ms. Thomas was independently ambulatory at the time of session. The patient was fully cooperative throughout the session.  Appearance: age appropriate, casually  dressed, well groomed  Behavior/Cooperation: friendly and cooperative  Speech: normal in rate, volume, and tone and appropriate quality, quantity and organization of sentences  Mood:euthymic  Affect: euthymic  Thought Process: goal-directed, logical  Thought Content: normal,  No delusions or paranoia; did not appear to be responding to internal stimuli during the session  Orientation: grossly intact  Memory: Grossly intact  Attention Span/Concentration: Attends to session without distraction; reports no difficulty  Fund of Knowledge: average  Estimate of Intelligence: above average from verbal skills and history  Cognition: grossly intact  Insight: patient has awareness of  illness; good insight into own behavior and behavior of others  Judgment: the patient's behavior is adequate to circumstances    Interval history and content of current session: Patient discussed events and activities since the time of last visit. Discussed current adaptation to disease and treatment status. Reports to be coping  fairly well, other than recent survivor guilt due to the deaths of 2 friends to cancer . Discussed coping with guilt and bereavement reaction.     SHe is now 2 years out from treatment. She reports minimal anxiety prior to scans. She is feeling more confident in her survivorship.    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      Eating healthily, working out, intentional weight loss.  Also joined a book club to broaden her social [place]. Is casually dating.  (Relationship last year, but did not work out).  Still estranged from sisters, but working on relationship with parents.  Close friends provide support network.       Risk parameters:   Patient reports no suicidal ideation  Patient reports no homicidal ideation  Patient reports no self-injurious behavior  Patient reports no violent behavior   Safety needs:  None at this time      Verbal deficits: None     Patient's response to intervention:The patient's response to intervention is accepting, motivated.     Progress toward goals: Progress as Expected        Patient reported outcomes:      Distress thermometer:       12/12/2024    10:08 AM 11/25/2024    11:21 AM 7/17/2024    11:17 AM 1/3/2024    11:29 AM 9/25/2023    11:17 AM 6/26/2023    10:04 AM 5/9/2023     4:50 PM   DISTRESS SCREENING   Distress Score 0 - No Distress 0 - No Distress  0 - No Distress  0 - No Distress  0 - No Distress  0 - No Distress  0 - No Distress    Practical Concerns None of these  None of these  None of these  None of these  None of these  None of these  None of these    Social Concerns None of these None of these  None of these  None of these  None of these  None of these  None of these    Emotional Concerns Grief or loss None of these  None of these  None of these  None of these  None of these  None of these    Retire Spiritual or Shinto Concerns     No  No  No    Spiritual or Shinto Concerns None of these None of these  None of these  None of these       Physical Concerns None of these None of these  None of these  None of these  None of these  None of these  None of these    Other Problems     Dry mouth          Patient-reported           PHQ-9= Initial visit: 4  PHQ ANSWERS    Q1. Little interest or pleasure in doing things: Not at all (12/12/24 1009)  Q2. Feeling down, depressed, or hopeless: Not at all (12/12/24 1009)  Q3. Trouble falling or staying asleep, or sleeping too much: Not at all (12/12/24 1009)  Q4. Feeling tired or having little energy: Not at all (12/12/24 1009)  Q5. Poor appetite or overeating: Not at all (12/12/24 1009)  Q6. Feeling bad about yourself - or that you are a failure or have let yourself or your family down: Not at all (12/12/24 1009)  Q7. Trouble concentrating on things, such as reading the newspaper or watching television: Not at all (12/12/24 1009)  Q8. Moving or speaking so slowly that other people could have noticed. Or the opposite - being so fidgety or restless that you have been moving around a lot more than usual: Not at all (12/12/24 1009)  Q9.      PHQ8 Score : 0 (12/12/24 1009)  PHQ-9 Total Score: 0 (12/12/24 1009)      FABIAN-7= Initial visit:6       12/12/2024    10:09 AM 4/18/2023     3:09 PM 3/24/2023     2:52 PM   GAD7   1. Feeling nervous, anxious, or on edge? 0 1 1   2. Not being able to stop or control worrying? 0 0 1   3. Worrying too much about different things? 0 1 1   4. Trouble relaxing? 0 1 0   5. Being so restless that it is hard  to sit still? 0 0 0   6. Becoming easily annoyed or irritable? 0 1 1   7. Feeling afraid as if something awful might happen? 0 0 0   8. If you checked off any problems, how difficult have these problems made it for you to do your work, take care of things at home, or get along with other people? 0     FABIAN-7 Score 0 4 4          Client Strengths: verbal, intelligent, successful, good social support, good insight, commitment to wellness,       Treatment Plan:individual psychotherapy  Target symptoms: anxiety   Why chosen therapy is appropriate versus another modality: relevant to diagnosis, patient responds to this modality, evidence based practice  Outcome monitoring methods: self-report, checklist/rating scale  Therapeutic intervention type: behavior modifying psychotherapy  Prognosis: Excellent    Goals from last visit: Met   Behavioral goals prior to next visit:    Exercise: continue time at gym   Stress management:    Social engagement: continue time with friends   Nutrition:    Smoking Cessation:   Therapy: change self-talk when feeling guilty about surviving    Return to clinic: as needed     Length of Service (minutes direct face-to-face contact): 60    Diagnosis:     ICD-10-CM ICD-9-CM   1. Secondary and unspecified malignant neoplasm of lymph nodes of head, face and neck  C77.0 196.0   2. Mucoepidermoid carcinoma of salivary gland  C08.9 142.9         Marcel Barroso, PhD  [place] License #[id number]  [place] License #[id number]

## 2024-12-12 NOTE — PROGRESS NOTES
CC: Well woman exam    Yoana Thomas is a 29 y.o. female  presents for well woman exam.  LMP: Patient's last menstrual period was 2024..  No issues, problems, or complaints.  Periods are now regular. Declines contraception. Agrees to GC/chlamydia screen.   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 Completed radiation therapy to head and neck.   Started HPV vaccine x2 doses in . Declines restarting series at this time.    Pap: 2022 negative  PCP: Abbey Leyva MD  Routine Screening Labs: 2024      Past Medical History:   Diagnosis Date    Anxiety     Attention deficit hyperactivity disorder (ADHD), predominantly inattentive type 2021    COVID-19     2024    Hx of psychiatric care     Therapy      Past Surgical History:   Procedure Laterality Date    CREATION OF MUSCLE ROTATIONAL FLAP Left 3/21/2022    Procedure: CREATION, FLAP, MUSCLE ROTATION-SCM;  Surgeon: Edel Hall MD;  Location: [facility] OR [address] [place] [address];  Service: ENT;  Laterality: Left;    DISSECTION OF NECK Left 2022    Procedure: DISSECTION, NECK;  Surgeon: Edel Hall MD;  Location: [facility] OR [address] [place] [address];  Service: ENT;  Laterality: Left;    EXCISION OF PAROTID GLAND Left 3/21/2022    Procedure: EXCISION, PAROTID GLAND-nerve monitoring;  Surgeon: Edel Hall MD;  Location: [facility] OR [address] [place] [address];  Service: ENT;  Laterality: Left;    KELOID EXCISION      Left ear and she also had radiation Tx    WISDOM TOOTH EXTRACTION       Social History     Socioeconomic History    Marital status: Single   Tobacco Use    Smoking status: Never     Passive exposure: Never    Smokeless tobacco: Never   Substance and Sexual Activity    Alcohol use: Yes     Comment: once every couple mos.     Drug use: Never    Sexual activity: Not Currently     Partners: Male     Birth control/protection: Condom   Social History Narrative     "Works for [employer]    Originally from [place]     Social Drivers of Health     Financial Resource Strain: Medium Risk (2024)    Overall Financial Resource Strain (CARDIA)     Difficulty of Paying Living Expenses: Somewhat hard   Food Insecurity: No Food Insecurity (2024)    Hunger Vital Sign     Worried About Running Out of Food in the Last Year: Never true     Ran Out of Food in the Last Year: Never true   Physical Activity: Sufficiently Active (2024)    Exercise Vital Sign     Days of Exercise per Week: 3 days     Minutes of Exercise per Session: 60 min   Stress: No Stress Concern Present (2024)    [employer] - Occupational Stress Questionnaire     Feeling of Stress : Only a little   Housing Stability: Unknown (2024)    Housing Stability Vital Sign     Unable to Pay for Housing in the Last Year: No     Family History   Problem Relation Name Age of Onset    Skin cancer Father      Hypertension Father      Infertility Sister x2     Diabetes type II Maternal Grandfather      Diabetes type I Maternal Grandmother      Heart disease Maternal Grandmother      Kidney failure Maternal Grandmother          ESRD    Lymphoma Paternal Grandfather       OB History          0    Para   0    Term   0       0    AB   0    Living   0         SAB   0    IAB   0    Ectopic   0    Multiple   0    Live Births   0                 Current Outpatient Medications:     paroxetine (PAXIL) 40 MG tablet, Take 1 tablet (40 mg total) by mouth once daily., Disp: 90 tablet, Rfl: 3    The ASCVD Risk score (Thuan DK, et al., 2019) failed to calculate for the following reasons:    The 2019 ASCVD risk score is only valid for ages 40 to 79    /80 (BP Location: Right arm, Patient Position: Sitting)   Pulse 73   Ht 5' 2" (1.575 m)   Wt 81.6 kg (180 lb)   LMP 2024   BMI 32.92 kg/m²       ROS:  GENERAL: Denies weight gain or weight loss. Feeling " well overall.   SKIN: Denies rash or lesions.   HEAD: Denies head injury or headache.   NODES: Denies enlarged lymph nodes.   CHEST: Denies chest pain or shortness of breath.   CARDIOVASCULAR: Denies palpitations or left sided chest pain.   ABDOMEN: No abdominal pain, constipation, diarrhea, nausea, vomiting or rectal bleeding.   URINARY: No frequency, dysuria, hematuria, or burning on urination.  REPRODUCTIVE: See HPI.   BREASTS: Denies pain, lumps, or nipple discharge.   HEMATOLOGIC: No easy bruisability or excessive bleeding.   MUSCULOSKELETAL: Denies joint pain or swelling.   NEUROLOGIC: Denies syncope or weakness.   PSYCHIATRIC: Denies depression, anxiety or mood swings.    PHYSICAL EXAM:  APPEARANCE: Well nourished, well developed, in no acute distress.  AFFECT: WNL, alert and oriented x 3  CHEST: Good respiratory effect  ABDOMEN: Soft.  No tenderness or masses.  No hepatosplenomegaly.  No hernias.  BREASTS: Symmetrical, no skin changes or visible lesions.  No palpable masses, nipple discharge bilaterally.  PELVIC: Normal external genitalia without lesions.  Normal hair distribution.  Adequate perineal body, normal urethral meatus.  Vagina moist and well rugated without lesions or discharge.  Cervix pink, without lesions, discharge or tenderness.  No significant cystocele or rectocele.  Bimanual exam shows uterus to be normal size, regular, mobile and nontender.  Adnexa without masses or tenderness.    EXTREMITIES: No edema.    ASSESSMENT:   Encounter for gynecological examination without abnormal finding  -     Liquid-Based Pap Smear, Screening    Encounter for screening for infections with a predominantly sexual mode of transmission  -     C. trachomatis/N. gonorrhoeae by AMP DNA [facility]; Vagina    Mucoepidermoid carcinoma of salivary gland          PLAN:   Pap  GC/chlamydia screen  Follow up annually or PRN    Patient was counseled today on A.C.S. Pap guidelines and recommendations for yearly pelvic exams,  mammograms and monthly self breast exams; to see her PCP for other health maintenance.

## 2024-12-20 ENCOUNTER — PATIENT MESSAGE (OUTPATIENT)
Dept: OBSTETRICS AND GYNECOLOGY | Facility: CLINIC | Age: 29
End: 2024-12-20
Payer: COMMERCIAL

## 2025-02-05 NOTE — LETTER
"  Office Visit - Follow Up   Chaim Dee   86 year old male    Date of Visit: 2/5/2025    Chief Complaint   Patient presents with    Follow Up    Fatigue     Pt reports fatigue.         Assessment and Plan   1. Paroxysmal atrial fibrillation (H) (Primary)  This is a new finding on his ICD interrogation.  6 overall burden of A-fib was low.  Metoprolol was increased.  I have messaged his cardiologist, Dr. Ambrose Contreras guards to his recommendation for anticoagulation    2. Chronic HFrEF (heart failure with reduced ejection fraction) (H)  Stable continue same    3. Paroxysmal ventricular tachycardia (H)  Has ICD on metoprolol    4. Primary hypertension  Blood pressure okay continue same    5. Class 2 severe obesity due to excess calories with serious comorbidity and body mass index (BMI) of 38.0 to 38.9 in adult (H)  He has lost some weight recently about 20 pounds, continue with healthy diet regular exercise and modest weight loss    Return in about 3 months (around 5/5/2025) for Follow up.     History of Present Illness   This 86 year old man comes in for follow-up.  Doing much better after viral illnesses       Physical Exam   General Appearance:   No acute distress    /68 (BP Location: Left arm, Patient Position: Sitting, Cuff Size: Adult Large)   Pulse 69   Temp 96.9  F (36.1  C) (Temporal)   Resp 21   Ht 1.778 m (5' 10\")   Wt 118.1 kg (260 lb 6.4 oz)   SpO2 95%   BMI 37.36 kg/m      Heart rate controlled rhythm regular lungs clear to auscultation bilaterally     Additional Information   Current Outpatient Medications   Medication Sig Dispense Refill    acetaminophen (TYLENOL) 325 MG tablet [ACETAMINOPHEN (TYLENOL) 325 MG TABLET] Take 2 tablets (650 mg total) by mouth every 4 (four) hours as needed.  0    clopidogrel (PLAVIX) 75 MG tablet TAKE 1 TABLET(75 MG) BY MOUTH DAILY 90 tablet 1    diphenhydrAMINE (BENADRYL) 25 MG tablet Take 1-2 tablets (25-50 mg) by mouth nightly as needed " June 27, 2022    Yoana Thomas  [address]  [place] [address]             [facility]  [address]  [facility] [address]  Phone: [phone] To whom this may concern,    Please excuse Ms. Thomas from work secondary to side effects from her radiation treatments. I anticipate that she will be ready to return to work around mid August 2022; however, this may vary based upon her recovery. She may return to work as tolerated.     Please do not hesitate to call with any further questions or issues.    Ricky Blanco MD  Radiation Oncology        (angioedema)      famotidine (PEPCID) 20 MG tablet Take 1 tablet (20 mg) by mouth At Bedtime 180 tablet 3    fexofenadine (ALLEGRA) 180 MG tablet Take 1 tablet (180 mg) by mouth daily 90 tablet 3    hydrochlorothiazide (HYDRODIURIL) 25 MG tablet TAKE 1 TABLET BY MOUTH DAILY 90 tablet 1    metoprolol succinate ER (TOPROL XL) 100 MG 24 hr tablet Take 1.5 tablets (150 mg) by mouth daily. 90 tablet 1    nitroglycerin (NITROSTAT) 0.4 MG SL tablet [NITROGLYCERIN (NITROSTAT) 0.4 MG SL TABLET] Place 1 tablet (0.4 mg total) under the tongue as needed. Use as directed. 30 tablet 0    simvastatin (ZOCOR) 40 MG tablet TAKE 1 TABLET(40 MG) BY MOUTH AT BEDTIME 90 tablet 1    vitamin D3 (CHOLECALCIFEROL) 50 mcg (2000 units) tablet Take 1 tablet (50 mcg) by mouth daily         Time:     The longitudinal plan of care for the diagnosis(es)/condition(s) as documented were addressed during this visit. Due to the added complexity in care, I will continue to support Chaim in the subsequent management and with ongoing continuity of care.     Luis Elena MD  Answers submitted by the patient for this visit:  General Questionnaire (Submitted on 2/5/2025)  Chief Complaint: Chronic problems general questions HPI Form  What is the reason for your visit today? : Follow up  How many servings of fruits and vegetables do you eat daily?: 2-3  On average, how many sweetened beverages do you drink each day (Examples: soda, juice, sweet tea, etc.  Do NOT count diet or artificially sweetened beverages)?: 0  How many minutes a day do you exercise enough to make your heart beat faster?: 9 or less  How many days a week do you exercise enough to make your heart beat faster?: 3 or less  How many days per week do you miss taking your medication?: 1  What makes it hard for you to take your medication every day?: remembering to take  Questionnaire about: Chronic problems general questions HPI Form (Submitted on 2/5/2025)  Chief Complaint: Chronic problems  general questions HPI Form

## 2025-02-12 ENCOUNTER — PATIENT MESSAGE (OUTPATIENT)
Dept: OTOLARYNGOLOGY | Facility: CLINIC | Age: 30
End: 2025-02-12
Payer: COMMERCIAL

## 2025-03-24 ENCOUNTER — PATIENT MESSAGE (OUTPATIENT)
Dept: PRIMARY CARE CLINIC | Facility: CLINIC | Age: 30
End: 2025-03-24
Payer: COMMERCIAL

## 2025-03-24 ENCOUNTER — OFFICE VISIT (OUTPATIENT)
Dept: OTOLARYNGOLOGY | Facility: CLINIC | Age: 30
End: 2025-03-24
Payer: COMMERCIAL

## 2025-03-24 VITALS
DIASTOLIC BLOOD PRESSURE: 80 MMHG | BODY MASS INDEX: 34.07 KG/M2 | WEIGHT: 186.31 LBS | SYSTOLIC BLOOD PRESSURE: 113 MMHG

## 2025-03-24 DIAGNOSIS — C08.9 MUCOEPIDERMOID CARCINOMA OF SALIVARY GLAND: Primary | ICD-10-CM

## 2025-03-24 PROCEDURE — 3074F SYST BP LT 130 MM HG: CPT | Mod: CPTII,S$GLB,, | Performed by: OTOLARYNGOLOGY

## 2025-03-24 PROCEDURE — 1160F RVW MEDS BY RX/DR IN RCRD: CPT | Mod: CPTII,S$GLB,, | Performed by: OTOLARYNGOLOGY

## 2025-03-24 PROCEDURE — 99999 PR PBB SHADOW E&M-EST. PATIENT-LVL II: CPT | Mod: PBBFAC,,, | Performed by: OTOLARYNGOLOGY

## 2025-03-24 PROCEDURE — 3008F BODY MASS INDEX DOCD: CPT | Mod: CPTII,S$GLB,, | Performed by: OTOLARYNGOLOGY

## 2025-03-24 PROCEDURE — 99213 OFFICE O/P EST LOW 20 MIN: CPT | Mod: S$GLB,,, | Performed by: OTOLARYNGOLOGY

## 2025-03-24 PROCEDURE — 1159F MED LIST DOCD IN RCRD: CPT | Mod: CPTII,S$GLB,, | Performed by: OTOLARYNGOLOGY

## 2025-03-24 PROCEDURE — 3079F DIAST BP 80-89 MM HG: CPT | Mod: CPTII,S$GLB,, | Performed by: OTOLARYNGOLOGY

## 2025-03-24 NOTE — PROGRESS NOTES
CC: Surveillance    Oncology History   Mucoepidermoid carcinoma of salivary gland   3/21/2022 Surgery    EXCISION, PAROTID GLAND-nerve monitoring (Left)  CREATION, FLAP, MUSCLE ROTATION-SCM (Left)     4/4/2022 Initial Diagnosis    Mucoepidermoid carcinoma of salivary gland     4/4/2022 Cancer Staged    Staging form: Major Salivary Glands, AJCC 8th Edition  - Pathologic stage from 4/4/2022: Stage III (pT2, pN1, cM0)      Radiation Therapy    Treating physician: Alli Blanco  Total Dose: 60 Gy  Fractions: 30    Treatment Summary  Course: C1 H&N 2022  Treatment Site Ref. ID Energy Dose/Fx (Gy) #Fx Dose Correction (Gy) Total Dose (Gy) Start Date End Date Elapsed Days   IM HeadNe Head&Neck 6X 2 30 / 30 0 60 6/6/2022 7/18/2022 42              HPI   30 y.o. female presents with the above treatment history. No complaints.    Review of Systems   Constitutional: Negative for fatigue and unexpected weight change.   HENT: Per HPI.  Eyes: Negative for visual disturbance.   Respiratory: Negative for shortness of breath, hemoptysis   Cardiovascular: Negative for chest pain and palpitations.   Musculoskeletal: Negative for decreased ROM, back pain. :  Skin: Negative for rash, sunburn, itching.   Neurological: Negative for dizziness and seizures.   Hematological: Negative for adenopathy. Does not bruise/bleed easily.   Endocrine: Negative for rapid weight loss/weight gain, heat/cold intolerance.     Past Medical History   Patient Active Problem List   Diagnosis    Generalized anxiety disorder    Attention deficit hyperactivity disorder (ADHD), predominantly inattentive type    Mucoepidermoid carcinoma of salivary gland    Secondary and unspecified malignant neoplasm of lymph nodes of head, face and neck    Class 1 obesity with body mass index (BMI) of 33.0 to 33.9 in adult    Normal physical exam, routine    Anxiety           Past Surgical History   Past Surgical History:   Procedure Laterality Date    CREATION OF MUSCLE ROTATIONAL  FLAP Left 3/21/2022    Procedure: CREATION, FLAP, MUSCLE ROTATION-SCM;  Surgeon: Edel Hall MD;  Location: [facility] OR 2ND FLR;  Service: ENT;  Laterality: Left;    DISSECTION OF NECK Left 4/20/2022    Procedure: DISSECTION, NECK;  Surgeon: Edel Hall MD;  Location: [facility] OR 2ND FLR;  Service: ENT;  Laterality: Left;    EXCISION OF PAROTID GLAND Left 3/21/2022    Procedure: EXCISION, PAROTID GLAND-nerve monitoring;  Surgeon: Edel Hall MD;  Location: [facility] OR 2ND FLR;  Service: ENT;  Laterality: Left;    KELOID EXCISION      Left ear and she also had radiation Tx    WISDOM TOOTH EXTRACTION           Family History   Family History   Problem Relation Name Age of Onset    Skin cancer Father      Hypertension Father      Infertility Sister x2     Diabetes type II Maternal Grandfather      Diabetes type I Maternal Grandmother      Heart disease Maternal Grandmother      Kidney failure Maternal Grandmother          ESRD    Lymphoma Paternal Grandfather             Social History   .  Social History     Socioeconomic History    Marital status: Single   Tobacco Use    Smoking status: Never     Passive exposure: Never    Smokeless tobacco: Never   Substance and Sexual Activity    Alcohol use: Yes     Comment: once every couple mos.     Drug use: Never    Sexual activity: Not Currently     Partners: Male     Birth control/protection: Condom   Social History Narrative    Works for [employer] ([employer])    Originally from [place]     Social Drivers of Health     Financial Resource Strain: Medium Risk (12/2/2024)    Overall Financial Resource Strain (CARDIA)     Difficulty of Paying Living Expenses: Somewhat hard   Food Insecurity: No Food Insecurity (12/2/2024)    Hunger Vital Sign     Worried About Running Out of Food in the Last Year: Never true     Ran Out of Food in the Last Year: Never true   Physical Activity: Sufficiently Active (12/2/2024)    Exercise Vital Sign     Days of Exercise per  Week: 3 days     Minutes of Exercise per Session: 60 min   Stress: No Stress Concern Present (12/2/2024)    [employer] - Occupational Stress Questionnaire     Feeling of Stress : Only a little   Housing Stability: Unknown (12/2/2024)    Housing Stability Vital Sign     Unable to Pay for Housing in the Last Year: No         Allergies   Review of patient's allergies indicates:  No Known Allergies        Physical Exam     Vitals:    03/24/25 1133   BP: 113/80         Body mass index is 34.07 kg/m².      General: AOx3, NAD   Respiratory:  Symmetric chest rise, normal effort  Nose: No gross nasal septal deviation. Inferior Turbinates WNL bilaterally. No septal perforation. No masses/lesions.   Oral Cavity:  Oral Tongue mobile, no lesions noted. Hard Palate WNL. No buccal or FOM lesions.  Oropharynx:  No masses/lesions of the posterior pharyngeal wall. Tonsillar fossa without lesions. Soft palate without masses. Midline uvula.   Neck: Well-healed L neck scar. No cervical lymphadenopathy, thyromegaly or thyroid nodules.  Normal range of motion.    Face: House Brackmann I bilaterally.     Assessment/Plan  Problem List Items Addressed This Visit          Oncology    Mucoepidermoid carcinoma of salivary gland - Primary    HORACIO.  RTC 4 mos.

## 2025-03-25 ENCOUNTER — TELEPHONE (OUTPATIENT)
Dept: HEMATOLOGY/ONCOLOGY | Facility: CLINIC | Age: 30
End: 2025-03-25
Payer: COMMERCIAL

## 2025-03-25 ENCOUNTER — PATIENT MESSAGE (OUTPATIENT)
Dept: ADMINISTRATIVE | Facility: OTHER | Age: 30
End: 2025-03-25
Payer: COMMERCIAL

## 2025-03-28 ENCOUNTER — PATIENT MESSAGE (OUTPATIENT)
Dept: ADMINISTRATIVE | Facility: OTHER | Age: 30
End: 2025-03-28
Payer: COMMERCIAL

## 2025-04-02 ENCOUNTER — PATIENT MESSAGE (OUTPATIENT)
Dept: ADMINISTRATIVE | Facility: OTHER | Age: 30
End: 2025-04-02
Payer: COMMERCIAL

## 2025-06-03 ENCOUNTER — RESULTS FOLLOW-UP (OUTPATIENT)
Dept: PRIMARY CARE CLINIC | Facility: CLINIC | Age: 30
End: 2025-06-03

## 2025-06-03 ENCOUNTER — LAB VISIT (OUTPATIENT)
Dept: LAB | Facility: HOSPITAL | Age: 30
End: 2025-06-03
Attending: INTERNAL MEDICINE
Payer: COMMERCIAL

## 2025-06-03 DIAGNOSIS — Z00.00 NORMAL PHYSICAL EXAM, ROUTINE: ICD-10-CM

## 2025-06-03 LAB — TSH SERPL-ACNC: 3.71 UIU/ML (ref 0.4–4)

## 2025-06-03 PROCEDURE — 84443 ASSAY THYROID STIM HORMONE: CPT

## 2025-06-03 PROCEDURE — 36415 COLL VENOUS BLD VENIPUNCTURE: CPT

## 2025-06-23 ENCOUNTER — PATIENT MESSAGE (OUTPATIENT)
Dept: OTOLARYNGOLOGY | Facility: CLINIC | Age: 30
End: 2025-06-23
Payer: COMMERCIAL

## 2025-07-01 ENCOUNTER — PATIENT MESSAGE (OUTPATIENT)
Dept: RADIATION ONCOLOGY | Facility: CLINIC | Age: 30
End: 2025-07-01
Payer: COMMERCIAL

## 2025-07-02 ENCOUNTER — PATIENT MESSAGE (OUTPATIENT)
Dept: PRIMARY CARE CLINIC | Facility: CLINIC | Age: 30
End: 2025-07-02
Payer: COMMERCIAL

## 2025-07-02 NOTE — TELEPHONE ENCOUNTER
LOV with Abbey Leyva MD , 12/3/2024  Pt updating on her symptoms that she things may be related to her thyroid. Will try to get patient scheduled with you to further discuss. Has f/u [facility]. TSH was last performed on 6/3/25 and was WNL. Has been in range since 2022 when checked

## 2025-07-06 NOTE — PROGRESS NOTES
[facility] Note    Chief Complaint      Chief Complaint   Patient presents with    Follow-up     Thyroid issues        History of Present Illness      Yoana Thomas is a 30 y.o.   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 My neck and face will also swell some days.  Referred by Prev PCP, Dr. Heike Pimentel. Last visit - 12/3/24    Fatigue/BA-  Pt concerned that radiation may have triggered some thyroid issues. She gets her thyroid checked every 6 months through blood work.  Her recent TSH - wnl at beg. of June. She c/o symptoms of hair loss, an inconsistent menstrual cycle, inability to regulate body heat, headaches, fatigue, Gen Body aches, pressure in her eyes, and weight fluctuation.  She has 2 second  cousins with Hashimoto's and is concerned that the radiation triggered a thyroid issue.  Will check some labs incl celeste studies, Zinc, and add Free t4, T3, ESR, CRP .     Irregular cycles - Cycles can last 5-7 days - 1st 3 days heavy (5 super tampons/24 hrs) then lightens up. Cycles occur randomly.  Fu by Ob.  She is on a Vitex Berry suppl per OB.    Facial swelling - She also complains that her neck and face will also swell some days.  She last saw Ophtho in Jan.  I have messaged Dr. Barriga for his thoughts and to see if he rec imaging Head/necl.        Alopecia x a few mos - Will check iron studies, Zinc, and add Free t4, T3. She  is not on biotin.     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 Castro 2/7/22. Fu by ENT, Dr. Hall. CT neck - 3/4/22. S/p FNA - consistent with parotid low-grade neoplasm. S/p parotidectomy 3/21/22 - Mucoepidermoid carcinoma of salivary gland. PET CT 4/8/22.  S/P Left Neck Dissection 4/20/22. Fu by Rad/Onc. S/p adjuvant radiation  which she completed 7/18/22.  Fu by Speech Tx.   CTA neck 9/17/22 - Postoperative change of left parotid mucoepidermoid carcinoma resection.  Superficial thickening and heterogeneous enhancement of the gland which may be related to postoperative or radiation changes.  5 mm soft tissue density in the superficial left parotid that may represent an intraparotid lymph node, however attention on follow-up exams is recommended. Abscess drained 9/20/22  CT chest - 4/20/23 - No evidence of metastatic disease. Fu with ENT.  Thyroid functions are normal. Fu TSH Q 6 mos. Left paraspinal neck tightness. She will try stretching exercises at home. If persists rec consider PT. CT neck - 11/26/24. Remote operative change left parotidectomy and neck dissection. No evidence for enhancing mass or adenopathy to suggest definite worsening residual or recurrent disease. Grade 1 anterolisthesis of C2 on C3 similar to prior. Has Fu 7/24/25 with Dr. Barriga.  Will discuss current Sxs with Dr. Barriga.     Borderline HLD -  She does not require medication for this at this time but I do rec a low cholesterol diet and exercise.  Improved. She limits red meat.      RT lung nodule - Seen on CT - 4/20/23 -Unchanged 0.4 cm right lower lobe perifissural nodule, likely a lymph node.      Anxiety - Pt on Paxil 40 mg/d for the past 2-3 yrs. She is doing well on this. She does not miss doses of this.      ADHD - Pt not currently on meds for this.      Obesity - BMI - 33.95- down 4 lb since last visit.   She was 89 at beg. of pandemic.  We discussed pharmacotherapy. Given RadTx to the neck I would be hesitant to start Ozempic or mounjaro due to potential for inc risk of MTC. Plans to work on lifestyle modification. Could consider adipex, contrave, Qsymia. She  cut out red meat again which helped with wt loss in past. She plans to cut back on gluten because she feels better off of it.       Lab review: 1/26/21 HA1c - 5.5;  TSH - 2.76;   HDL 51  ; BUN/Cr - 12/0.77; H/H - 14.2/41.5     HCM - Flu - declines;  Tdap -  4/19/23; HPV vaccine - # 1 8/2/22 and # 2;   COVID - 19 Vaccine (Pfizer) #1 - 3/30/21; #2 - 4/23/21; #3 ; MGM - none;  DEXA - none;  PAP -8/2/22- neg ; Hep C Screen - 9/16/22 - neg.;  HIV Screen -  9/16/22 - neg.; C-scope - none; Prev PCP - Dr. Heike Pimentel; Prev OB/GYN - in [place]; well visit - 12/3/24     Patient Care Team:  Abbey Leyva MD as PCP - General (Internal Medicine)     Health Maintenance:  Immunization History   Administered Date(s) Administered    COVID-19 Vaccine 04/01/2021    COVID-19, MRNA, LN-S, PF (Pfizer) (Purple Cap) 03/30/2021, 04/23/2021    HPV 9-Valent 08/02/2022, 10/19/2022    Influenza - Quadrivalent 10/01/2020    Influenza - Quadrivalent - PF *Preferred* (6 months and older) 10/19/2022    Tdap 04/19/2023      Health Maintenance   Topic Date Due    Pneumococcal Vaccines (Age 0-49) (1 of 2 - PCV) Never done    COVID-19 Vaccine (4 - 2024-25 season) 09/01/2024    Influenza Vaccine (1) 09/01/2025    Cervical Cancer Screening  12/12/2027    TETANUS VACCINE  04/19/2033    RSV Vaccine (Age 60+ and Pregnant patients) (1 - 1-dose 75+ series) 03/17/2070    Hepatitis C Screening  Completed    HIV Screening  Completed    Lipid Panel  Completed        Past Medical History:  Past Medical History:   Diagnosis Date    Anxiety     Attention deficit hyperactivity disorder (ADHD), predominantly inattentive type 06/14/2021    COVID-19     7/2024    Facial trauma 1/1/2021    lump on neck    GERD (gastroesophageal reflux disease) couple of years    off and on    Hearing loss 2018    possible side effect of radiation therapy    Hx of psychiatric care     Migraine headache 2013    off and on    Radiation 2014    4 radiation treatments on left ear after Keloid removed    Therapy        Past Surgical History:   has a past surgical history that includes Keloid excision; Crockett tooth extraction; Excision of parotid gland (Left,  "03/21/2022); Creation of muscle rotational flap (Left, 03/21/2022); Dissection of neck (Left, 04/20/2022); and External ear surgery (2014).    Family History:  family history includes Cancer in her paternal aunt and paternal grandfather; Diabetes in her maternal grandfather and maternal grandmother; Diabetes type I in her maternal grandmother; Diabetes type II in her maternal grandfather; Heart disease in her maternal grandmother; Hypertension in her father; Infertility in her sister; Kidney failure in her maternal grandmother; Lymphoma in her paternal grandfather; Skin cancer in her father.     Social History:  Social History[1]    Review of Systems   Constitutional:  Positive for diaphoresis and fatigue. Negative for chills, fever and night sweats.        Inc fatigue the past 2-3 wks. "She sleeps a lot". She just goes to work and home and goes to bed. She hasn't even george able to work out the past couple wks.    HENT:  Negative for hearing loss, rhinorrhea, sore throat and tinnitus.    Eyes:  Negative for visual disturbance.   Respiratory:  Positive for shortness of breath. Negative for apnea, cough, chest tightness and wheezing.         No known snoring or apnea. Gets HART when coming back up the steps after walking her dog for 15 min.    Cardiovascular:  Positive for palpitations. Negative for chest pain and leg swelling.        When she comes back from walking the dog.    Gastrointestinal:  Negative for abdominal pain, blood in stool, constipation, diarrhea, nausea and vomiting.   Endocrine: Positive for heat intolerance. Negative for cold intolerance and polydipsia.        Just walking from her Car into [employer] takes her the whole service to stop sweating. "It takes forever to cool down".    Genitourinary:  Positive for menstrual irregularity. Negative for hematuria.   Musculoskeletal:  Positive for arthralgias and myalgias.        "Every where mainly upper body, arms, and back".   Neurological:  Positive for " "headaches. Negative for dizziness.   Psychiatric/Behavioral:  Negative for depressed mood. The patient is nervous/anxious.         Regarding how she has been feeling.         Medications:  Current Medications[2]     Allergies:  Review of patient's allergies indicates:  No Known Allergies    Physical Exam      Vital Signs  Temp: 98.4 °F (36.9 °C)  Temp Source: Oral  Pulse: 78  Resp: 17  SpO2: 98 %  BP: 122/84  BP Location: Right arm  Patient Position: Sitting  Pain Score:   3  Height and Weight  Height: 5' 2" (157.5 cm)  Weight: 84.2 kg (185 lb 10 oz)  BSA (Calculated - sq m): 1.92 sq meters  BMI (Calculated): 33.9  Weight in (lb) to have BMI = 25: 136.4      Patient Position: Sitting      Physical Exam  Vitals reviewed.   Constitutional:       General: She is not in acute distress.     Appearance: Normal appearance. She is not ill-appearing, toxic-appearing or diaphoretic.   HENT:      Head: Normocephalic and atraumatic.      Right Ear: Tympanic membrane normal.      Left Ear: Tympanic membrane normal.      Mouth/Throat:      Mouth: Mucous membranes are moist.   Eyes:      Extraocular Movements: Extraocular movements intact.      Conjunctiva/sclera: Conjunctivae normal.      Pupils: Pupils are equal, round, and reactive to light.   Neck:      Vascular: No carotid bruit.      Comments: Healed scar to Left neck  Cardiovascular:      Rate and Rhythm: Normal rate and regular rhythm.      Pulses: Normal pulses.      Heart sounds: Normal heart sounds. No murmur heard.  Pulmonary:      Effort: No respiratory distress.      Breath sounds: Normal breath sounds. No wheezing.   Abdominal:      General: Bowel sounds are normal. There is no distension.      Palpations: Abdomen is soft.      Tenderness: There is no abdominal tenderness. There is no guarding or rebound.   Musculoskeletal:         General: Normal range of motion.   Skin:     General: Skin is warm.   Neurological:      General: No focal deficit present.      Mental " Status: She is alert and oriented to person, place, and time.   Psychiatric:         Mood and Affect: Mood normal.         Behavior: Behavior normal.          Laboratory:  CBC:  Recent Labs   Lab 10/03/23  0908 11/19/24  0825 07/10/25  1210   WBC 5.05 4.75 4.28   RBC 5.12 4.78 4.77   Hemoglobin 15.0 14.0  --    HGB  --   --  13.9   Hematocrit 45.3 42.4  --    HCT  --   --  41.6   Platelet Count  --   --  274   Platelets 281 257  --    MCV 89 89 87   MCH 29.3 29.3 29.1   MCHC 33.1 33.0 33.4       CMP:  Recent Labs   Lab 09/17/22  0210 10/03/23  0908 11/19/24  0825   Glucose 104 101 95   Calcium 9.7 9.7 8.9   Albumin 3.8 4.1 3.6   Total Protein 7.2 7.5 6.6   Sodium 137 141 138   Potassium 3.6 4.2 4.4   CO2 24 25 23   Chloride 104 106 105   BUN 16 10 13   Creatinine 0.8 0.8 0.7   Alkaline Phosphatase 81 72 63   ALT 15 21 15   AST 13 21 16   Total Bilirubin 0.2 0.5 0.4          LIPIDS:  Recent Labs   Lab 10/19/22  0842 04/19/23  0906 10/03/23  0908 11/19/24  0825 06/03/25  0729   TSH  --    < > 3.773 2.626 3.714   HDL 59  --  45 56  --    Cholesterol 218 H  --  226 H 196  --    Triglycerides 100  --  145 99  --    LDL Cholesterol 139.0  --  152.0 120.2  --    HDL/Cholesterol Ratio 27.1  --  19.9 L 28.6  --    Non-HDL Cholesterol 159  --  181 140  --    Total Cholesterol/HDL Ratio 3.7  --  5.0 3.5  --     < > = values in this interval not displayed.       TSH:  Recent Labs   Lab 10/03/23  0908 11/19/24  0825 06/03/25  0729   TSH 3.773 2.626 3.714       A1C:  Recent Labs   Lab 11/19/24  0825   Hemoglobin A1C 5.1          Other:   Recent Labs   Lab 08/29/22  1345 07/10/25  1210   Follicle Stimulating Hormone 5.08  --    Estradiol 54  --    Ferritin  --  29.0   Iron Level  --  67   Transferrin  --  265   Iron Binding Capacity Total  --  392     Recent Labs   Lab 09/17/22  0210   Hepatitis C Ab Non-reactive       Assessment/Plan     Yoana Rogelio Thomas is a 30 y.o.female with:    Alopecia  -     Ferritin; Future; Expected  date: 07/10/2025  -     Iron and TIBC; Future; Expected date: 07/10/2025  -     CBC Auto Differential; Future; Expected date: 07/10/2025  -     Zinc; Future; Expected date: 07/10/2025  -     T4, Free; Future; Expected date: 07/10/2025  -     T3; Future; Expected date: 07/10/2025  - Check iron studies,Zinc, CBC.     Generalized body aches  -     C-Reactive Protein; Future; Expected date: 07/10/2025  -     Sedimentation rate; Future; Expected date: 07/10/2025  - Check inflammatory markers. No evid of FM on exam.     Facial swelling  -Unclear etiol.  Will d/w Dr. Barriga to see if Imaging is warranted.     Anxiety  - Stable.  Cont current regimen. Pt compliant with meds.     Mucoepidermoid carcinoma of salivary gland  - Has upcoming fu with ENT, Dr. Barriga.     Class 1 obesity with body mass index (BMI) of 33.0 to 33.9 in adult, unspecified obesity type, unspecified whether serious comorbidity present  - Cont diet and exercise.     Fatigue, unspecified type  - Check CBC, Iron studies.        Chronic conditions status updated as per HPI.  Other than changes above, cont current medications and maintain follow up with specialists.    Follow up virtually in about 4 weeks (around 8/7/2025).      Abbey Leyva MD  [facility]                       [1]   Social History  Tobacco Use    Smoking status: Never     Passive exposure: Never    Smokeless tobacco: Never   Vaping Use    Vaping status: Never Used   Substance Use Topics    Alcohol use: Not Currently     Comment: once every couple mos.     Drug use: Never   [2]   Current Outpatient Medications:     paroxetine (PAXIL) 40 MG tablet, Take 1 tablet (40 mg total) by mouth once daily., Disp: 90 tablet, Rfl: 3

## 2025-07-10 ENCOUNTER — OFFICE VISIT (OUTPATIENT)
Dept: PRIMARY CARE CLINIC | Facility: CLINIC | Age: 30
End: 2025-07-10
Payer: COMMERCIAL

## 2025-07-10 ENCOUNTER — LAB VISIT (OUTPATIENT)
Dept: LAB | Facility: HOSPITAL | Age: 30
End: 2025-07-10
Attending: INTERNAL MEDICINE
Payer: COMMERCIAL

## 2025-07-10 VITALS
HEART RATE: 78 BPM | TEMPERATURE: 98 F | BODY MASS INDEX: 34.16 KG/M2 | HEIGHT: 62 IN | RESPIRATION RATE: 17 BRPM | OXYGEN SATURATION: 98 % | WEIGHT: 185.63 LBS | DIASTOLIC BLOOD PRESSURE: 84 MMHG | SYSTOLIC BLOOD PRESSURE: 122 MMHG

## 2025-07-10 DIAGNOSIS — C08.9 MUCOEPIDERMOID CARCINOMA OF SALIVARY GLAND: ICD-10-CM

## 2025-07-10 DIAGNOSIS — R52 GENERALIZED BODY ACHES: ICD-10-CM

## 2025-07-10 DIAGNOSIS — R22.0 FACIAL SWELLING: ICD-10-CM

## 2025-07-10 DIAGNOSIS — E66.811 CLASS 1 OBESITY WITH BODY MASS INDEX (BMI) OF 33.0 TO 33.9 IN ADULT, UNSPECIFIED OBESITY TYPE, UNSPECIFIED WHETHER SERIOUS COMORBIDITY PRESENT: ICD-10-CM

## 2025-07-10 DIAGNOSIS — L65.9 ALOPECIA: ICD-10-CM

## 2025-07-10 DIAGNOSIS — R53.83 FATIGUE, UNSPECIFIED TYPE: ICD-10-CM

## 2025-07-10 DIAGNOSIS — L65.9 ALOPECIA: Primary | ICD-10-CM

## 2025-07-10 DIAGNOSIS — F41.9 ANXIETY: ICD-10-CM

## 2025-07-10 LAB
ABSOLUTE EOSINOPHIL (OHS): 0.04 K/UL
ABSOLUTE MONOCYTE (OHS): 0.35 K/UL (ref 0.3–1)
ABSOLUTE NEUTROPHIL COUNT (OHS): 2.83 K/UL (ref 1.8–7.7)
BASOPHILS # BLD AUTO: 0.02 K/UL
BASOPHILS NFR BLD AUTO: 0.5 %
CRP SERPL-MCNC: 1.8 MG/L
ERYTHROCYTE [DISTWIDTH] IN BLOOD BY AUTOMATED COUNT: 12.5 % (ref 11.5–14.5)
ERYTHROCYTE [SEDIMENTATION RATE] IN BLOOD BY PHOTOMETRIC METHOD: 6 MM/HR
FERRITIN SERPL-MCNC: 29 NG/ML (ref 20–300)
HCT VFR BLD AUTO: 41.6 % (ref 37–48.5)
HGB BLD-MCNC: 13.9 GM/DL (ref 12–16)
IMM GRANULOCYTES # BLD AUTO: 0.01 K/UL (ref 0–0.04)
IMM GRANULOCYTES NFR BLD AUTO: 0.2 % (ref 0–0.5)
IRON SATN MFR SERPL: 17 % (ref 20–50)
IRON SERPL-MCNC: 67 UG/DL (ref 30–160)
LYMPHOCYTES # BLD AUTO: 1.03 K/UL (ref 1–4.8)
MCH RBC QN AUTO: 29.1 PG (ref 27–31)
MCHC RBC AUTO-ENTMCNC: 33.4 G/DL (ref 32–36)
MCV RBC AUTO: 87 FL (ref 82–98)
NUCLEATED RBC (/100WBC) (OHS): 0 /100 WBC
PLATELET # BLD AUTO: 274 K/UL (ref 150–450)
PMV BLD AUTO: 9.8 FL (ref 9.2–12.9)
RBC # BLD AUTO: 4.77 M/UL (ref 4–5.4)
RELATIVE EOSINOPHIL (OHS): 0.9 %
RELATIVE LYMPHOCYTE (OHS): 24.1 % (ref 18–48)
RELATIVE MONOCYTE (OHS): 8.2 % (ref 4–15)
RELATIVE NEUTROPHIL (OHS): 66.1 % (ref 38–73)
T3FREE SERPL-MCNC: 84 NG/DL (ref 60–180)
T4 FREE SERPL-MCNC: 0.88 NG/DL (ref 0.71–1.51)
TIBC SERPL-MCNC: 392 UG/DL (ref 250–450)
TRANSFERRIN SERPL-MCNC: 265 MG/DL (ref 200–375)
WBC # BLD AUTO: 4.28 K/UL (ref 3.9–12.7)

## 2025-07-10 PROCEDURE — 86140 C-REACTIVE PROTEIN: CPT

## 2025-07-10 PROCEDURE — 36415 COLL VENOUS BLD VENIPUNCTURE: CPT | Mod: PN

## 2025-07-10 PROCEDURE — 84480 ASSAY TRIIODOTHYRONINE (T3): CPT

## 2025-07-10 PROCEDURE — 85025 COMPLETE CBC W/AUTO DIFF WBC: CPT

## 2025-07-10 PROCEDURE — 84630 ASSAY OF ZINC: CPT

## 2025-07-10 PROCEDURE — 3008F BODY MASS INDEX DOCD: CPT | Mod: CPTII,S$GLB,, | Performed by: INTERNAL MEDICINE

## 2025-07-10 PROCEDURE — 3079F DIAST BP 80-89 MM HG: CPT | Mod: CPTII,S$GLB,, | Performed by: INTERNAL MEDICINE

## 2025-07-10 PROCEDURE — 83540 ASSAY OF IRON: CPT

## 2025-07-10 PROCEDURE — 82728 ASSAY OF FERRITIN: CPT

## 2025-07-10 PROCEDURE — 99999 PR PBB SHADOW E&M-EST. PATIENT-LVL IV: CPT | Mod: PBBFAC,,, | Performed by: INTERNAL MEDICINE

## 2025-07-10 PROCEDURE — 3074F SYST BP LT 130 MM HG: CPT | Mod: CPTII,S$GLB,, | Performed by: INTERNAL MEDICINE

## 2025-07-10 PROCEDURE — 99214 OFFICE O/P EST MOD 30 MIN: CPT | Mod: S$GLB,,, | Performed by: INTERNAL MEDICINE

## 2025-07-10 PROCEDURE — 84439 ASSAY OF FREE THYROXINE: CPT

## 2025-07-10 PROCEDURE — 85652 RBC SED RATE AUTOMATED: CPT

## 2025-07-14 LAB — W ZINC: 64 UG/DL

## 2025-07-21 ENCOUNTER — RESULTS FOLLOW-UP (OUTPATIENT)
Dept: PRIMARY CARE CLINIC | Facility: CLINIC | Age: 30
End: 2025-07-21
Payer: COMMERCIAL

## 2025-07-21 DIAGNOSIS — E61.1 IRON DEFICIENCY: Primary | ICD-10-CM

## 2025-07-21 NOTE — PROGRESS NOTES
I sent pt a my chart message -  I tried to call you. I'm  sorry I missed you.  If you have further questions we can set up a virtual. I reviewed your labs.  Your iron studies were slightly low. You are not anemic. Have you noticed any blood in your stool or black sticky stool? Have you had heavy cycles? I recommend you start an otc iron supplement  Slow FE or Ferrous gluconate 240 mg 3 times per wk. Please note that iron supplements can cause constipation and darker stools.  Please alert me if you have any issues tolerating this medication.   Administration Recommendations   Take on an empty stomach, 30 minutes before or 2 hours after meals. Can be taken with orange juice or vitamin C to improve absorption.    Your zinc level was normal. Your inflammatory markers (ESR and CRP) were normal.   Your thyroid functions were normal. I d/w Dr. Barriga and he did not feel your thyroid is the cause of your current symptoms. He thought Brain imaging would be reasonable as well as an endocrinology consult. If you are agreeable to the referral I will place this and will also place the imaging of the brain.   Dr. GRIFFITHS

## 2025-07-21 NOTE — TELEPHONE ENCOUNTER
I called to d/w pt. No answer. Left message to return call. I'll send her a portal message.   Dr. GRIFFITHS

## 2025-07-23 ENCOUNTER — TELEPHONE (OUTPATIENT)
Dept: PRIMARY CARE CLINIC | Facility: CLINIC | Age: 30
End: 2025-07-23
Payer: COMMERCIAL

## 2025-07-23 NOTE — TELEPHONE ENCOUNTER
LOV 7/10/25  RTC 8/11/25    LVM for pt To Call about referral        Pt requesting order For Endocrinology   Please add Dx code   Order pending                 Copied from CRM #[id number]. Topic: General Inquiry - Patient Advice  >> Jul 23, 2025  1:31 PM Rubia wrote:  Type:  Patient Requesting Referral    Who Called: Patient  Does the patient already have the specialty appointment scheduled?:no  If yes, what is the date of that appointment?:  Referral to What Specialty:Endocrinology  Reason for Referral:for symptoms she has been having  Does the patient want the referral with a specific physician?:[facility]  [facility] the specialist [facility]  Patient Requesting a Response?:yes  Would the patient rather a call back or a response via [facility]? portal  Best Call Back Number: [phone]  Additional Information:     Please call and advise.    Thank You   01-Nov-2020 15:05

## 2025-07-23 NOTE — TELEPHONE ENCOUNTER
Spoke With pt confirm she Would like referral for Endocrinology   Will wait To see them before getting MRI       Please Advise        Copied from CRM #[id number]. Topic: General Inquiry - Return Call  >> Jul 23, 2025  1:48 PM Chrissy wrote:  Type:  Patient Returning Call    Who Called:Patient/Rogelio  Who Left Message for Patient:Pia  Does the patient know what this is regarding?:Yes  Would the patient rather a call back or a response via [facility]? Call back   Best Call Back Number:[phone]  Additional Information:

## 2025-07-23 NOTE — TELEPHONE ENCOUNTER
----- Message from Rubia sent at 7/23/2025  1:35 PM CDT -----  Contact: Pt [phone]  Type:  Patient Requesting Referral    Who Called: Patient  Does the patient already have the specialty appointment scheduled?:no  If yes, what is the date of that appointment?:  Referral to What Specialty:Endocrinology  Reason for Referral:for symptoms she has been having  Does the patient want the referral with a specific physician?:[facility]  [facility]  Patient Requesting a Response?:yes  Would the patient rather a call back or a response via [facility]? portal  Best Call Back Number: [phone]  Additional Information:     Please call and advise.    Thank You

## 2025-07-24 ENCOUNTER — OFFICE VISIT (OUTPATIENT)
Dept: OTOLARYNGOLOGY | Facility: CLINIC | Age: 30
End: 2025-07-24
Payer: COMMERCIAL

## 2025-07-24 VITALS
BODY MASS INDEX: 33.95 KG/M2 | DIASTOLIC BLOOD PRESSURE: 82 MMHG | WEIGHT: 185.63 LBS | SYSTOLIC BLOOD PRESSURE: 120 MMHG

## 2025-07-24 DIAGNOSIS — C08.9 MUCOEPIDERMOID CARCINOMA OF SALIVARY GLAND: Primary | ICD-10-CM

## 2025-07-24 PROCEDURE — 3074F SYST BP LT 130 MM HG: CPT | Mod: CPTII,S$GLB,, | Performed by: OTOLARYNGOLOGY

## 2025-07-24 PROCEDURE — 1160F RVW MEDS BY RX/DR IN RCRD: CPT | Mod: CPTII,S$GLB,, | Performed by: OTOLARYNGOLOGY

## 2025-07-24 PROCEDURE — 3079F DIAST BP 80-89 MM HG: CPT | Mod: CPTII,S$GLB,, | Performed by: OTOLARYNGOLOGY

## 2025-07-24 PROCEDURE — 1159F MED LIST DOCD IN RCRD: CPT | Mod: CPTII,S$GLB,, | Performed by: OTOLARYNGOLOGY

## 2025-07-24 PROCEDURE — 99213 OFFICE O/P EST LOW 20 MIN: CPT | Mod: S$GLB,,, | Performed by: OTOLARYNGOLOGY

## 2025-07-24 PROCEDURE — 99999 PR PBB SHADOW E&M-EST. PATIENT-LVL III: CPT | Mod: PBBFAC,,, | Performed by: OTOLARYNGOLOGY

## 2025-07-24 PROCEDURE — 3008F BODY MASS INDEX DOCD: CPT | Mod: CPTII,S$GLB,, | Performed by: OTOLARYNGOLOGY

## 2025-07-24 NOTE — PROGRESS NOTES
CC: Surveillance    Oncology History   Mucoepidermoid carcinoma of salivary gland   3/21/2022 Surgery    EXCISION, PAROTID GLAND-nerve monitoring (Left)  CREATION, FLAP, MUSCLE ROTATION-SCM (Left)     4/4/2022 Initial Diagnosis    Mucoepidermoid carcinoma of salivary gland     4/4/2022 Cancer Staged    Staging form: Major Salivary Glands, AJCC 8th Edition  - Pathologic stage from 4/4/2022: Stage III (pT2, pN1, cM0)      Radiation Therapy    Treating physician: Alli Blanco  Total Dose: 60 Gy  Fractions: 30    Treatment Summary  Course: C1 H&N 2022  Treatment Site Ref. ID Energy Dose/Fx (Gy) #Fx Dose Correction (Gy) Total Dose (Gy) Start Date End Date Elapsed Days   IM HeadNe Head&Neck 6X 2 30 / 30 0 60 6/6/2022 7/18/2022 42              HPI   30 y.o. female presents with the above treatment history. No complaints aside from recent weight fluctuation, fatigue, hair loss and irregular menses.  She has cut out gluten from her diet and reports that she is feeling somewhat better.    Review of Systems   Constitutional:  As above.  HENT: Per HPI.  Eyes: Negative for visual disturbance.   Respiratory: Negative for shortness of breath, hemoptysis   Cardiovascular: Negative for chest pain and palpitations.   Musculoskeletal: Negative for decreased ROM, back pain. :  Skin: Negative for rash, sunburn, itching.   Neurological: Negative for dizziness and seizures.   Hematological: Negative for adenopathy. Does not bruise/bleed easily.   Endocrine:  As above..     Past Medical History   Patient Active Problem List   Diagnosis    Generalized anxiety disorder    Attention deficit hyperactivity disorder (ADHD), predominantly inattentive type    Mucoepidermoid carcinoma of salivary gland    Secondary and unspecified malignant neoplasm of lymph nodes of head, face and neck    Class 1 obesity with body mass index (BMI) of 33.0 to 33.9 in adult    Normal physical exam, routine    Anxiety           Past Surgical History   Past Surgical  History:   Procedure Laterality Date    CREATION OF MUSCLE ROTATIONAL FLAP Left 03/21/2022    Procedure: CREATION, FLAP, MUSCLE ROTATION-SCM;  Surgeon: Edel Hall MD;  Location: [facility] OR [address] [place] [address];  Service: ENT;  Laterality: Left;    DISSECTION OF NECK Left 04/20/2022    Procedure: DISSECTION, NECK;  Surgeon: Edel Hall MD;  [facility];  Service: ENT;  Laterality: Left;    EXCISION OF PAROTID GLAND Left 03/21/2022    Procedure: EXCISION, PAROTID GLAND-nerve monitoring;  Surgeon: Edel Hall MD;  [facility];  Service: ENT;  Laterality: Left;    EXTERNAL EAR SURGERY  2014    keloid removal    KELOID EXCISION      Left ear and she also had radiation Tx    WISDOM TOOTH EXTRACTION           Family History   Family History   Problem Relation Name Age of Onset    Skin cancer Father Carroll     Hypertension Father Carroll     Infertility Sister x2     Diabetes type I Maternal Grandmother Amparo     Heart disease Maternal Grandmother Amparo     Kidney failure Maternal Grandmother Amparo         ESRD    Diabetes Maternal Grandmother Amparo     Diabetes type II Maternal Grandfather Fabien     Diabetes Maternal Grandfather Fabien     Lymphoma Paternal Grandfather Lyle     Cancer Paternal Grandfather Lyle         Lung Cancer    Cancer Paternal Aunt Racheal         Cervical Cancer           Social History   .  Social History     Socioeconomic History    Marital status: Single   Tobacco Use    Smoking status: Never     Passive exposure: Never    Smokeless tobacco: Never   Vaping Use    Vaping status: Never Used   Substance and Sexual Activity    Alcohol use: Not Currently     Comment: once every couple mos.     Drug use: Never    Sexual activity: Not Currently     Partners: Male     Birth control/protection: Condom   Social History Narrative    Works for [employer] ([employer])    Originally from [place]     Social Drivers of Health     Financial Resource Strain: Medium  Risk (7/7/2025)    Overall Financial Resource Strain (CARDIA)     Difficulty of Paying Living Expenses: Somewhat hard   Food Insecurity: No Food Insecurity (7/7/2025)    Hunger Vital Sign     Worried About Running Out of Food in the Last Year: Never true     Ran Out of Food in the Last Year: Never true   Transportation Needs: No Transportation Needs (7/7/2025)    PRAPARE - Transportation     Lack of Transportation (Medical): No     Lack of Transportation (Non-Medical): No   Physical Activity: Sufficiently Active (7/7/2025)    Exercise Vital Sign     Days of Exercise per Week: 3 days     Minutes of Exercise per Session: 90 min   Stress: Stress Concern Present (7/7/2025)    [employer] - Occupational Stress Questionnaire     Feeling of Stress : To some extent   Housing Stability: Low Risk  (7/7/2025)    Housing Stability Vital Sign     Unable to Pay for Housing in the Last Year: No     Number of Times Moved in the Last Year: 0     Homeless in the Last Year: No         Allergies   Review of patient's allergies indicates:  No Known Allergies        Physical Exam     Vitals:    07/24/25 1340   BP: 120/82         Body mass index is 33.95 kg/m².      General: AOx3, NAD   Respiratory:  Symmetric chest rise, normal effort  Nose: No gross nasal septal deviation. Inferior Turbinates WNL bilaterally. No septal perforation. No masses/lesions.   Oral Cavity:  Oral Tongue mobile, no lesions noted. Hard Palate WNL. No buccal or FOM lesions.  Oropharynx:  No masses/lesions of the posterior pharyngeal wall. Tonsillar fossa without lesions. Soft palate without masses. Midline uvula.   Neck: Well-healed L neck scar. No cervical lymphadenopathy, thyromegaly or thyroid nodules.  Normal range of motion.    Face: House Brackmann I bilaterally.     Assessment/Plan  Problem List Items Addressed This Visit          Oncology    Mucoepidermoid carcinoma of salivary gland - Primary    HORACIO.  Return in 4 months.   Endocrine referral for alopecia and irregular menses.         Relevant Orders    Ambulatory referral/consult to Endocrinology

## 2025-07-25 NOTE — TELEPHONE ENCOUNTER
Spoke with pt.  He HA and BA are better since cutting out gluten.  Will check Celiac panel. She saw ENT yest.  She will monitor sx's and let me know in the next 1-2 wks if she is still interested in brain imaging.   Dr. GRIFFITHS

## 2025-07-28 ENCOUNTER — LAB VISIT (OUTPATIENT)
Dept: LAB | Facility: HOSPITAL | Age: 30
End: 2025-07-28
Attending: INTERNAL MEDICINE
Payer: COMMERCIAL

## 2025-07-28 DIAGNOSIS — E61.1 IRON DEFICIENCY: ICD-10-CM

## 2025-07-28 LAB — IGA SERPL-MCNC: 136 MG/DL (ref 40–350)

## 2025-07-28 PROCEDURE — 36415 COLL VENOUS BLD VENIPUNCTURE: CPT

## 2025-07-28 PROCEDURE — 82784 ASSAY IGA/IGD/IGG/IGM EACH: CPT

## 2025-07-28 PROCEDURE — 86364 TISS TRNSGLTMNASE EA IG CLAS: CPT

## 2025-07-31 LAB — W TISSUE TRANSGLUTAMINASE IGA AB: 0.6 U/ML

## 2025-08-11 ENCOUNTER — OFFICE VISIT (OUTPATIENT)
Dept: PRIMARY CARE CLINIC | Facility: CLINIC | Age: 30
End: 2025-08-11
Payer: COMMERCIAL

## 2025-08-11 DIAGNOSIS — R11.0 NAUSEA: ICD-10-CM

## 2025-08-11 DIAGNOSIS — R22.0 FACIAL SWELLING: ICD-10-CM

## 2025-08-11 DIAGNOSIS — R42 DIZZINESS: Primary | ICD-10-CM

## 2025-08-11 DIAGNOSIS — E61.1 IRON DEFICIENCY: ICD-10-CM

## 2025-08-11 DIAGNOSIS — C08.9 MUCOEPIDERMOID CARCINOMA OF SALIVARY GLAND: ICD-10-CM

## 2025-08-11 DIAGNOSIS — C77.0 SECONDARY AND UNSPECIFIED MALIGNANT NEOPLASM OF LYMPH NODES OF HEAD, FACE AND NECK: ICD-10-CM

## 2025-08-11 PROCEDURE — 1160F RVW MEDS BY RX/DR IN RCRD: CPT | Mod: CPTII,95,, | Performed by: INTERNAL MEDICINE

## 2025-08-11 PROCEDURE — 98006 SYNCH AUDIO-VIDEO EST MOD 30: CPT | Mod: 95,,, | Performed by: INTERNAL MEDICINE

## 2025-08-11 PROCEDURE — 1159F MED LIST DOCD IN RCRD: CPT | Mod: CPTII,95,, | Performed by: INTERNAL MEDICINE

## 2025-08-11 RX ORDER — ONDANSETRON 4 MG/1
4 TABLET, ORALLY DISINTEGRATING ORAL EVERY 8 HOURS PRN
Qty: 30 TABLET | Refills: 0 | Status: SHIPPED | OUTPATIENT
Start: 2025-08-11

## 2025-08-19 ENCOUNTER — HOSPITAL ENCOUNTER (OUTPATIENT)
Dept: RADIOLOGY | Facility: HOSPITAL | Age: 30
Discharge: HOME | End: 2025-08-19
Attending: INTERNAL MEDICINE
Payer: COMMERCIAL

## 2025-08-19 DIAGNOSIS — R42 DIZZINESS: ICD-10-CM

## 2025-08-19 PROCEDURE — 70470 CT HEAD/BRAIN W/O & W/DYE: CPT | Mod: 26,,, | Performed by: RADIOLOGY

## 2025-08-19 PROCEDURE — 25500020 PHARM REV CODE 255: Performed by: INTERNAL MEDICINE

## 2025-08-19 PROCEDURE — 70470 CT HEAD/BRAIN W/O & W/DYE: CPT | Mod: TC

## 2025-08-19 RX ADMIN — IOHEXOL 75 ML: 350 INJECTION, SOLUTION INTRAVENOUS at 02:08

## 2025-08-20 ENCOUNTER — TELEPHONE (OUTPATIENT)
Dept: PRIMARY CARE CLINIC | Facility: CLINIC | Age: 30
End: 2025-08-20
Payer: COMMERCIAL

## 2025-08-22 ENCOUNTER — TELEPHONE (OUTPATIENT)
Dept: PRIMARY CARE CLINIC | Facility: CLINIC | Age: 30
End: 2025-08-22
Payer: COMMERCIAL

## 2025-08-25 ENCOUNTER — PATIENT MESSAGE (OUTPATIENT)
Dept: PRIMARY CARE CLINIC | Facility: CLINIC | Age: 30
End: 2025-08-25
Payer: COMMERCIAL

## 2025-08-27 ENCOUNTER — LAB VISIT (OUTPATIENT)
Dept: LAB | Facility: HOSPITAL | Age: 30
End: 2025-08-27
Attending: PSYCHIATRY & NEUROLOGY
Payer: COMMERCIAL

## 2025-08-27 ENCOUNTER — OFFICE VISIT (OUTPATIENT)
Dept: NEUROLOGY | Facility: CLINIC | Age: 30
End: 2025-08-27
Payer: COMMERCIAL

## 2025-08-27 VITALS
SYSTOLIC BLOOD PRESSURE: 119 MMHG | BODY MASS INDEX: 34.16 KG/M2 | HEART RATE: 100 BPM | WEIGHT: 185.63 LBS | HEIGHT: 62 IN | DIASTOLIC BLOOD PRESSURE: 75 MMHG

## 2025-08-27 DIAGNOSIS — G44.229 CHRONIC TENSION-TYPE HEADACHE, NOT INTRACTABLE: ICD-10-CM

## 2025-08-27 DIAGNOSIS — E23.6 EMPTY SELLA SYNDROME: Primary | ICD-10-CM

## 2025-08-27 DIAGNOSIS — E23.6 EMPTY SELLA SYNDROME: ICD-10-CM

## 2025-08-27 DIAGNOSIS — G43.009 MIGRAINE WITHOUT AURA AND WITHOUT STATUS MIGRAINOSUS, NOT INTRACTABLE: ICD-10-CM

## 2025-08-27 PROCEDURE — 3078F DIAST BP <80 MM HG: CPT | Mod: CPTII,S$GLB,, | Performed by: PSYCHIATRY & NEUROLOGY

## 2025-08-27 PROCEDURE — 84146 ASSAY OF PROLACTIN: CPT

## 2025-08-27 PROCEDURE — 99999 PR PBB SHADOW E&M-EST. PATIENT-LVL IV: CPT | Mod: PBBFAC,,, | Performed by: PSYCHIATRY & NEUROLOGY

## 2025-08-27 PROCEDURE — 1159F MED LIST DOCD IN RCRD: CPT | Mod: CPTII,S$GLB,, | Performed by: PSYCHIATRY & NEUROLOGY

## 2025-08-27 PROCEDURE — 36415 COLL VENOUS BLD VENIPUNCTURE: CPT

## 2025-08-27 PROCEDURE — 3074F SYST BP LT 130 MM HG: CPT | Mod: CPTII,S$GLB,, | Performed by: PSYCHIATRY & NEUROLOGY

## 2025-08-27 PROCEDURE — 84305 ASSAY OF SOMATOMEDIN: CPT

## 2025-08-27 PROCEDURE — 83001 ASSAY OF GONADOTROPIN (FSH): CPT

## 2025-08-27 PROCEDURE — 83002 ASSAY OF GONADOTROPIN (LH): CPT

## 2025-08-27 PROCEDURE — 3008F BODY MASS INDEX DOCD: CPT | Mod: CPTII,S$GLB,, | Performed by: PSYCHIATRY & NEUROLOGY

## 2025-08-27 PROCEDURE — 99204 OFFICE O/P NEW MOD 45 MIN: CPT | Mod: S$GLB,,, | Performed by: PSYCHIATRY & NEUROLOGY

## 2025-08-27 PROCEDURE — 1160F RVW MEDS BY RX/DR IN RCRD: CPT | Mod: CPTII,S$GLB,, | Performed by: PSYCHIATRY & NEUROLOGY

## 2025-08-27 RX ORDER — TOPIRAMATE 25 MG/1
25 TABLET, FILM COATED ORAL 2 TIMES DAILY
Qty: 60 TABLET | Refills: 3 | Status: SHIPPED | OUTPATIENT
Start: 2025-08-27 | End: 2025-12-25

## 2025-08-28 LAB
FSH SERPL-ACNC: 11.19 MIU/ML
LH SERPL-ACNC: 61 MIU/ML
PROLACTIN SERPL IA-MCNC: 18.4 NG/ML (ref 5.2–26.5)

## 2025-08-29 ENCOUNTER — PATIENT MESSAGE (OUTPATIENT)
Dept: NEUROLOGY | Facility: CLINIC | Age: 30
End: 2025-08-29
Payer: COMMERCIAL

## 2025-09-02 LAB — MAYO GENERIC ORDERABLE RESULT: NORMAL

## 2025-09-03 DIAGNOSIS — G44.229 CHRONIC TENSION-TYPE HEADACHE, NOT INTRACTABLE: ICD-10-CM

## 2025-09-03 DIAGNOSIS — G43.009 MIGRAINE WITHOUT AURA AND WITHOUT STATUS MIGRAINOSUS, NOT INTRACTABLE: Primary | ICD-10-CM

## 2025-09-03 RX ORDER — TOPIRAMATE 50 MG/1
50 TABLET, FILM COATED ORAL 2 TIMES DAILY
Qty: 60 TABLET | Refills: 3 | Status: SHIPPED | OUTPATIENT
Start: 2025-09-03 | End: 2026-01-01

## (undated) DEVICE — SHEET EENT SPLIT

## (undated) DEVICE — Device

## (undated) DEVICE — SEE MEDLINE ITEM 157194

## (undated) DEVICE — SEE MEDLINE ITEM 157128

## (undated) DEVICE — SUT 3-0 12-18IN SILK

## (undated) DEVICE — SUT SILK 2-0 PS 18IN BLACK

## (undated) DEVICE — ELECTRODE BLADE INSULATED 1 IN

## (undated) DEVICE — EVACUATOR WOUND BULB 100CC

## (undated) DEVICE — SUT COATED VICRYL 4/0 27IN

## (undated) DEVICE — TRAY MINOR GEN SURG

## (undated) DEVICE — GOWN SURGICAL X-LARGE

## (undated) DEVICE — DRAPE STERI INSTRUMENT 1018

## (undated) DEVICE — SUT 2-0 12-18IN SILK

## (undated) DEVICE — NDL HYPO REG 25G X 1 1/2

## (undated) DEVICE — SUT VICRYL PLUS 3-0 SH 18IN

## (undated) DEVICE — BLADE SURG #15 CARBON STEEL

## (undated) DEVICE — PROBE SIMULATOR KRAFF

## (undated) DEVICE — SUT VICRYL PLUS 4-0 P3 18IN

## (undated) DEVICE — DRAIN BLAKE HUBLS 10F RD

## (undated) DEVICE — CLIP MED TICALL

## (undated) DEVICE — DISSECTOR LIGASURE EXACT 21CM

## (undated) DEVICE — ADHESIVE DERMABOND ADVANCED

## (undated) DEVICE — TOWEL OR DISP STRL BLUE 4/PK

## (undated) DEVICE — ELECTRODE NDL

## (undated) DEVICE — STAPLER SKIN PROXIMATE WIDE

## (undated) DEVICE — COVER LIGHT HANDLE 80/CA

## (undated) DEVICE — HOOK LONE STAR BLUNT 12MM

## (undated) DEVICE — HEMOSTAT SURGICEL FIBRLR 1X2IN

## (undated) DEVICE — GAUZE SPONGE PEANUT STRL

## (undated) DEVICE — CORD BIPOLAR 12 FOOT

## (undated) DEVICE — SUT ETHILON 3-0 PS2 18 BLK

## (undated) DEVICE — BLADE SURGICAL GLASSVAN #12

## (undated) DEVICE — CONTAINER SPECIMEN STRL 4OZ

## (undated) DEVICE — SKINMARKER & RULER REGULAR X-F

## (undated) DEVICE — SUT LIGACLIP SMALL XTRA

## (undated) DEVICE — SEE MEDLINE ITEM 154981

## (undated) DEVICE — SUT 3/0 18IN PDS II CLR MON

## (undated) DEVICE — ELECTRODE REM PLYHSV RETURN 9

## (undated) DEVICE — PAD CUSTOM COTTON 2 X 2

## (undated) DEVICE — SPONGE LAP 18X18 PREWASHED

## (undated) DEVICE — FIBRILLAR ABS HEMOSTAT 4X4

## (undated) DEVICE — SUT PROLENE 6-0 C-1 30IN BL